# Patient Record
Sex: MALE | Race: WHITE | NOT HISPANIC OR LATINO | Employment: FULL TIME | URBAN - METROPOLITAN AREA
[De-identification: names, ages, dates, MRNs, and addresses within clinical notes are randomized per-mention and may not be internally consistent; named-entity substitution may affect disease eponyms.]

---

## 2017-03-07 DIAGNOSIS — I10 BENIGN ESSENTIAL HYPERTENSION: ICD-10-CM

## 2017-03-07 NOTE — TELEPHONE ENCOUNTER
lisinopril-hydrochlorothiazide (PRINZIDE/ZESTORETIC) 10-12.5 MG per tablet   Last Written Prescription Date: 2-  Last Fill Quantity: 30, # refills: 0  Last Office Visit with [facility], [facility] or [facility] prescribing provider: 10-  Pt. Requested a 90 day supply. Please send RX       Potassium   Date Value Ref Range Status   03/03/2016 4.1 3.4 - 5.3 mmol/L Final     Creatinine   Date Value Ref Range Status   03/03/2016 1.03 0.66 - 1.25 mg/dL Final     BP Readings from Last 3 Encounters:   10/28/16 120/74   03/02/16 120/74   08/03/15 122/84

## 2017-03-08 RX ORDER — LISINOPRIL/HYDROCHLOROTHIAZIDE 10-12.5 MG
1 TABLET ORAL DAILY
Qty: 90 TABLET | Refills: 0 | Status: CANCELLED | OUTPATIENT
Start: 2017-03-08

## 2017-03-08 NOTE — TELEPHONE ENCOUNTER
Need OV with Dr. Eason for HTN f/u and lab.  Last OV for HTN on March 2016  Please call patient and assist with appt.  Thank you     Candelaria Gipson RN

## 2017-03-12 DIAGNOSIS — I10 BENIGN ESSENTIAL HYPERTENSION: ICD-10-CM

## 2017-03-13 RX ORDER — LISINOPRIL/HYDROCHLOROTHIAZIDE 10-12.5 MG
TABLET ORAL
Qty: 90 TABLET | Refills: 0 | OUTPATIENT
Start: 2017-03-13

## 2017-03-13 NOTE — TELEPHONE ENCOUNTER
Prinzide      Last Written Prescription Date: 2/24/17  Last Fill Quantity: 30, # refills: 0  Last Office Visit with G, P or [facility] prescribing provider: 12/3/16  Next 5 appointments (look out 90 days)     Mar 24, 2017  9:20 AM CDT   Office Visit with Chris Eason MD   [facility]    [address] [place] [id number] [phone]                   Potassium   Date Value Ref Range Status   03/03/2016 4.1 3.4 - 5.3 mmol/L Final     Creatinine   Date Value Ref Range Status   03/03/2016 1.03 0.66 - 1.25 mg/dL Final     BP Readings from Last 3 Encounters:   10/28/16 120/74   03/02/16 120/74   08/03/15 122/84     Homa Romero CMA

## 2017-03-24 ENCOUNTER — OFFICE VISIT (OUTPATIENT)
Dept: FAMILY MEDICINE | Facility: CLINIC | Age: 52
End: 2017-03-24
Payer: COMMERCIAL

## 2017-03-24 VITALS
SYSTOLIC BLOOD PRESSURE: 120 MMHG | OXYGEN SATURATION: 100 % | BODY MASS INDEX: 36.57 KG/M2 | WEIGHT: 270 LBS | HEART RATE: 67 BPM | DIASTOLIC BLOOD PRESSURE: 82 MMHG | TEMPERATURE: 97.2 F | HEIGHT: 72 IN

## 2017-03-24 DIAGNOSIS — Z12.5 SCREENING FOR PROSTATE CANCER: ICD-10-CM

## 2017-03-24 DIAGNOSIS — I10 BENIGN ESSENTIAL HYPERTENSION: Primary | ICD-10-CM

## 2017-03-24 LAB
ALBUMIN SERPL-MCNC: 3.8 G/DL (ref 3.4–5)
ALP SERPL-CCNC: 88 U/L (ref 40–150)
ALT SERPL W P-5'-P-CCNC: 34 U/L (ref 0–70)
ANION GAP SERPL CALCULATED.3IONS-SCNC: 7 MMOL/L (ref 3–14)
AST SERPL W P-5'-P-CCNC: 15 U/L (ref 0–45)
BILIRUB SERPL-MCNC: 0.6 MG/DL (ref 0.2–1.3)
BUN SERPL-MCNC: 17 MG/DL (ref 7–30)
CALCIUM SERPL-MCNC: 9.4 MG/DL (ref 8.5–10.1)
CHLORIDE SERPL-SCNC: 104 MMOL/L (ref 94–109)
CHOLEST SERPL-MCNC: 240 MG/DL
CO2 SERPL-SCNC: 28 MMOL/L (ref 20–32)
CREAT SERPL-MCNC: 1.06 MG/DL (ref 0.66–1.25)
GFR SERPL CREATININE-BSD FRML MDRD: 74 ML/MIN/1.7M2
GLUCOSE SERPL-MCNC: 89 MG/DL (ref 70–99)
HDLC SERPL-MCNC: 43 MG/DL
HGB BLD-MCNC: 15 G/DL (ref 13.3–17.7)
LDLC SERPL CALC-MCNC: 171 MG/DL
NONHDLC SERPL-MCNC: 197 MG/DL
POTASSIUM SERPL-SCNC: 4 MMOL/L (ref 3.4–5.3)
PROT SERPL-MCNC: 7.3 G/DL (ref 6.8–8.8)
PSA SERPL-ACNC: 0.27 UG/L (ref 0–4)
SODIUM SERPL-SCNC: 139 MMOL/L (ref 133–144)
TRIGL SERPL-MCNC: 130 MG/DL

## 2017-03-24 PROCEDURE — 36415 COLL VENOUS BLD VENIPUNCTURE: CPT | Performed by: FAMILY MEDICINE

## 2017-03-24 PROCEDURE — 80053 COMPREHEN METABOLIC PANEL: CPT | Performed by: FAMILY MEDICINE

## 2017-03-24 PROCEDURE — 80061 LIPID PANEL: CPT | Performed by: FAMILY MEDICINE

## 2017-03-24 PROCEDURE — 85018 HEMOGLOBIN: CPT | Performed by: FAMILY MEDICINE

## 2017-03-24 PROCEDURE — G0103 PSA SCREENING: HCPCS | Performed by: FAMILY MEDICINE

## 2017-03-24 PROCEDURE — 99213 OFFICE O/P EST LOW 20 MIN: CPT | Performed by: FAMILY MEDICINE

## 2017-03-24 RX ORDER — LISINOPRIL/HYDROCHLOROTHIAZIDE 10-12.5 MG
1 TABLET ORAL DAILY
Qty: 90 TABLET | Refills: 3 | Status: SHIPPED | OUTPATIENT
Start: 2017-03-24 | End: 2018-03-25

## 2017-03-24 NOTE — NURSING NOTE
Chief Complaint   Patient presents with     Hypertension     is fasting       Initial /82  Pulse 67  Temp 97.2  F (36.2  C) (Tympanic)  Ht 6' (1.829 m)  Wt 270 lb (122.5 kg)  SpO2 100%  BMI 36.62 kg/m2 Estimated body mass index is 36.62 kg/(m^2) as calculated from the following:    Height as of this encounter: 6' (1.829 m).    Weight as of this encounter: 270 lb (122.5 kg).  Medication Reconciliation: ivonne Romero CMA

## 2017-03-24 NOTE — PROGRESS NOTES
SUBJECTIVE:                                                    Amilcar Yang is a 51 year old male who presents to clinic today for the following health issues:      Hypertension Follow-up      Outpatient blood pressures are not being checked.    Low Salt Diet: no added salt       Amount of exercise or physical activity: None    Problems taking medications regularly: No    Medication side effects: none    Diet: regular (no restrictions)        Problem list and histories reviewed & adjusted, as indicated.  Additional history: as documented    Patient Active Problem List   Diagnosis     Heartburn     Obesity     Hyperlipidemia LDL goal <160     Benign essential hypertension     Past Surgical History:   Procedure Laterality Date     C NONSPECIFIC PROCEDURE      hernia surgery as a baby     NO HISTORY OF SURGERY         Social History   Substance Use Topics     Smoking status: Former Smoker     Quit date: 7/17/1985     Smokeless tobacco: Never Used     Alcohol use 0.0 oz/week     0 Standard drinks or equivalent per week      Comment: 1-2 drinks per month     Family History   Problem Relation Age of Onset     Hypertension Father      DIABETES Mother      C.A.D. Mother      CEREBROVASCULAR DISEASE Mother      Lipids Mother      Cancer - colorectal Paternal Grandfather      also 8 paternal aunts and uncles with colon CA         Current Outpatient Prescriptions   Medication Sig Dispense Refill     lisinopril-hydrochlorothiazide (PRINZIDE/ZESTORETIC) 10-12.5 MG per tablet Take 1 tablet by mouth daily 90 tablet 3     [DISCONTINUED] lisinopril-hydrochlorothiazide (PRINZIDE/ZESTORETIC) 10-12.5 MG per tablet TAKE 1 TABLET BY MOUTH DAILY 30 tablet 0     No Known Allergies  Recent Labs   Lab Test 03/29/16 03/03/16   0721  08/03/15   0756  07/18/15   0517  07/17/15   0926  12/11/13   0735   A1C   --    --    --    --   5.8  5.6   LDL   --   158*  126  163*   --   159*   HDL   --   43  36*  40*   --   42   TRIG   --   174*   279*  118   --   125   ALT   --   39   --    --   40  41   CR   --   1.03  1.05   --   0.98  1.04   GFRESTIMATED   --   77  75   --   81  77   GFRESTBLACK   --   >90   GFR Calc    >90   GFR Calc     --   >90   GFR Calc    >90   POTASSIUM   --   4.1  3.9   --   3.9  4.6   TSH  1.62   --    --    --    --   1.28      BP Readings from Last 3 Encounters:   03/24/17 120/82   10/28/16 120/74   03/02/16 120/74    Wt Readings from Last 3 Encounters:   03/24/17 270 lb (122.5 kg)   10/28/16 265 lb (120.2 kg)   03/02/16 268 lb (121.6 kg)                    Reviewed and updated as needed this visit by clinical staff  Tobacco  Allergies  Meds  Med Hx  Surg Hx  Fam Hx  Soc Hx      Reviewed and updated as needed this visit by Provider         ROS:  Constitutional, HEENT, cardiovascular, pulmonary, gi and gu systems are negative, except as otherwise noted.    OBJECTIVE:                                                    /82  Pulse 67  Temp 97.2  F (36.2  C) (Tympanic)  Ht 6' (1.829 m)  Wt 270 lb (122.5 kg)  SpO2 100%  BMI 36.62 kg/m2  Body mass index is 36.62 kg/(m^2).  GENERAL: healthy, alert and no distress  NECK: no adenopathy, no asymmetry, masses, or scars and thyroid normal to palpation  RESP: lungs clear to auscultation - no rales, rhonchi or wheezes  CV: regular rate and rhythm, normal S1 S2, no S3 or S4, no murmur, click or rub, no peripheral edema and peripheral pulses strong  ABDOMEN: soft, nontender, no hepatosplenomegaly, no masses and bowel sounds normal  MS: no gross musculoskeletal defects noted, no edema         ASSESSMENT/PLAN:                                                    Amilcar was seen today for hypertension.    Diagnoses and all orders for this visit:    Benign essential hypertension  -     lisinopril-hydrochlorothiazide (PRINZIDE/ZESTORETIC) 10-12.5 MG per tablet; Take 1 tablet by mouth daily  -     Comprehensive metabolic panel (BMP + Alb,  Alk Phos, ALT, AST, Total. Bili, TP)  -     Lipid Profile with reflex to direct LDL  -     Hemoglobin  -     PSA, screen      FUTURE APPOINTMENTS:       - Follow-up visit in 1 year     Chris Eason MD  [facility]

## 2017-03-24 NOTE — MR AVS SNAPSHOT
After Visit Summary   3/24/2017    Amilcar Yang    MRN: [id number]           Patient Information     Date Of Birth          1965        Visit Information        Provider Department      3/24/2017 9:20 AM Chris Eason MD [facility] [place]        Today's Diagnoses     Benign essential hypertension    -  1    Screening for prostate cancer           Follow-ups after your visit        Who to contact     If you have questions or need follow up information about today's clinic visit or your schedule please contact [facility] [place] directly at [phone].  Normal or non-critical lab and imaging results will be communicated to you by Manga Cortahart, letter or phone within 4 business days after the clinic has received the results. If you do not hear from us within 7 days, please contact the clinic through [employer] or phone. If you have a critical or abnormal lab result, we will notify you by phone as soon as possible.  Submit refill requests through [employer] or call your pharmacy and they will forward the refill request to us. Please allow 3 business days for your refill to be completed.          Additional Information About Your Visit        MyChart Information     [employer] gives you secure access to your electronic health record. If you see a primary care provider, you can also send messages to your care team and make appointments. If you have questions, please call your primary care clinic.  If you do not have a primary care provider, please call [phone] and they will assist you.        Care EveryWhere ID     This is your Care EveryWhere ID. This could be used by other organizations to access your [facility] medical records  [id number]        Your Vitals Were     Pulse Temperature Height Pulse Oximetry BMI (Body Mass Index)       67 97.2  F (36.2  C) (Tympanic) 6' (1.829 m) 100% 36.62 kg/m2        Blood Pressure from Last 3 Encounters:   03/24/17 120/82   10/28/16 120/74    03/02/16 120/74    Weight from Last 3 Encounters:   03/24/17 270 lb (122.5 kg)   10/28/16 265 lb (120.2 kg)   03/02/16 268 lb (121.6 kg)              We Performed the Following     Comprehensive metabolic panel (BMP + Alb, Alk Phos, ALT, AST, Total. Bili, TP)     Hemoglobin     Lipid Profile with reflex to direct LDL     PSA, screen          Today's Medication Changes          These changes are accurate as of: 3/24/17  9:34 AM.  If you have any questions, ask your nurse or doctor.               These medicines have changed or have updated prescriptions.        Dose/Directions    lisinopril-hydrochlorothiazide 10-12.5 MG per tablet   Commonly known as:  PRINZIDE/ZESTORETIC   This may have changed:  See the new instructions.   Used for:  Benign essential hypertension   Changed by:  Chris Eason MD        Dose:  1 tablet   Take 1 tablet by mouth daily   Quantity:  90 tablet   Refills:  3            Where to get your medicines      These medications were sent to [facility]/pharmacy #[id number] [place], [place] - [address] [address]     Phone:  [phone]     lisinopril-hydrochlorothiazide 10-12.5 MG per tablet                Primary Care Provider Office Phone #    [facility] [phone]       No address on file        Thank you!     Thank you for choosing [facility] [place]  for your care. Our goal is always to provide you with excellent care. Hearing back from our patients is one way we can continue to improve our services. Please take a few minutes to complete the written survey that you may receive in the mail after your visit with us. Thank you!             Your Updated Medication List - Protect others around you: Learn how to safely use, store and throw away your medicines at www.disposemymeds.org.          This list is accurate as of: 3/24/17  9:34 AM.  Always use your most recent med list.                   Brand Name Dispense Instructions for use     lisinopril-hydrochlorothiazide 10-12.5 MG per tablet    PRINZIDE/ZESTORETIC    90 tablet    Take 1 tablet by mouth daily

## 2017-05-30 ENCOUNTER — OFFICE VISIT (OUTPATIENT)
Dept: FAMILY MEDICINE | Facility: CLINIC | Age: 52
End: 2017-05-30
Payer: COMMERCIAL

## 2017-05-30 VITALS
BODY MASS INDEX: 35.26 KG/M2 | DIASTOLIC BLOOD PRESSURE: 70 MMHG | TEMPERATURE: 98.4 F | SYSTOLIC BLOOD PRESSURE: 124 MMHG | HEART RATE: 88 BPM | WEIGHT: 260 LBS

## 2017-05-30 DIAGNOSIS — K64.4 EXTERNAL HEMORRHOIDS: Primary | ICD-10-CM

## 2017-05-30 PROCEDURE — 99213 OFFICE O/P EST LOW 20 MIN: CPT | Performed by: FAMILY MEDICINE

## 2017-05-30 NOTE — NURSING NOTE
Chief Complaint   Patient presents with     Rectal Problem       Initial /70  Pulse 88  Temp 98.4  F (36.9  C) (Tympanic)  Wt 260 lb (117.9 kg)  BMI 35.26 kg/m2 Estimated body mass index is 35.26 kg/(m^2) as calculated from the following:    Height as of 3/24/17: 6' (1.829 m).    Weight as of this encounter: 260 lb (117.9 kg).  Medication Reconciliation: complete    Current Outpatient Prescriptions   Medication Sig Dispense Refill     lisinopril-hydrochlorothiazide (PRINZIDE/ZESTORETIC) 10-12.5 MG per tablet Take 1 tablet by mouth daily 90 tablet 3       David M, CMA

## 2017-05-30 NOTE — MR AVS SNAPSHOT
After Visit Summary   5/30/2017    Amilcar Yang    MRN: [id number]           Patient Information     Date Of Birth          1965        Visit Information        Provider Department      5/30/2017 10:40 AM [facility]        Today's Diagnoses     External hemorrhoids    -  1       Follow-ups after your visit        Additional Services     COLORECTAL SURGERY REFERRAL       Your provider has referred you to: [facility]: [facility] Liz Zimmerman [phone]  [email or web address]    Referral Reason(s): Hemorrhoids  Special Concerns: None  This referral is: Urgent (24 - 72 hours)  It is OK to leave a message on patient's voicemail.    Please be aware that coverage of these services is subject to the terms and limitations of your health insurance plan.  Call member services at your health plan with any benefit or coverage questions.      Please bring the following with you to your appointment:    (1) Any X-Rays, CTs or MRIs which have been performed.  Contact the facility where they were done to arrange for  prior to your scheduled appointment.    (2) List of current medications  (3) This referral request   (4) Any documents/labs given to you for this referral                  Who to contact     If you have questions or need follow up information about today's clinic visit or your schedule please contact [facility] [place] directly at [phone].  Normal or non-critical lab and imaging results will be communicated to you by MyChart, letter or phone within 4 business days after the clinic has received the results. If you do not hear from us within 7 days, please contact the clinic through MyChart or phone. If you have a critical or abnormal lab result, we will notify you by phone as soon as possible.  Submit refill requests through [employer] or call your pharmacy and they will forward the refill request  to us. Please allow 3 business days for your refill to be completed.          Additional Information About Your Visit        ASYM IIIhart Information     [facility] gives you secure access to your electronic health record. If you see a primary care provider, you can also send messages to your care team and make appointments. If you have questions, please call your primary care clinic.  If you do not have a primary care provider, please call [phone] and they will assist you.        Care EveryWhere ID     This is your Care EveryWhere ID. This could be used by other organizations to access your [facility] medical records  [id number]        Your Vitals Were     Pulse Temperature BMI (Body Mass Index)             88 98.4  F (36.9  C) (Tympanic) 35.26 kg/m2          Blood Pressure from Last 3 Encounters:   05/30/17 124/70   03/24/17 120/82   10/28/16 120/74    Weight from Last 3 Encounters:   05/30/17 260 lb (117.9 kg)   03/24/17 270 lb (122.5 kg)   10/28/16 265 lb (120.2 kg)              We Performed the Following     COLORECTAL SURGERY REFERRAL          Today's Medication Changes          These changes are accurate as of: 5/30/17 10:57 AM.  If you have any questions, ask your nurse or doctor.               Start taking these medicines.        Dose/Directions    hydrocortisone 2.5 % cream   Commonly known as:  ANUSOL-HC   Used for:  External hemorrhoids   Started by:  Vaibhav Seymour MD        Place rectally 2 times daily   Quantity:  30 g   Refills:  0            Where to get your medicines      These medications were sent to [facility]/pharmacy #[id number] [place] [id number] [address] [address]     Phone:  [phone]     hydrocortisone 2.5 % cream                [facility] Care Provider Office Phone [facility] [phone]       No address on file        Thank you!     Thank you for choosing [facility] [place]  for your care. Our goal is always to provide you with  excellent care. Hearing back from our patients is one way we can continue to improve our services. Please take a few minutes to complete the written survey that you may receive in the mail after your visit with us. Thank you!             Your Updated Medication List - Protect others around you: Learn how to safely use, store and throw away your medicines at www.disposemymeds.org.          This list is accurate as of: 5/30/17 10:57 AM.  Always use your most recent med list.                   Brand Name Dispense Instructions for use    hydrocortisone 2.5 % cream    ANUSOL-HC    30 g    Place rectally 2 times daily       lisinopril-hydrochlorothiazide 10-12.5 MG per tablet    PRINZIDE/ZESTORETIC    90 tablet    Take 1 tablet by mouth daily

## 2017-05-30 NOTE — PROGRESS NOTES
SUBJECTIVE:                                                    Amilcar Yang is a 51 year old male who presents to clinic today for the following health issues:      Hemorrhoids     Onset: 3 days ago    Noticed after increase constipation, which has revolved now he feels on and off pain    Description:   Pain: YES  Itching: no     Accompanying Signs & Symptoms:  Blood streaked toilet paper: no   Blood in stool: no   Changes in stool pattern: no    History:   Any previous GI studies done:Colonoscopy 2016  Family History of colon cancer: no        Therapies Tried and outcome: increased fluids          Problem list and histories reviewed & adjusted, as indicated.  Additional history: as documented    Patient Active Problem List   Diagnosis     Heartburn     Obesity     Hyperlipidemia LDL goal <160     Benign essential hypertension     Past Surgical History:   Procedure Laterality Date     C NONSPECIFIC PROCEDURE      hernia surgery as a baby     NO HISTORY OF SURGERY         Social History   Substance Use Topics     Smoking status: Former Smoker     Quit date: 7/17/1985     Smokeless tobacco: Never Used     Alcohol use 0.0 oz/week     0 Standard drinks or equivalent per week      Comment: 1-2 drinks per month     Family History   Problem Relation Age of Onset     Hypertension Father      DIABETES Mother      C.A.D. Mother      CEREBROVASCULAR DISEASE Mother      Lipids Mother      Cancer - colorectal Paternal Grandfather      also 8 paternal aunts and uncles with colon CA         Current Outpatient Prescriptions   Medication Sig Dispense Refill     hydrocortisone (ANUSOL-HC) 2.5 % cream Place rectally 2 times daily 30 g 0     lisinopril-hydrochlorothiazide (PRINZIDE/ZESTORETIC) 10-12.5 MG per tablet Take 1 tablet by mouth daily 90 tablet 3       Reviewed and updated as needed this visit by clinical staff  Tobacco  Allergies       Reviewed and updated as needed this visit by Provider         ROS:  C: NEGATIVE for  fever, chills, change in weight  GI: rectal pain    OBJECTIVE:                                                    /70  Pulse 88  Temp 98.4  F (36.9  C) (Tympanic)  Wt 260 lb (117.9 kg)  BMI 35.26 kg/m2  Body mass index is 35.26 kg/(m^2).   GENERAL: healthy, alert, well nourished, well hydrated, no distress  Large external hemmoroid not thrombosed at 9 clock position almost 1 x1 cm in diameter.       ASSESSMENT/PLAN:                                                        ICD-10-CM    1. External hemorrhoids K64.4 hydrocortisone (ANUSOL-HC) 2.5 % cream     COLORECTAL SURGERY REFERRAL       Patient has large external hemmoroid, not thromboid, given the size I suggested follow up with colon specialist for evaluation. Meanwhile use the cream and also increase fiber.  Follow up if it is getting worse.    Vaibhav Seymour MD  [facility] [place]

## 2017-05-31 ENCOUNTER — TRANSFERRED RECORDS (OUTPATIENT)
Dept: HEALTH INFORMATION MANAGEMENT | Facility: CLINIC | Age: 52
End: 2017-05-31

## 2017-09-10 NOTE — TELEPHONE ENCOUNTER
Patient has supply for coverage until next scheduled appt on 3/24/17.    Will defer to Dr. Eason to discuss and refill at OV.      Candelaria Gipson RN         Color consistent with ethnicity/race, warm, dry intact, resilient.

## 2018-03-30 ENCOUNTER — OFFICE VISIT (OUTPATIENT)
Dept: FAMILY MEDICINE | Facility: CLINIC | Age: 53
End: 2018-03-30
Payer: COMMERCIAL

## 2018-03-30 VITALS
HEART RATE: 65 BPM | RESPIRATION RATE: 16 BRPM | BODY MASS INDEX: 38.33 KG/M2 | WEIGHT: 273.8 LBS | OXYGEN SATURATION: 100 % | TEMPERATURE: 96.3 F | DIASTOLIC BLOOD PRESSURE: 77 MMHG | SYSTOLIC BLOOD PRESSURE: 119 MMHG | HEIGHT: 71 IN

## 2018-03-30 DIAGNOSIS — M25.561 CHRONIC PAIN OF BOTH KNEES: ICD-10-CM

## 2018-03-30 DIAGNOSIS — G89.29 CHRONIC PAIN OF BOTH KNEES: ICD-10-CM

## 2018-03-30 DIAGNOSIS — M25.562 CHRONIC PAIN OF BOTH KNEES: ICD-10-CM

## 2018-03-30 DIAGNOSIS — E78.5 HYPERLIPIDEMIA LDL GOAL <160: Primary | ICD-10-CM

## 2018-03-30 DIAGNOSIS — I10 BENIGN ESSENTIAL HYPERTENSION: ICD-10-CM

## 2018-03-30 LAB
ANION GAP SERPL CALCULATED.3IONS-SCNC: 7 MMOL/L (ref 3–14)
BUN SERPL-MCNC: 13 MG/DL (ref 7–30)
CALCIUM SERPL-MCNC: 9.4 MG/DL (ref 8.5–10.1)
CHLORIDE SERPL-SCNC: 104 MMOL/L (ref 94–109)
CHOLEST SERPL-MCNC: 222 MG/DL
CO2 SERPL-SCNC: 27 MMOL/L (ref 20–32)
CREAT SERPL-MCNC: 1.11 MG/DL (ref 0.66–1.25)
GFR SERPL CREATININE-BSD FRML MDRD: 70 ML/MIN/1.7M2
GLUCOSE SERPL-MCNC: 88 MG/DL (ref 70–99)
HDLC SERPL-MCNC: 38 MG/DL
LDLC SERPL CALC-MCNC: 166 MG/DL
NONHDLC SERPL-MCNC: 184 MG/DL
POTASSIUM SERPL-SCNC: 3.7 MMOL/L (ref 3.4–5.3)
SODIUM SERPL-SCNC: 138 MMOL/L (ref 133–144)
TRIGL SERPL-MCNC: 92 MG/DL

## 2018-03-30 PROCEDURE — 80061 LIPID PANEL: CPT | Performed by: PHYSICIAN ASSISTANT

## 2018-03-30 PROCEDURE — 99213 OFFICE O/P EST LOW 20 MIN: CPT | Performed by: PHYSICIAN ASSISTANT

## 2018-03-30 PROCEDURE — 36415 COLL VENOUS BLD VENIPUNCTURE: CPT | Performed by: PHYSICIAN ASSISTANT

## 2018-03-30 PROCEDURE — 80048 BASIC METABOLIC PNL TOTAL CA: CPT | Performed by: PHYSICIAN ASSISTANT

## 2018-03-30 RX ORDER — LISINOPRIL/HYDROCHLOROTHIAZIDE 10-12.5 MG
1 TABLET ORAL DAILY
Qty: 90 TABLET | Refills: 3 | Status: SHIPPED | OUTPATIENT
Start: 2018-03-30 | End: 2019-03-27

## 2018-03-30 NOTE — PATIENT INSTRUCTIONS
Meniscal (Cartilage) Tear         What is a meniscal (cartilage) tear?   The meniscus is a piece of cartilage in the middle of your knee. Cartilage is tough, smooth, rubbery tissue that lines and cushions the surface of the joints. You have a meniscus on the inner side of your knee (the medial meniscus) and a meniscus on the outer side of the knee (the lateral meniscus). Each meniscus attaches to the top of the shinbone (tibia), makes contact with the thighbone (femur), and acts as a shock absorber during weight-bearing activities. If a meniscus tears, it can cause knee pain and can limit motion.   How does it occur?   A meniscal tear can occur when the knee is forcefully twisted or sometimes with minimal or no trauma, such as when you are squatting.   What are the symptoms?   Symptoms may include the following:   You have pain in your knee joint.   You have immediate swelling with fluid in the joint, called an effusion.   You can't fully bend or straighten your leg.   Your knee locks or gets stuck in one place.   You hear a snap or pop at the time of the injury.   A chronic (old) meniscal tear may give you pain on and off during activities, with or without swelling. Your knee may sometimes lock, and you may have stiffness in the knee.   How is it diagnosed?   Your healthcare provider will review your symptoms and how the injury occurred. He or she will ask about your medical history and examine your knee. Your provider will move your knee in several ways that may cause pain along the injured meniscal surface. You may have X-rays to see if the bones in your knee are injured, but a meniscal tear will not show on an X-ray. An MRI scan can help diagnose a meniscal tear.   How is it treated?   To treat this condition:   Put an ice pack, gel pack, or package of frozen vegetables, wrapped in a cloth on the area every 3 to 4 hours, for up to 20 minutes at a time.   Raise the knee on a pillow when you sit or  lie down.   Wrap an elastic bandage around your knee to keep the swelling from getting worse.   Wear a knee immobilizer or other brace as directed by your provider.   Use crutches to prevent further injury while the meniscus heals.   Take an anti-inflammatory medicine such as ibuprofen, or other medicine as directed by your provider. Nonsteroidal anti-inflammatory medicines (NSAIDs) may cause stomach bleeding and other problems. These risks increase with age. Read the label and take as directed. Unless recommended by your healthcare provider, do not take for more than 10 days.   While you are recovering from your injury, you will need to change your sport or activity to one that does not make your condition worse. For example, you may need to swim instead of run.   Arthroscopic surgery is needed to repair or remove large torn pieces of cartilage. The surgery usually takes about an hour. An arthroscope is a tube with a light on the end that projects an image of the inside of your knee onto a TV screen. By putting tools through the end of the arthroscope, the healthcare provider can usually repair or remove the damaged meniscus. Because the meniscus is a valuable shock absorber, the provider will leave as much of the healthy portion of the meniscus as possible during surgery.   You will go home the day of the surgery. You should keep your leg elevated. Take it easy for at least the next 2 to 3 days.   Do not take part in strenuous activities until your healthcare provider advises that you are ready.   How long will the effects last?   If you have a small tear that has not been repaired or removed, you may still be able to function well and be active. However, your knee may sometimes swell, lock, be stiff, or hurt during activities.   If you have surgery, you will need to spend time rehabilitating your knee. Everyone recovers at a different rate, depending on the severity of the injury and their general health. Many  people return to their previous level of activity within a month or so after surgery.   When can I return to my normal activities?   Everyone recovers from an injury at a different rate. Return to your activities depends on how soon your knee recovers, not by how many days or weeks it has been since your injury has occurred. The goal is to return you to your normal activities as soon as is safely possible. If you return too soon you may worsen your injury.   You may safely return to your normal activities when, starting from the top of the list and progressing to the end, each of the following is true:   your injured knee can be fully straightened and bent without pain   your knee and leg have regained normal strength compared to the uninjured knee and leg   your knee is not swollen   you are able to bend, squat, or walk without pain   How can a meniscal tear be prevented?   To help prevent knee cartilage injuries, it helps to have strong thigh and hamstring muscles, and to stretch your legs before and after exercising. In activities such as skiing, be sure your ski bindings are set correctly by a trained professional so that your skis will release when you fall.               Meniscal (Cartilage) Tear Rehabilitation Exercises                You may do the first 5 exercises right away. You may do the rest of the exercises when the pain in your knee has decreased.   Passive knee extension: Do this exercise if you are unable to fully extend your knee. While lying on your back, place a rolled-up towel underneath the heel of your injured leg so the heel is about 6 inches off the ground. Relax your leg muscles and let gravity slowly straighten your knee. You may feel some discomfort while doing this exercise. Try to hold this position for 2 minutes. Repeat 3 times. Do this exercise several times per day. This exercise can also be done while sitting in a chair with your heel on another chair or stool.   Heel slide: Sit on  a firm surface with your legs straight in front of you. Slowly slide the heel of your injured leg toward your buttock by pulling your knee toward your chest as you slide the heel. Return to the starting position. Do 3 sets of 10.   Standing calf stretch: Stand facing a wall with your hands on the wall at about eye level. Keep your injured leg back with your heel on the floor. Keep the other leg forward with the knee bent. Turn your back foot slightly inward (as if you were pigeon-toed). Slowly lean into the wall until you feel a stretch in the back of your calf. Hold the stretch for 15 to 30 seconds. Return to the starting position. Repeat 3 times. Do this exercise several times each day.   Hamstring stretch on wall: Lie on your back with your buttocks close to a doorway. Stretch your uninjured leg straight out in front of you on the floor through the doorway. Raise your injured leg and rest it against the wall next to the door frame. Keep your leg as straight as possible. You should feel a stretch in the back of your thigh. Hold this position for 15 to 30 seconds. Repeat 3 times.   Straight leg raise: Lie on your back with your legs straight out in front of you. Bend the knee on your uninjured side and place the foot flat on the floor. Tighten the thigh muscle on your injured side and lift your leg about 8 inches off the floor. Keep your leg straight and your thigh muscle tight. Slowly lower your leg back down to the floor. Do 3 sets of 10.   Wall squat with a ball: Stand with your back, shoulders, and head against a wall. Look straight ahead. Keep your shoulders relaxed and your feet 3 feet from the wall and shoulder's width apart. Place a soccer or basketball-sized ball behind your back. Keeping your back against the wall, slowly squat down to a 45-degree angle. Your thighs will not yet be parallel to the floor. Hold this position for 10 seconds and then slowly slide back up the wall. Repeat 10 times. Build up to  3 sets of 10.   Step-up: Stand with the foot of your injured leg on a support 3 to 5 inches high (like a small step or block of wood). Keep your other foot flat on the floor. Shift your weight onto the injured leg on the support. Straighten your injured leg as the other leg comes off the floor. Return to the starting position by bending your injured leg and slowly lowering your uninjured leg back to the floor. Do 3 sets of 10.   Knee stabilization: Wrap a piece of elastic tubing around the ankle of the uninjured leg. Tie a knot in the other end of the tubing and close it in a door.   0. Stand facing the door on the leg without tubing and bend your knee slightly, keeping your thigh muscles tight. While maintaining this position, move the leg with the tubing straight back behind you. Do 3 sets of 10.   0. Turn 90 degrees so the leg without tubing is closest to the door. Move the leg with tubing away from your body. Do 3 sets of 10.   0. Turn 90 degrees again so your back is to the door. Move the leg with tubing straight out in front of you. Do 3 sets of 10.   0. Turn your body 90 degrees again so the leg with tubing is closest to the door. Move the leg with tubing across your body. Do 3 sets of 10.   Hold onto a chair if you need help balancing. This exercise can be made even more challenging by standing on a pillow while you move the leg with tubing.   Resisted terminal knee extension: Make a loop from a piece of elastic tubing by tying a knot in both ends. Close both knots in a door. Step into the loop so the tubing is around the back of your injured leg. Lift the other foot off the ground. Hold onto a chair for balance, if needed. Bend the knee on the leg with tubing about 45 degrees. Slowly straighten your leg, keeping your thigh muscle tight as you do this. Do this 10 times. Do 3 sets. An easier way to do this is to stand on both legs for better support while you do the exercise.   Wobble board exercises:    0. Stand on a wobble board with your feet shoulder width apart. Rock the board forwards and backwards 30 times, then side to side 30 times. Hold on to a chair if you need support.   0. Rotate the wobble board around so that the edge of the board is in contact with the floor at all times. Do this 30 times in a clockwise and then a counterclockwise direction.   0. Balance on the wobble board for as long as you can without letting the edges touch the floor. Try to do this for 2 minutes without touching the floor.   0. Rotate the wobble board in clockwise and counterclockwise circles, but do not allow the edge of the board to touch the floor.   0. When you have mastered exercises A through D, try repeating them while standing on just your injured leg. Once you can do these exercises on one leg, try to do them with your eyes closed. Make sure you have something nearby to support you in case you lose your balance.         Published by [employer].  This content is reviewed periodically and is subject to change as new health information becomes available. The information is intended to inform and educate and is not a replacement for medical evaluation, advice, diagnosis or treatment by a healthcare professional.   Written by Susan Willams, MS, PT, and Linsey Henry PT, [facility], [facility], for [employer].   ? 2010 [employer] and/or its affiliates. All Rights Reserved.   Copyright   [employer] 2011

## 2018-03-30 NOTE — NURSING NOTE
"Chief Complaint   Patient presents with     Recheck Medication     Lisinopril       Initial /77  Pulse 65  Temp 96.3  F (35.7  C) (Tympanic)  Resp 16  Ht 5' 11.25\" (1.81 m)  Wt 273 lb 12.8 oz (124.2 kg)  SpO2 100%  BMI 37.92 kg/m2 Estimated body mass index is 37.92 kg/(m^2) as calculated from the following:    Height as of this encounter: 5' 11.25\" (1.81 m).    Weight as of this encounter: 273 lb 12.8 oz (124.2 kg).  Medication Reconciliation: complete    Shirley Mcknight MA  "

## 2018-03-30 NOTE — MR AVS SNAPSHOT
After Visit Summary   3/30/2018    Amilcar Yang    MRN: [id number]           Patient Information     Date Of Birth          1965        Visit Information        Provider Department      3/30/2018 7:20 AM Sanjiv Clayton PA-C [facility] [place]        Today's Diagnoses     Hyperlipidemia LDL goal <160    -  1    Benign essential hypertension        Chronic pain of both knees          Care Instructions                    Meniscal (Cartilage) Tear         What is a meniscal (cartilage) tear?   The meniscus is a piece of cartilage in the middle of your knee. Cartilage is tough, smooth, rubbery tissue that lines and cushions the surface of the joints. You have a meniscus on the inner side of your knee (the medial meniscus) and a meniscus on the outer side of the knee (the lateral meniscus). Each meniscus attaches to the top of the shinbone (tibia), makes contact with the thighbone (femur), and acts as a shock absorber during weight-bearing activities. If a meniscus tears, it can cause knee pain and can limit motion.   How does it occur?   A meniscal tear can occur when the knee is forcefully twisted or sometimes with minimal or no trauma, such as when you are squatting.   What are the symptoms?   Symptoms may include the following:   You have pain in your knee joint.   You have immediate swelling with fluid in the joint, called an effusion.   You can't fully bend or straighten your leg.   Your knee locks or gets stuck in one place.   You hear a snap or pop at the time of the injury.   A chronic (old) meniscal tear may give you pain on and off during activities, with or without swelling. Your knee may sometimes lock, and you may have stiffness in the knee.   How is it diagnosed?   Your healthcare provider will review your symptoms and how the injury occurred. He or she will ask about your medical history and examine your knee. Your provider will move your knee in several ways that  may cause pain along the injured meniscal surface. You may have X-rays to see if the bones in your knee are injured, but a meniscal tear will not show on an X-ray. An MRI scan can help diagnose a meniscal tear.   How is it treated?   To treat this condition:   Put an ice pack, gel pack, or package of frozen vegetables, wrapped in a cloth on the area every 3 to 4 hours, for up to 20 minutes at a time.   Raise the knee on a pillow when you sit or lie down.   Wrap an elastic bandage around your knee to keep the swelling from getting worse.   Wear a knee immobilizer or other brace as directed by your provider.   Use crutches to prevent further injury while the meniscus heals.   Take an anti-inflammatory medicine such as ibuprofen, or other medicine as directed by your provider. Nonsteroidal anti-inflammatory medicines (NSAIDs) may cause stomach bleeding and other problems. These risks increase with age. Read the label and take as directed. Unless recommended by your healthcare provider, do not take for more than 10 days.   While you are recovering from your injury, you will need to change your sport or activity to one that does not make your condition worse. For example, you may need to swim instead of run.   Arthroscopic surgery is needed to repair or remove large torn pieces of cartilage. The surgery usually takes about an hour. An arthroscope is a tube with a light on the end that projects an image of the inside of your knee onto a TV screen. By putting tools through the end of the arthroscope, the healthcare provider can usually repair or remove the damaged meniscus. Because the meniscus is a valuable shock absorber, the provider will leave as much of the healthy portion of the meniscus as possible during surgery.   You will go home the day of the surgery. You should keep your leg elevated. Take it easy for at least the next 2 to 3 days.   Do not take part in strenuous activities until your healthcare provider  advises that you are ready.   How long will the effects last?   If you have a small tear that has not been repaired or removed, you may still be able to function well and be active. However, your knee may sometimes swell, lock, be stiff, or hurt during activities.   If you have surgery, you will need to spend time rehabilitating your knee. Everyone recovers at a different rate, depending on the severity of the injury and their general health. Many people return to their previous level of activity within a month or so after surgery.   When can I return to my normal activities?   Everyone recovers from an injury at a different rate. Return to your activities depends on how soon your knee recovers, not by how many days or weeks it has been since your injury has occurred. The goal is to return you to your normal activities as soon as is safely possible. If you return too soon you may worsen your injury.   You may safely return to your normal activities when, starting from the top of the list and progressing to the end, each of the following is true:   your injured knee can be fully straightened and bent without pain   your knee and leg have regained normal strength compared to the uninjured knee and leg   your knee is not swollen   you are able to bend, squat, or walk without pain   How can a meniscal tear be prevented?   To help prevent knee cartilage injuries, it helps to have strong thigh and hamstring muscles, and to stretch your legs before and after exercising. In activities such as skiing, be sure your ski bindings are set correctly by a trained professional so that your skis will release when you fall.               Meniscal (Cartilage) Tear Rehabilitation Exercises                You may do the first 5 exercises right away. You may do the rest of the exercises when the pain in your knee has decreased.   Passive knee extension: Do this exercise if you are unable to fully extend your knee. While lying on your  back, place a rolled-up towel underneath the heel of your injured leg so the heel is about 6 inches off the ground. Relax your leg muscles and let gravity slowly straighten your knee. You may feel some discomfort while doing this exercise. Try to hold this position for 2 minutes. Repeat 3 times. Do this exercise several times per day. This exercise can also be done while sitting in a chair with your heel on another chair or stool.   Heel slide: Sit on a firm surface with your legs straight in front of you. Slowly slide the heel of your injured leg toward your buttock by pulling your knee toward your chest as you slide the heel. Return to the starting position. Do 3 sets of 10.   Standing calf stretch: Stand facing a wall with your hands on the wall at about eye level. Keep your injured leg back with your heel on the floor. Keep the other leg forward with the knee bent. Turn your back foot slightly inward (as if you were pigeon-toed). Slowly lean into the wall until you feel a stretch in the back of your calf. Hold the stretch for 15 to 30 seconds. Return to the starting position. Repeat 3 times. Do this exercise several times each day.   Hamstring stretch on wall: Lie on your back with your buttocks close to a doorway. Stretch your uninjured leg straight out in front of you on the floor through the doorway. Raise your injured leg and rest it against the wall next to the door frame. Keep your leg as straight as possible. You should feel a stretch in the back of your thigh. Hold this position for 15 to 30 seconds. Repeat 3 times.   Straight leg raise: Lie on your back with your legs straight out in front of you. Bend the knee on your uninjured side and place the foot flat on the floor. Tighten the thigh muscle on your injured side and lift your leg about 8 inches off the floor. Keep your leg straight and your thigh muscle tight. Slowly lower your leg back down to the floor. Do 3 sets of 10.   Wall squat with a ball:  Stand with your back, shoulders, and head against a wall. Look straight ahead. Keep your shoulders relaxed and your feet 3 feet from the wall and shoulder's width apart. Place a soccer or basketball-sized ball behind your back. Keeping your back against the wall, slowly squat down to a 45-degree angle. Your thighs will not yet be parallel to the floor. Hold this position for 10 seconds and then slowly slide back up the wall. Repeat 10 times. Build up to 3 sets of 10.   Step-up: Stand with the foot of your injured leg on a support 3 to 5 inches high (like a small step or block of wood). Keep your other foot flat on the floor. Shift your weight onto the injured leg on the support. Straighten your injured leg as the other leg comes off the floor. Return to the starting position by bending your injured leg and slowly lowering your uninjured leg back to the floor. Do 3 sets of 10.   Knee stabilization: Wrap a piece of elastic tubing around the ankle of the uninjured leg. Tie a knot in the other end of the tubing and close it in a door.   0. Stand facing the door on the leg without tubing and bend your knee slightly, keeping your thigh muscles tight. While maintaining this position, move the leg with the tubing straight back behind you. Do 3 sets of 10.   0. Turn 90 degrees so the leg without tubing is closest to the door. Move the leg with tubing away from your body. Do 3 sets of 10.   0. Turn 90 degrees again so your back is to the door. Move the leg with tubing straight out in front of you. Do 3 sets of 10.   0. Turn your body 90 degrees again so the leg with tubing is closest to the door. Move the leg with tubing across your body. Do 3 sets of 10.   Hold onto a chair if you need help balancing. This exercise can be made even more challenging by standing on a pillow while you move the leg with tubing.   Resisted terminal knee extension: Make a loop from a piece of elastic tubing by tying a knot in both ends. Close both  knots in a door. Step into the loop so the tubing is around the back of your injured leg. Lift the other foot off the ground. Hold onto a chair for balance, if needed. Bend the knee on the leg with tubing about 45 degrees. Slowly straighten your leg, keeping your thigh muscle tight as you do this. Do this 10 times. Do 3 sets. An easier way to do this is to stand on both legs for better support while you do the exercise.   Wobble board exercises:   0. Stand on a wobble board with your feet shoulder width apart. Rock the board forwards and backwards 30 times, then side to side 30 times. Hold on to a chair if you need support.   0. Rotate the wobble board around so that the edge of the board is in contact with the floor at all times. Do this 30 times in a clockwise and then a counterclockwise direction.   0. Balance on the wobble board for as long as you can without letting the edges touch the floor. Try to do this for 2 minutes without touching the floor.   0. Rotate the wobble board in clockwise and counterclockwise circles, but do not allow the edge of the board to touch the floor.   0. When you have mastered exercises A through D, try repeating them while standing on just your injured leg. Once you can do these exercises on one leg, try to do them with your eyes closed. Make sure you have something nearby to support you in case you lose your balance.         Published by [employer].  This content is reviewed periodically and is subject to change as new health information becomes available. The information is intended to inform and educate and is not a replacement for medical evaluation, advice, diagnosis or treatment by a healthcare professional.   Written by Susan Willams, MS, PT, and Linsey Henry, PT, [facility], [facility], for [employer].   ? 2010 [facility] and/or its affiliates. All Rights Reserved.   Copyright   [employer] 2011              Follow-ups after your visit        Follow-up notes from your  "care team     Return in about 1 year (around 3/30/2019) for Physical Exam.      Who to contact     If you have questions or need follow up information about today's clinic visit or your schedule please contact [facility] [place] directly at [phone].  Normal or non-critical lab and imaging results will be communicated to you by MyChart, letter or phone within 4 business days after the clinic has received the results. If you do not hear from us within 7 days, please contact the clinic through Ep[employer]am[employer] or phone. If you have a critical or abnormal lab result, we will notify you by phone as soon as possible.  Submit refill requests through [employer] or call your pharmacy and they will forward the refill request to us. Please allow 3 business days for your refill to be completed.          Additional Information About Your Visit        MyChart Information     [employer] gives you secure access to your electronic health record. If you see a primary care provider, you can also send messages to your care team and make appointments. If you have questions, please call your primary care clinic.  If you do not have a primary care provider, please call [phone] and they will assist you.        Care EveryWhere ID     This is your Care EveryWhere ID. This could be used by other organizations to access your [facility] medical records  [id number]        Your Vitals Were     Pulse Temperature Respirations Height Pulse Oximetry BMI (Body Mass Index)    65 96.3  F (35.7  C) (Tympanic) 16 5' 11.25\" (1.81 m) 100% 37.92 kg/m2       Blood Pressure from Last 3 Encounters:   03/30/18 119/77   05/30/17 124/70   03/24/17 120/82    Weight from Last 3 Encounters:   03/30/18 273 lb 12.8 oz (124.2 kg)   05/30/17 260 lb (117.9 kg)   03/24/17 270 lb (122.5 kg)              We Performed the Following     Basic metabolic panel     Lipid Profile (Chol, Trig, HDL, LDL calc)          Today's Medication Changes          These changes are " accurate as of 3/30/18  7:58 AM.  If you have any questions, ask your nurse or doctor.               These medicines have changed or have updated prescriptions.        Dose/Directions    lisinopril-hydrochlorothiazide 10-12.5 MG per tablet   Commonly known as:  PRINZIDE/ZESTORETIC   This may have changed:  See the new instructions.   Used for:  Benign essential hypertension   Changed by:  Sanjiv Clayton PA-C        Dose:  1 tablet   Take 1 tablet by mouth daily   Quantity:  90 tablet   Refills:  3            Where to get your medicines      These medications were sent to [facility]/pharmacy #[id number] - [facility] [address] [facility]  [address] [place] [id number]     Phone:  [phone]     lisinopril-hydrochlorothiazide 10-12.5 MG per tablet                Primary Care Provider Office Phone # Fax #    Vaibhav Seymour [phone]       [address]  [place] [address]        Equal Access to Services     [facility]: Hadii aad ku hadasho Soomaali, waaxda luqadaha, qaybta kaalmada adeegyada, waxay idiin hayaan raymond luis . So [facility] [phone].    ATENCIÓN: Si habla español, tiene a cortes disposición servicios gratuitos de asistencia lingüística. [facility] [phone].    We comply with applicable federal civil rights laws and [place] laws. We do not discriminate on the basis of race, color, national origin, age, disability, sex, sexual orientation, or gender identity.            Thank you!     Thank you for choosing [facility] [place]  for your care. Our goal is always to provide you with excellent care. Hearing back from our patients is one way we can continue to improve our services. Please take a few minutes to complete the written survey that you may receive in the mail after your visit with us. Thank you!             Your Updated Medication List - Protect others around you: Learn how to safely use, store and throw away your medicines at  www.disposemymeds.org.          This list is accurate as of 3/30/18  7:58 AM.  Always use your most recent med list.                   Brand Name Dispense Instructions for use Diagnosis    hydrocortisone 2.5 % cream    ANUSOL-HC    30 g    Place rectally 2 times daily    External hemorrhoids       lisinopril-hydrochlorothiazide 10-12.5 MG per tablet    PRINZIDE/ZESTORETIC    90 tablet    Take 1 tablet by mouth daily    Benign essential hypertension

## 2018-03-30 NOTE — PROGRESS NOTES
SUBJECTIVE:   Amilcar Yang is a 52 year old male who presents to clinic today for the following health issues:    Medication Followup of Lisinopril    Taking Medication as prescribed: yes    Side Effects:  None    Medication Helping Symptoms:  yes       Hypertension Follow-up      Outpatient blood pressures checking once a month    Low Salt Diet: not monitoring salt, regular diet no restrictions    Taking Lisinopril-HCTZ daily without SE, BPs have been well controlled at clinic and at home, due for labs today      Knee pain: Bilateral knee pain located medially that only occurs when he crosses his legs.  Pain is short lives, no pain with weight bearing, no injury noted.      Problem list and histories reviewed & adjusted, as indicated.  Additional history: as documented    Patient Active Problem List   Diagnosis     Heartburn     Obesity     Hyperlipidemia LDL goal <160     Benign essential hypertension     Past Surgical History:   Procedure Laterality Date     C NONSPECIFIC PROCEDURE      hernia surgery as a baby     NO HISTORY OF SURGERY         Social History   Substance Use Topics     Smoking status: Former Smoker     Quit date: 7/17/1985     Smokeless tobacco: Never Used     Alcohol use 0.0 oz/week     0 Standard drinks or equivalent per week      Comment: 1-2 drinks per month     Family History   Problem Relation Age of Onset     Hypertension Father      DIABETES Mother      C.A.D. Mother      CEREBROVASCULAR DISEASE Mother      Lipids Mother      Cancer - colorectal Paternal Grandfather      also 8 paternal aunts and uncles with colon CA         Current Outpatient Prescriptions   Medication Sig Dispense Refill     lisinopril-hydrochlorothiazide (PRINZIDE/ZESTORETIC) 10-12.5 MG per tablet Take 1 tablet by mouth daily 90 tablet 3     [DISCONTINUED] lisinopril-hydrochlorothiazide (PRINZIDE/ZESTORETIC) 10-12.5 MG per tablet TAKE 1 TABLET BY MOUTH DAILY 30 tablet 0     hydrocortisone (ANUSOL-HC) 2.5 % cream  "Place rectally 2 times daily (Patient not taking: Reported on 3/30/2018) 30 g 0     No Known Allergies    Reviewed and updated as needed this visit by clinical staff       Reviewed and updated as needed this visit by Provider         ROS:  Constitutional, HEENT, cardiovascular, pulmonary, gi and gu systems are negative, except as otherwise noted.    OBJECTIVE:     /77  Pulse 65  Temp 96.3  F (35.7  C) (Tympanic)  Resp 16  Ht 5' 11.25\" (1.81 m)  Wt 273 lb 12.8 oz (124.2 kg)  SpO2 100%  BMI 37.92 kg/m2  Body mass index is 37.92 kg/(m^2).  GENERAL: healthy, alert and no distress  RESP: lungs clear to auscultation - no rales, rhonchi or wheezes  CV: regular rate and rhythm, normal S1 S2, no S3 or S4, no murmur, click or rub, no peripheral edema    Diagnostic Test Results:  none     ASSESSMENT/PLAN:       1. Benign essential hypertension  Controlled with medication, refills given  - lisinopril-hydrochlorothiazide (PRINZIDE/ZESTORETIC) 10-12.5 MG per tablet; Take 1 tablet by mouth daily  Dispense: 90 tablet; Refill: 3  - Basic metabolic panel    2. Hyperlipidemia LDL goal <160  - Lipid Profile (Chol, Trig, HDL, LDL calc)    3. Chronic pain of both knees  Recommend gentle stretching, exercise and NSAIDs for pain.  Advise that he follow up if new or worsening symptoms    See Patient Instructions    Sanjiv Clayton PA-C  [facility] [place]  "

## 2018-04-02 DIAGNOSIS — E78.5 HYPERLIPIDEMIA, UNSPECIFIED HYPERLIPIDEMIA TYPE: Primary | ICD-10-CM

## 2018-04-02 RX ORDER — ATORVASTATIN CALCIUM 10 MG/1
10 TABLET, FILM COATED ORAL DAILY
Qty: 90 TABLET | Refills: 1 | Status: SHIPPED | OUTPATIENT
Start: 2018-04-02 | End: 2018-09-21

## 2018-04-02 NOTE — PROGRESS NOTES
Amilcar-  Here are your recent results.       -Kidney function is normal (Cr, GFR), Sodium is normal, Potassium is normal, Calcium is normal, Glucose is normal (diabetes screening test).     -LDL(bad) cholesterol level is elevated, HDL(good) cholesterol level is low  A diet high in fat and simple carbohydrates, genetics and being overweight can contribute to this. ADVISE: Exercise, a low fat, low carbohydrate diet, weight control, and omega-3 fatty acids (fish oil) 1953-8353 mg daily are helpful to improve this.     Given these numbers, I would like to start you on a a medication to help to lower your cholesterol.  I am sending a prescription of Lipitor 10 mg to the pharmacy.  You may take this once daily.  Side effects are possible nausea, dizziness and muscle aches.  If you should develop muscle aches, please return to the clinic for evaluation.  Carlton let me know if you have questions.    If you have any questions please do not hesitate to contact our office via phone ([phone]) or you may send me a message via [employer] by clicking the contact my Care Team link.      It was a pleasure participating in your care!    Thank you,    Sanjiv Clayton MPH, PA-C  [address] [place] [address] [phone]

## 2018-09-21 DIAGNOSIS — E78.5 HYPERLIPIDEMIA, UNSPECIFIED HYPERLIPIDEMIA TYPE: ICD-10-CM

## 2018-09-21 RX ORDER — ATORVASTATIN CALCIUM 10 MG/1
TABLET, FILM COATED ORAL
Qty: 90 TABLET | Refills: 1 | Status: SHIPPED | OUTPATIENT
Start: 2018-09-21 | End: 2019-03-17

## 2018-09-21 NOTE — TELEPHONE ENCOUNTER
"Requested Prescriptions   Pending Prescriptions Disp Refills     atorvastatin (LIPITOR) 10 MG tablet [Pharmacy Med Name: ATORVASTATIN 10 MG TABLET] 90 tablet 1    Last Written Prescription Date:  4/2/18  Last Fill Quantity: 90,  # refills: 1   Last office visit: 3/30/2018 with prescribing provider:  YES    Future Office Visit:     Sig: TAKE 1 TABLET (10 MG) BY MOUTH DAILY    Statins Protocol Passed    9/21/2018  1:59 AM       Passed - LDL on file in past 12 months    Recent Labs   Lab Test  03/30/18   0800   LDL  166*            Passed - No abnormal creatine kinase in past 12 months    No lab results found.            Passed - Recent (12 mo) or future (30 days) visit within the authorizing provider's specialty    Patient had office visit in the last 12 months or has a visit in the next 30 days with authorizing provider or within the authorizing provider's specialty.  See \"Patient Info\" tab in inbasket, or \"Choose Columns\" in Meds & Orders section of the refill encounter.           Passed - Patient is age 18 or older          "

## 2018-09-21 NOTE — TELEPHONE ENCOUNTER
Routing refill request to provider for review/approval because:  Labs out of range:  LDL above goal    Casi Chawla,RN BSN  [facility]  [phone]

## 2018-12-14 ENCOUNTER — ANCILLARY PROCEDURE (OUTPATIENT)
Dept: GENERAL RADIOLOGY | Facility: CLINIC | Age: 53
End: 2018-12-14
Attending: FAMILY MEDICINE
Payer: COMMERCIAL

## 2018-12-14 ENCOUNTER — OFFICE VISIT (OUTPATIENT)
Dept: ORTHOPEDICS | Facility: CLINIC | Age: 53
End: 2018-12-14
Payer: COMMERCIAL

## 2018-12-14 VITALS
BODY MASS INDEX: 38.22 KG/M2 | DIASTOLIC BLOOD PRESSURE: 72 MMHG | SYSTOLIC BLOOD PRESSURE: 122 MMHG | WEIGHT: 273 LBS | HEIGHT: 71 IN

## 2018-12-14 DIAGNOSIS — M25.561 CHRONIC PAIN OF RIGHT KNEE: ICD-10-CM

## 2018-12-14 DIAGNOSIS — E66.01 MORBID OBESITY (H): ICD-10-CM

## 2018-12-14 DIAGNOSIS — M25.561 ACUTE PAIN OF RIGHT KNEE: Primary | ICD-10-CM

## 2018-12-14 DIAGNOSIS — G89.29 CHRONIC PAIN OF RIGHT KNEE: ICD-10-CM

## 2018-12-14 PROCEDURE — 73562 X-RAY EXAM OF KNEE 3: CPT | Mod: FY | Performed by: FAMILY MEDICINE

## 2018-12-14 PROCEDURE — 99204 OFFICE O/P NEW MOD 45 MIN: CPT | Performed by: FAMILY MEDICINE

## 2018-12-14 RX ORDER — NEEDLES, DISPOSABLE 25GX5/8"
NEEDLE, DISPOSABLE MISCELLANEOUS SEE ADMIN INSTRUCTIONS
Refills: 3 | COMMUNITY
Start: 2018-08-22 | End: 2019-01-08

## 2018-12-14 RX ORDER — ANASTROZOLE 1 MG/1
TABLET ORAL
Refills: 3 | COMMUNITY
Start: 2018-11-29 | End: 2020-05-06

## 2018-12-14 RX ORDER — TESTOSTERONE CYPIONATE 200 MG/ML
INJECTION, SOLUTION INTRAMUSCULAR
Refills: 0 | COMMUNITY
Start: 2018-08-27 | End: 2020-05-06

## 2018-12-14 RX ORDER — SYRINGE AND NEEDLE,INSULIN,1ML 25GX1"
SYRINGE, EMPTY DISPOSABLE MISCELLANEOUS SEE ADMIN INSTRUCTIONS
Refills: 3 | COMMUNITY
Start: 2018-12-06 | End: 2020-05-06

## 2018-12-14 RX ORDER — NEEDLES, DISPOSABLE 25GX5/8"
NEEDLE, DISPOSABLE MISCELLANEOUS SEE ADMIN INSTRUCTIONS
Refills: 3 | COMMUNITY
Start: 2018-12-06 | End: 2020-05-06

## 2018-12-14 ASSESSMENT — MIFFLIN-ST. JEOR: SCORE: 2114.41

## 2018-12-14 NOTE — LETTER
12/14/2018         RE: Amilcar Yang  [address] [facility] [address]        Dear Colleague,    Thank you for referring your patient, Amilcar Yang, to the [facility]. Please see a copy of my visit note below.    HPI     [facility]   Clinic Visit s Dec 14, 2018    PCP: Vaibhav Seymour      Amilcar is a 53 year old male who is seen as self referral for   Chief Complaint   Patient presents with     Right Knee - Pain       Injury: Patient describes injury as slipped on a ladder and felt a jam in his right knee.          Location of Pain: right knee anterior , medial and lateral, nonradiating   Duration of Pain: 1 month(s)  Rating of Pain at worst: 5/10  Rating of Pain Currently: 2/10  Pain is better with: activity avoidance   Pain is worse with: twisting and pivoting   Treatment so far consists of: home exercises and rest  Associated symptoms: no distal numbness or tingling; denies swelling or warmth  Recent imaging completed: No recent imaging completed.  Prior History of related problems: none    Social History: is employed as a/an warranty analyst      Past Medical History:   Diagnosis Date     Abnormal glucose      Benign essential hypertension 8/3/2015     Heartburn      Obesity 4/30/2009       Patient Active Problem List    Diagnosis Date Noted     Benign essential hypertension 08/03/2015     Priority: Medium     Hyperlipidemia LDL goal <160 12/09/2013     Priority: Medium     Obesity 04/30/2009     Priority: Medium     Heartburn      Priority: Medium       Family History   Problem Relation Age of Onset     Hypertension Father      Diabetes Mother      C.A.D. Mother      Cerebrovascular Disease Mother      Lipids Mother      Cancer - colorectal Paternal Grandfather         also 8 paternal aunts and uncles with colon CA       Social History     Socioeconomic History     Marital status:      Spouse name:      Number of  "children: 2     Years of education: Not on file     Highest education level: Not on file   Social Needs     Financial resource strain: Not on file     Food insecurity - worry: Not on file     Food insecurity - inability: Not on file     Transportation needs - medical: Not on file     Transportation needs - non-medical: Not on file   Occupational History     Occupation: lenny analyst   Tobacco Use     Smoking status: Former Smoker     Last attempt to quit: 1985     Years since quittin.4     Smokeless tobacco: Never Used   Substance and Sexual Activity     Alcohol use: Yes     Alcohol/week: 0.0 oz     Comment: 1-2 drinks per month       Past Surgical History:   Procedure Laterality Date     C NONSPECIFIC PROCEDURE      hernia surgery as a baby     NO HISTORY OF SURGERY             Review of Systems   Musculoskeletal: Positive for joint pain.   All other systems reviewed and are negative.        Physical Exam   Musculoskeletal:        Right knee: He exhibits no effusion.     /72   Ht 1.81 m (5' 11.25\")   Wt 123.8 kg (273 lb)   BMI 37.81 kg/m     Constitutional:well-developed, well-nourished, and in no distress.   Cardiovascular: Intact distal pulses.    Neurological: alert. Gait Abnormal:   Antalgic gait  Skin: Skin is warm and dry.   Psychiatric: Mood and affect normal.   Respiratory: unlabored, speaks in full sentences  Lymph: no LAD, no lymphangitis            Right Knee Exam     Muscle Strength   The patient has normal right knee strength.    Tenderness   The patient is experiencing tenderness in the lateral joint line and medial joint line.    Range of Motion   Extension: normal   Flexion: abnormal     Tests   Patrick:  Medial - negative Lateral - negative  Varus: negative Valgus: negative  Lachman:  Anterior - negative      Drawer:  Anterior - negative    Posterior - negative  Pivot shift: negative  Patellar apprehension: negative    Other   Erythema: absent  Scars: absent  Pulse: " present  Swelling: none  Effusion: no effusion present              X-ray images Ordered and independently reviewed by me in the office today with the patient. X-ray shows:   Recent Results (from the past 744 hour(s))   XR Knee Standing AP Bilat [place] Bilat Lat Right    Narrative    12/15/2018    Very mild right knee medial compartment narrowing suggesting mild   degenerative change, otherwise normal knees with no acute fractures, no   loose bodies evident, no soft tissue abnormalities.       ASSESSMENT/PLAN    ICD-10-CM    1. Acute pain of right knee M25.561 MR Knee Right w/o Contrast   2. Morbid obesity (H) E66.01      Minimal changes suggesting arthritis, hyperflexion injury conducive to meniscus tear.  Discussed options including empiric cortisone injection versus MRI for further analysis, patient opted for the latter.  Once MRI completed he will return for a recheck visit.      Again, thank you for allowing me to participate in the care of your patient.        Sincerely,        Xavier Castellano MD

## 2018-12-14 NOTE — PROGRESS NOTES
[facility]   Clinic Visit s Dec 14, 2018    PCP: Vaibhav Seymour      Amilcar is a 53 year old male who is seen as self referral for   Chief Complaint   Patient presents with     Right Knee - Pain       Injury: Patient describes injury as slipped on a ladder and felt a jam in his right knee.          Location of Pain: right knee anterior , medial and lateral, nonradiating   Duration of Pain: 1 month(s)  Rating of Pain at worst: 5/10  Rating of Pain Currently: 2/10  Pain is better with: activity avoidance   Pain is worse with: twisting and pivoting   Treatment so far consists of: home exercises and rest  Associated symptoms: no distal numbness or tingling; denies swelling or warmth  Recent imaging completed: No recent imaging completed.  Prior History of related problems: none    Social History: is employed as a/an warranty analyst      Past Medical History:   Diagnosis Date     Abnormal glucose      Benign essential hypertension 8/3/2015     Heartburn      Obesity 4/30/2009       Patient Active Problem List    Diagnosis Date Noted     Benign essential hypertension 08/03/2015     Priority: Medium     Hyperlipidemia LDL goal <160 12/09/2013     Priority: Medium     Obesity 04/30/2009     Priority: Medium     Heartburn      Priority: Medium       Family History   Problem Relation Age of Onset     Hypertension Father      Diabetes Mother      C.A.D. Mother      Cerebrovascular Disease Mother      Lipids Mother      Cancer - colorectal Paternal Grandfather         also 8 paternal aunts and uncles with colon CA       Social History     Socioeconomic History     Marital status:      Spouse name:      Number of children: 2     Years of education: Not on file     Highest education level: Not on file   Social Needs     Financial resource strain: Not on file     Food insecurity - worry: Not on file     Food insecurity - inability: Not on file     Transportation needs - medical: Not on  "file     Transportation needs - non-medical: Not on file   Occupational History     Occupation: lenny analyst   Tobacco Use     Smoking status: Former Smoker     Last attempt to quit: 1985     Years since quittin.4     Smokeless tobacco: Never Used   Substance and Sexual Activity     Alcohol use: Yes     Alcohol/week: 0.0 oz     Comment: 1-2 drinks per month       Past Surgical History:   Procedure Laterality Date     C NONSPECIFIC PROCEDURE      hernia surgery as a baby     NO HISTORY OF SURGERY             Review of Systems   Musculoskeletal: Positive for joint pain.   All other systems reviewed and are negative.        Physical Exam   Musculoskeletal:        Right knee: He exhibits no effusion.     /72   Ht 1.81 m (5' 11.25\")   Wt 123.8 kg (273 lb)   BMI 37.81 kg/m    Constitutional:well-developed, well-nourished, and in no distress.   Cardiovascular: Intact distal pulses.    Neurological: alert. Gait Abnormal:   Antalgic gait  Skin: Skin is warm and dry.   Psychiatric: Mood and affect normal.   Respiratory: unlabored, speaks in full sentences  Lymph: no LAD, no lymphangitis            Right Knee Exam     Muscle Strength   The patient has normal right knee strength.    Tenderness   The patient is experiencing tenderness in the lateral joint line and medial joint line.    Range of Motion   Extension: normal   Flexion: abnormal     Tests   Patrick:  Medial - negative Lateral - negative  Varus: negative Valgus: negative  Lachman:  Anterior - negative      Drawer:  Anterior - negative    Posterior - negative  Pivot shift: negative  Patellar apprehension: negative    Other   Erythema: absent  Scars: absent  Pulse: present  Swelling: none  Effusion: no effusion present              X-ray images Ordered and independently reviewed by me in the office today with the patient. X-ray shows:   Recent Results (from the past 744 hour(s))   XR Knee Standing AP Bilat [place] Bilat Lat Right    Narrative    " 12/15/2018    Very mild right knee medial compartment narrowing suggesting mild   degenerative change, otherwise normal knees with no acute fractures, no   loose bodies evident, no soft tissue abnormalities.       ASSESSMENT/PLAN    ICD-10-CM    1. Acute pain of right knee M25.561 MR Knee Right w/o Contrast   2. Morbid obesity (H) E66.01      Minimal changes suggesting arthritis, hyperflexion injury conducive to meniscus tear.  Discussed options including empiric cortisone injection versus MRI for further analysis, patient opted for the latter.  Once MRI completed he will return for a recheck visit.

## 2019-01-02 ENCOUNTER — HOSPITAL ENCOUNTER (OUTPATIENT)
Dept: MRI IMAGING | Facility: CLINIC | Age: 54
Discharge: HOME | End: 2019-01-02
Attending: FAMILY MEDICINE | Admitting: FAMILY MEDICINE
Payer: COMMERCIAL

## 2019-01-02 DIAGNOSIS — M25.561 ACUTE PAIN OF RIGHT KNEE: ICD-10-CM

## 2019-01-02 PROCEDURE — 73721 MRI JNT OF LWR EXTRE W/O DYE: CPT | Mod: RT

## 2019-01-04 ENCOUNTER — TELEPHONE (OUTPATIENT)
Dept: FAMILY MEDICINE | Facility: CLINIC | Age: 54
End: 2019-01-04

## 2019-01-04 DIAGNOSIS — S83.239D COMPLEX TEAR OF MEDIAL MENISCUS OF KNEE AS CURRENT INJURY, UNSPECIFIED LATERALITY, SUBSEQUENT ENCOUNTER: Primary | ICD-10-CM

## 2019-01-04 NOTE — TELEPHONE ENCOUNTER
Reason for Call:  Request for results:    Name of test or procedure: MR KNEE RIGHT WITHOUT CONTRAST    Date of test of procedure: 01/02/2019    Location of the test or procedure:  [facility]    OK to leave the result message on voice mail or with a family member? YES    Phone number Patient can be reached at:  Cell number on file:    Telephone Information:   Mobile [phone]       Additional comments: pt calling for update on results. He is going out of town and would like a call as soon as it is out.    Call taken on 1/4/2019 at 9:06 AM by Burt SCHOFIELD

## 2019-01-04 NOTE — TELEPHONE ENCOUNTER
MRI shows a complex tear of his medial meniscus. Looks like he saw Dr. Castellano for this so I would defer to him for management. Physical therapy will be a good option and he may consider surgical repair.

## 2019-01-04 NOTE — TELEPHONE ENCOUNTER
Telephone Information:   Mobile [phone]     Patient stated he might be leaving out of town possibly tomorrow if not next week. He wanted to know the results to make sure he didn't have to do anything before.    Patient had MR knee w/o contrast done on 1/2/19    Routing to PCP for further review/recommendations/orders.    Patient can be called back at   Telephone Information:   Mobile [phone]         Brook ELKINSN, RN   [facility]

## 2019-01-04 NOTE — TELEPHONE ENCOUNTER
S/w pt and gave md reply below.  Pt states will wait to hear from Dr. Castellano about what the next steps are going to be.  Was off of work for 1.5 weeks due to holidays and laid around.  States his leg felt better.  Now is back to work and walking more and states can feel it but it feels better than when he saw Dr. Castellano on 12/14.    Pt can be reached at [phone].    Shanon COBIAN RN  EP Triage

## 2019-01-07 ENCOUNTER — TELEPHONE (OUTPATIENT)
Dept: ORTHOPEDICS | Facility: CLINIC | Age: 54
End: 2019-01-07

## 2019-01-07 ENCOUNTER — MEDICAL CORRESPONDENCE (OUTPATIENT)
Dept: HEALTH INFORMATION MANAGEMENT | Facility: CLINIC | Age: 54
End: 2019-01-07

## 2019-01-07 ENCOUNTER — OFFICE VISIT (OUTPATIENT)
Dept: ORTHOPEDICS | Facility: CLINIC | Age: 54
End: 2019-01-07
Payer: COMMERCIAL

## 2019-01-07 VITALS — WEIGHT: 261 LBS | DIASTOLIC BLOOD PRESSURE: 90 MMHG | SYSTOLIC BLOOD PRESSURE: 132 MMHG | BODY MASS INDEX: 36.15 KG/M2

## 2019-01-07 DIAGNOSIS — S83.231A COMPLEX TEAR OF MEDIAL MENISCUS OF RIGHT KNEE AS CURRENT INJURY, INITIAL ENCOUNTER: Primary | ICD-10-CM

## 2019-01-07 PROCEDURE — 99203 OFFICE O/P NEW LOW 30 MIN: CPT | Performed by: ORTHOPAEDIC SURGERY

## 2019-01-07 NOTE — PROGRESS NOTES
HISTORY OF PRESENT ILLNESS:    Amilcar Yang is a 53 year old male who is seen in consultation at the request of Dr. Xavier Castellano for right knee pain.  Patient reports onset of injury 6-7 weeks ago after slipping on a ladder.  Patient states he noticed knee pain mid Nonmember. He stated his knee started to get better, and within the last 3 weeks he thinks he slipped and twisted the knee causing increased pain in the knee.     Present symptoms: Right anterior, medial and lateral joint line pain.  Primary pain is anteromedial. Pain is dull, but sharp with twisting motions.  Increased pain with twisting and pivoting. Patient denies catching and locking. Patient reports sensation of knee instability with descending stairs, usually only the first step. Denies weakness of the right knee and leg.  Current knee pain: 1/10, Worst pain: 5/10.  Treatments tried to this point:  Elliptical, personal training.   Orthopedic PMH:  none    Past Medical History:   Diagnosis Date     Abnormal glucose      Benign essential hypertension 8/3/2015     Heartburn      Obesity 4/30/2009       Past Surgical History:   Procedure Laterality Date     C NONSPECIFIC PROCEDURE      hernia surgery as a baby     NO HISTORY OF SURGERY         Family History   Problem Relation Age of Onset     Hypertension Father      Diabetes Mother      C.A.D. Mother      Cerebrovascular Disease Mother      Lipids Mother      Cancer - colorectal Paternal Grandfather         also 8 paternal aunts and uncles with colon CA       Social History     Socioeconomic History     Marital status: Single     Spouse name:      Number of children: 2     Years of education: Not on file     Highest education level: Not on file   Social Needs     Financial resource strain: Not on file     Food insecurity - worry: Not on file     Food insecurity - inability: Not on file     Transportation needs - medical: Not on file     Transportation needs - non-medical: Not on file  "  Occupational History     Occupation: lenny analyst   Tobacco Use     Smoking status: Former Smoker     Last attempt to quit: 1985     Years since quittin.4     Smokeless tobacco: Never Used   Substance and Sexual Activity     Alcohol use: Yes     Alcohol/week: 0.0 oz     Comment: 1-2 drinks per month     Drug use: No     Sexual activity: Yes     Partners: Female   Other Topics Concern     Parent/sibling w/ CABG, MI or angioplasty before 65F 55M? No   Social History Narrative     Not on file       Current Outpatient Medications   Medication Sig Dispense Refill     anastrozole (ARIMIDEX) 1 MG tablet TAKE 1/2 TABLET BY MOUTH WEEKLY AS DIRECTED  3     atorvastatin (LIPITOR) 10 MG tablet TAKE 1 TABLET (10 MG) BY MOUTH DAILY 90 tablet 1     B-D HYPODERMIC NEEDLE 18G X 1-1/2\" MISC See Admin Instructions  3     B-D HYPODERMIC NEEDLE 18G X 1-1/2\" MISC See Admin Instructions  3     B-D INSULIN SYRINGE 25G X 5/8\" 1 ML MISC See Admin Instructions  3     hydrocortisone (ANUSOL-HC) 2.5 % cream Place rectally 2 times daily 30 g 0     lisinopril-hydrochlorothiazide (PRINZIDE/ZESTORETIC) 10-12.5 MG per tablet Take 1 tablet by mouth daily 90 tablet 3     testosterone cypionate (DEPOTESTOSTERONE) 200 MG/ML injection INJECT 0.55 ML SUBCUTANEOUSLY WEEKLY  0       No Known Allergies    REVIEW OF SYSTEMS:  CONSTITUTIONAL:  NEGATIVE for fever, chills, change in weight  INTEGUMENTARY/SKIN:  NEGATIVE for worrisome rashes, moles or lesions  EYES:  NEGATIVE for vision changes or irritation  ENT/MOUTH:  NEGATIVE for ear, mouth and throat problems  RESP:  NEGATIVE for significant cough or SOB  BREAST:  NEGATIVE for masses, tenderness or discharge  CV:  Hypertension  GI:  NEGATIVE for nausea, abdominal pain, heartburn, or change in bowel habits  :  Negative   MUSCULOSKELETAL:  See HPI above  NEURO:  NEGATIVE for weakness, dizziness or paresthesias  ENDOCRINE:  NEGATIVE for temperature intolerance, skin/hair " changes  HEME/ALLERGY/IMMUNE:  NEGATIVE for bleeding problems  PSYCHIATRIC:  NEGATIVE for changes in mood or affect      PHYSICAL EXAM:  /90 (BP Location: Right arm, Patient Position: Chair, Cuff Size: Adult Regular)   Wt 118.4 kg (261 lb)   BMI 36.15 kg/m    Body mass index is 36.15 kg/m .   GENERAL APPEARANCE: healthy, alert and no distress   HEENT: No apparent thyroid megaly. Clear sclera with normal ocular movement  RESPIRATORY: No labored breathing  SKIN: no suspicious lesions or rashes  NEURO: Normal strength and tone, mentation intact and speech normal  VASCULAR: Good pulses, and capillary refill   LYMPH: no lymphadenopathy   PSYCH:  mentation appears normal and affect normal/bright    MUSCULOSKELETAL:  Both knees without deformities, swelling, erythema or ecchymosis  Full range of motion bilaterally  He is able to get up from sitting readily  No limping  Left knee with minimal patellofemoral clicking  Minimal patellofemoral pain, left knee with a palpation    Right knee with medial joint line pain worsened by circumduction maneuver and deep flexion  MCL is not tender  Mild patellofemoral pain with a deflection as well  No effusion noted     ASSESSMENT:  Acute on top of chronic medial meniscus tear, right knee  Small para meniscal cyst  Mild chondral damage, posterior medial femoral condyle    PLAN:    The images of the MRI scan from January 2 of 2019 were visualized.  Findings are thoroughly explained.  He is actually quite functional however, pain in the medial aspect affects his activity level.  He has limitation of doing exercise that he wants to do.  With the lack of significant degenerative changes, the medial meniscus tear which was a combination of acute on top of chronic was felt to be most likely responsible for his symptoms.  Options of further observation versus knee arthroscopy were explained.  Potential risks related to surgery were informed.  After our discussion, he feels comfortable  about doing knee arthroscopy sometime in the future.  This will be scheduled.    Imaging Interpretation:     MR KNEE RIGHT WITHOUT CONTRAST January 2, 2019 6:24 PM    HISTORY: Knee pain, negative x-ray or effusion only. Medial and  lateral joint line pain after hyperflexion injury, evaluate for  meniscus tear. Acute pain of right knee.     TECHNIQUE: Axial and coronal T2 with fat suppression. Coronal T1.  Sagittal dual echo T2.    FINDINGS:   Medial Meniscus: Mild/moderate extrusion of the body segment. Mild  extrusion of the anterior horn. Complex tearing of the posterior horn,  including two vertical components and a horizontal component. There is  extension of both surfaces. There is minimal perimeniscal cyst  formation. There is also some distraction present.       Lateral Meniscus: No tear, displaced fragment, or extrusion.        Anterior Cruciate Ligament: Unremarkable. No sprain or tear  identified.     Posterior Cruciate Ligament: Unremarkable. No sprain or tear  identified.     Medial Collateral Ligament: No sprain or tear identified.    Lateral Collateral Ligament Complex, Popliteus Tendon: The iliotibial  band, fibular collateral ligament, biceps femoris tendon, and  popliteus tendon are intact.    Osseous and Cartilaginous Structures: No chondromalacia is identified.  There is very mild reactive subchondral edema at the posterior aspect  of the medial tibial plateau.    Extensor Mechanism: The quadriceps and patellar tendons are intact.  The medial and lateral patellar retinacula appear unremarkable.    Joint Space: Very mild joint effusion. No definite loose bodies  appreciated.    Additional Findings: No Baker's cyst. No semimembranosus-tibial  collateral ligament or pes anserine bursitis. Nonspecific soft tissue  edema.        Impression:     IMPRESSION:  1. Complex tearing of the posterior horn medial meniscus. Minimal  perimeniscal cyst formation.  2. Very mild effusion.    JEREMIAH HAAS MD            Gustavo Kathleen MD  Department of Orthopedic Surgery        Disclaimer: This note consists of symbols derived from keyboarding, dictation and/or voice recognition software. As a result, there may be errors in the script that have gone undetected. Please consider this when interpreting information found in this chart..

## 2019-01-07 NOTE — LETTER
1/7/2019         RE: Amilcar Yang  [address] [facility] [address]        Dear Colleague,    Thank you for referring your patient, Amilcar Yang, to the [facility] ORTHOPEDIC SURGERY. Please see a copy of my visit note below.    HISTORY OF PRESENT ILLNESS:    Amilcar Yang is a 53 year old male who is seen in consultation at the request of Dr. Xavier Castellano for right knee pain.  Patient reports onset of injury 6-7 weeks ago after slipping on a ladder.  Patient states he noticed knee pain mid Nonmember. He stated his knee started to get better, and within the last 3 weeks he thinks he slipped and twisted the knee causing increased pain in the knee.     Present symptoms: Right anterior, medial and lateral joint line pain.  Primary pain is anteromedial. Pain is dull, but sharp with twisting motions.  Increased pain with twisting and pivoting. Patient denies catching and locking. Patient reports sensation of knee instability with descending stairs, usually only the first step. Denies weakness of the right knee and leg.  Current knee pain: 1/10, Worst pain: 5/10.  Treatments tried to this point:  Elliptical, personal training.   Orthopedic PMH:  none    Past Medical History:   Diagnosis Date     Abnormal glucose      Benign essential hypertension 8/3/2015     Heartburn      Obesity 4/30/2009       Past Surgical History:   Procedure Laterality Date     C NONSPECIFIC PROCEDURE      hernia surgery as a baby     NO HISTORY OF SURGERY         Family History   Problem Relation Age of Onset     Hypertension Father      Diabetes Mother      C.A.D. Mother      Cerebrovascular Disease Mother      Lipids Mother      Cancer - colorectal Paternal Grandfather         also 8 paternal aunts and uncles with colon CA       Social History     Socioeconomic History     Marital status: Single     Spouse name:      Number of children: 2     Years of education: Not on file     Highest education level: Not on  "file   Social Needs     Financial resource strain: Not on file     Food insecurity - worry: Not on file     Food insecurity - inability: Not on file     Transportation needs - medical: Not on file     Transportation needs - non-medical: Not on file   Occupational History     Occupation: lenny analyst   Tobacco Use     Smoking status: Former Smoker     Last attempt to quit: 1985     Years since quittin.4     Smokeless tobacco: Never Used   Substance and Sexual Activity     Alcohol use: Yes     Alcohol/week: 0.0 oz     Comment: 1-2 drinks per month     Drug use: No     Sexual activity: Yes     Partners: Female   Other Topics Concern     Parent/sibling w/ CABG, MI or angioplasty before 65F 55M? No   Social History Narrative     Not on file       Current Outpatient Medications   Medication Sig Dispense Refill     anastrozole (ARIMIDEX) 1 MG tablet TAKE 1/2 TABLET BY MOUTH WEEKLY AS DIRECTED  3     atorvastatin (LIPITOR) 10 MG tablet TAKE 1 TABLET (10 MG) BY MOUTH DAILY 90 tablet 1     B-D HYPODERMIC NEEDLE 18G X 1-1/2\" MISC See Admin Instructions  3     B-D HYPODERMIC NEEDLE 18G X 1-1/2\" MISC See Admin Instructions  3     B-D INSULIN SYRINGE 25G X 5/8\" 1 ML MISC See Admin Instructions  3     hydrocortisone (ANUSOL-HC) 2.5 % cream Place rectally 2 times daily 30 g 0     lisinopril-hydrochlorothiazide (PRINZIDE/ZESTORETIC) 10-12.5 MG per tablet Take 1 tablet by mouth daily 90 tablet 3     testosterone cypionate (DEPOTESTOSTERONE) 200 MG/ML injection INJECT 0.55 ML SUBCUTANEOUSLY WEEKLY  0       No Known Allergies    REVIEW OF SYSTEMS:  CONSTITUTIONAL:  NEGATIVE for fever, chills, change in weight  INTEGUMENTARY/SKIN:  NEGATIVE for worrisome rashes, moles or lesions  EYES:  NEGATIVE for vision changes or irritation  ENT/MOUTH:  NEGATIVE for ear, mouth and throat problems  RESP:  NEGATIVE for significant cough or SOB  BREAST:  NEGATIVE for masses, tenderness or discharge  CV:  Hypertension  GI:  NEGATIVE for " nausea, abdominal pain, heartburn, or change in bowel habits  :  Negative   MUSCULOSKELETAL:  See HPI above  NEURO:  NEGATIVE for weakness, dizziness or paresthesias  ENDOCRINE:  NEGATIVE for temperature intolerance, skin/hair changes  HEME/ALLERGY/IMMUNE:  NEGATIVE for bleeding problems  PSYCHIATRIC:  NEGATIVE for changes in mood or affect      PHYSICAL EXAM:  /90 (BP Location: Right arm, Patient Position: Chair, Cuff Size: Adult Regular)   Wt 118.4 kg (261 lb)   BMI 36.15 kg/m     Body mass index is 36.15 kg/m .   GENERAL APPEARANCE: healthy, alert and no distress   HEENT: No apparent thyroid megaly. Clear sclera with normal ocular movement  RESPIRATORY: No labored breathing  SKIN: no suspicious lesions or rashes  NEURO: Normal strength and tone, mentation intact and speech normal  VASCULAR: Good pulses, and capillary refill   LYMPH: no lymphadenopathy   PSYCH:  mentation appears normal and affect normal/bright    MUSCULOSKELETAL:  Both knees without deformities, swelling, erythema or ecchymosis  Full range of motion bilaterally  He is able to get up from sitting readily  No limping  Left knee with minimal patellofemoral clicking  Minimal patellofemoral pain, left knee with a palpation    Right knee with medial joint line pain worsened by circumduction maneuver and deep flexion  MCL is not tender  Mild patellofemoral pain with a deflection as well  No effusion noted     ASSESSMENT:  Acute on top of chronic medial meniscus tear, right knee  Small para meniscal cyst  Mild chondral damage, posterior medial femoral condyle    PLAN:    The images of the MRI scan from January 2 of 2019 were visualized.  Findings are thoroughly explained.  He is actually quite functional however, pain in the medial aspect affects his activity level.  He has limitation of doing exercise that he wants to do.  With the lack of significant degenerative changes, the medial meniscus tear which was a combination of acute on top of  chronic was felt to be most likely responsible for his symptoms.  Options of further observation versus knee arthroscopy were explained.  Potential risks related to surgery were informed.  After our discussion, he feels comfortable about doing knee arthroscopy sometime in the future.  This will be scheduled.    Imaging Interpretation:     MR KNEE RIGHT WITHOUT CONTRAST January 2, 2019 6:24 PM    HISTORY: Knee pain, negative x-ray or effusion only. Medial and  lateral joint line pain after hyperflexion injury, evaluate for  meniscus tear. Acute pain of right knee.     TECHNIQUE: Axial and coronal T2 with fat suppression. Coronal T1.  Sagittal dual echo T2.    FINDINGS:   Medial Meniscus: Mild/moderate extrusion of the body segment. Mild  extrusion of the anterior horn. Complex tearing of the posterior horn,  including two vertical components and a horizontal component. There is  extension of both surfaces. There is minimal perimeniscal cyst  formation. There is also some distraction present.       Lateral Meniscus: No tear, displaced fragment, or extrusion.        Anterior Cruciate Ligament: Unremarkable. No sprain or tear  identified.     Posterior Cruciate Ligament: Unremarkable. No sprain or tear  identified.     Medial Collateral Ligament: No sprain or tear identified.    Lateral Collateral Ligament Complex, Popliteus Tendon: The iliotibial  band, fibular collateral ligament, biceps femoris tendon, and  popliteus tendon are intact.    Osseous and Cartilaginous Structures: No chondromalacia is identified.  There is very mild reactive subchondral edema at the posterior aspect  of the medial tibial plateau.    Extensor Mechanism: The quadriceps and patellar tendons are intact.  The medial and lateral patellar retinacula appear unremarkable.    Joint Space: Very mild joint effusion. No definite loose bodies  appreciated.    Additional Findings: No Baker's cyst. No semimembranosus-tibial  collateral ligament or pes  anserine bursitis. Nonspecific soft tissue  edema.        Impression:     IMPRESSION:  1. Complex tearing of the posterior horn medial meniscus. Minimal  perimeniscal cyst formation.  2. Very mild effusion.    MD Gustavo ANGELES MD  Department of Orthopedic Surgery        Disclaimer: This note consists of symbols derived from keyboarding, dictation and/or voice recognition software. As a result, there may be errors in the script that have gone undetected. Please consider this when interpreting information found in this chart..    Again, thank you for allowing me to participate in the care of your patient.        Sincerely,        Gustavo Kathleen MD

## 2019-01-07 NOTE — TELEPHONE ENCOUNTER
Type of surgery: Right knee arthroscopy  Location of surgery: [facility]  Date and time of surgery: 1/23/19 2:30 pm  Surgeon: Gutsavo Kathleen  Pre-Op Appt Date: 1/10/19  Post-Op Appt Date: 1/30/19   Packet sent out: Yes  Pre-cert/Authorization completed:  No

## 2019-01-08 ENCOUNTER — OFFICE VISIT (OUTPATIENT)
Dept: INTERNAL MEDICINE | Facility: CLINIC | Age: 54
End: 2019-01-08
Payer: COMMERCIAL

## 2019-01-08 ENCOUNTER — THERAPY VISIT (OUTPATIENT)
Dept: PHYSICAL THERAPY | Facility: CLINIC | Age: 54
End: 2019-01-08
Payer: COMMERCIAL

## 2019-01-08 ENCOUNTER — TELEPHONE (OUTPATIENT)
Dept: ORTHOPEDICS | Facility: CLINIC | Age: 54
End: 2019-01-08

## 2019-01-08 VITALS
DIASTOLIC BLOOD PRESSURE: 86 MMHG | HEIGHT: 71 IN | OXYGEN SATURATION: 97 % | WEIGHT: 260.2 LBS | BODY MASS INDEX: 36.43 KG/M2 | HEART RATE: 94 BPM | TEMPERATURE: 98.1 F | RESPIRATION RATE: 16 BRPM | SYSTOLIC BLOOD PRESSURE: 126 MMHG

## 2019-01-08 DIAGNOSIS — S83.206A TEAR OF MENISCUS OF RIGHT KNEE AS CURRENT INJURY, UNSPECIFIED MENISCUS, UNSPECIFIED TEAR TYPE, INITIAL ENCOUNTER: ICD-10-CM

## 2019-01-08 DIAGNOSIS — M25.561 ACUTE PAIN OF RIGHT KNEE: ICD-10-CM

## 2019-01-08 DIAGNOSIS — Z01.818 PREOP GENERAL PHYSICAL EXAM: Primary | ICD-10-CM

## 2019-01-08 DIAGNOSIS — I10 BENIGN ESSENTIAL HYPERTENSION: ICD-10-CM

## 2019-01-08 DIAGNOSIS — S83.231A COMPLEX TEAR OF MEDIAL MENISCUS OF RIGHT KNEE AS CURRENT INJURY, INITIAL ENCOUNTER: Primary | ICD-10-CM

## 2019-01-08 LAB
ANION GAP SERPL CALCULATED.3IONS-SCNC: 7 MMOL/L (ref 3–14)
BUN SERPL-MCNC: 19 MG/DL (ref 7–30)
CALCIUM SERPL-MCNC: 9.8 MG/DL (ref 8.5–10.1)
CHLORIDE SERPL-SCNC: 105 MMOL/L (ref 94–109)
CO2 SERPL-SCNC: 29 MMOL/L (ref 20–32)
CREAT SERPL-MCNC: 1.01 MG/DL (ref 0.66–1.25)
GFR SERPL CREATININE-BSD FRML MDRD: 85 ML/MIN/{1.73_M2}
GLUCOSE SERPL-MCNC: 98 MG/DL (ref 70–99)
POTASSIUM SERPL-SCNC: 3.5 MMOL/L (ref 3.4–5.3)
SODIUM SERPL-SCNC: 141 MMOL/L (ref 133–144)

## 2019-01-08 PROCEDURE — 97161 PT EVAL LOW COMPLEX 20 MIN: CPT | Mod: GP | Performed by: PHYSICAL THERAPIST

## 2019-01-08 PROCEDURE — 36415 COLL VENOUS BLD VENIPUNCTURE: CPT | Performed by: INTERNAL MEDICINE

## 2019-01-08 PROCEDURE — 97110 THERAPEUTIC EXERCISES: CPT | Mod: GP | Performed by: PHYSICAL THERAPIST

## 2019-01-08 PROCEDURE — 99215 OFFICE O/P EST HI 40 MIN: CPT | Performed by: INTERNAL MEDICINE

## 2019-01-08 PROCEDURE — 97116 GAIT TRAINING THERAPY: CPT | Mod: GP | Performed by: PHYSICAL THERAPIST

## 2019-01-08 PROCEDURE — 80048 BASIC METABOLIC PNL TOTAL CA: CPT | Performed by: INTERNAL MEDICINE

## 2019-01-08 ASSESSMENT — ACTIVITIES OF DAILY LIVING (ADL)
STIFFNESS: I DO NOT HAVE THE SYMPTOM
SQUAT: ACTIVITY IS NOT DIFFICULT
HOW_WOULD_YOU_RATE_THE_OVERALL_FUNCTION_OF_YOUR_KNEE_DURING_YOUR_USUAL_DAILY_ACTIVITIES?: NEARLY NORMAL
KNEE_ACTIVITY_OF_DAILY_LIVING_SUM: 59
LIMPING: THE SYMPTOM AFFECTS MY ACTIVITY MODERATELY
STAND: ACTIVITY IS NOT DIFFICULT
PAIN: THE SYMPTOM AFFECTS MY ACTIVITY SLIGHTLY
RAW_SCORE: 59
RISE FROM A CHAIR: ACTIVITY IS MINIMALLY DIFFICULT
SWELLING: I DO NOT HAVE THE SYMPTOM
GO DOWN STAIRS: ACTIVITY IS SOMEWHAT DIFFICULT
GIVING WAY, BUCKLING OR SHIFTING OF KNEE: I HAVE THE SYMPTOM BUT IT DOES NOT AFFECT MY ACTIVITY
WEAKNESS: I DO NOT HAVE THE SYMPTOM
SIT WITH YOUR KNEE BENT: ACTIVITY IS NOT DIFFICULT
HOW_WOULD_YOU_RATE_THE_CURRENT_FUNCTION_OF_YOUR_KNEE_DURING_YOUR_USUAL_DAILY_ACTIVITIES_ON_A_SCALE_FROM_0_TO_100_WITH_100_BEING_YOUR_LEVEL_OF_KNEE_FUNCTION_PRIOR_TO_YOUR_INJURY_AND_0_BEING_THE_INABILITY_TO_PERFORM_ANY_OF_YOUR_USUAL_DAILY_ACTIVITIES?: 0
KNEEL ON THE FRONT OF YOUR KNEE: ACTIVITY IS NOT DIFFICULT
AS_A_RESULT_OF_YOUR_KNEE_INJURY,_HOW_WOULD_YOU_RATE_YOUR_CURRENT_LEVEL_OF_DAILY_ACTIVITY?: NEARLY NORMAL
KNEE_ACTIVITY_OF_DAILY_LIVING_SCORE: 84.29
GO UP STAIRS: ACTIVITY IS SOMEWHAT DIFFICULT
WALK: ACTIVITY IS NOT DIFFICULT

## 2019-01-08 ASSESSMENT — MIFFLIN-ST. JEOR: SCORE: 2056.35

## 2019-01-08 NOTE — TELEPHONE ENCOUNTER
Per patient request: moved surgery date up to 1/9/19 at the [facility].    Pre-op physical being completed on 1/8/19 by Dr. Pyle at [facility].        Sheron Jerry, Surgery Scheduler

## 2019-01-08 NOTE — PROGRESS NOTES
[address] [address]  [phone]  Dept: [phone]    PRE-OP EVALUATION:  Today's date: 2019    Amilcar Yang (: 1965) presents for pre-operative evaluation assessment as requested by Dr. Kathleen.  He requires evaluation and anesthesia risk assessment prior to undergoing surgery/procedure for treatment of Right knee meniscus tear.    Fax number for surgical facility: [facility]  Primary Physician: Vaibhav Seymour  Type of Anesthesia Anticipated: General    Patient has a Health Care Directive or Living Will:  NO    Preop Questions 2019   Who is doing your surgery? Dr Kathleen   What are you having done? menicus repair   Date of Surgery/Procedure: 19   Facility or Hospital where procedure/surgery will be performed: [facility]   1.  Do you have a history of Heart attack, stroke, stent, coronary bypass surgery, or other heart surgery? No   2.  Do you ever have any pain or discomfort in your chest? No   3.  Do you have a history of  Heart Failure? No   4.   Are you troubled by shortness of breath when:  walking on a level surface, or up a slight hill, or at night? No   5.  Do you currently have a cold, bronchitis or other respiratory infection? No   6.  Do you have a cough, shortness of breath, or wheezing? No   7.  Do you sometimes get pains in the calves of your legs when you walk? No   8. Do you or anyone in your family have previous history of blood clots? No   9.  Do you or does anyone in your family have a serious bleeding problem such as prolonged bleeding following surgeries or cuts? No   10. Have you ever had problems with anemia or been told to take iron pills? No   11. Have you had any abnormal blood loss such as black, tarry or bloody stools? No   12. Have you ever had a blood transfusion? No   13. Have you or any of your relatives ever had problems with anesthesia? No   14. Do you have sleep  "apnea, excessive snoring or daytime drowsiness? No   15. Do you have any prosthetic heart valves? No   16. Do you have prosthetic joints? No         HPI:     Patient states he injured his right leg and knee. W/u revealed a R meniscus tear.       See problem list for active medical problems.  Problems all longstanding and stable, except as noted/documented.  See ROS for pertinent symptoms related to these conditions.                                                                                                                                                          .    MEDICAL HISTORY:     Patient Active Problem List    Diagnosis Date Noted     Acute pain of right knee 01/08/2019     Priority: Medium     Obesity (BMI 35.0-39.9) with comorbidity (H) 12/17/2018     Priority: Medium     Benign essential hypertension 08/03/2015     Priority: Medium     Hyperlipidemia LDL goal <160 12/09/2013     Priority: Medium     Obesity 04/30/2009     Priority: Medium     Heartburn      Priority: Medium      Past Medical History:   Diagnosis Date     Benign essential hypertension 8/3/2015     Heartburn      Hypotestosteronism      Obesity 4/30/2009     Past Surgical History:   Procedure Laterality Date     C NONSPECIFIC PROCEDURE      hernia surgery as a baby     NO HISTORY OF SURGERY       Current Outpatient Medications   Medication Sig Dispense Refill     anastrozole (ARIMIDEX) 1 MG tablet TAKE 1/2 TABLET BY MOUTH WEEKLY AS DIRECTED  3     atorvastatin (LIPITOR) 10 MG tablet TAKE 1 TABLET (10 MG) BY MOUTH DAILY 90 tablet 1     B-D HYPODERMIC NEEDLE 18G X 1-1/2\" MISC See Admin Instructions  3     B-D INSULIN SYRINGE 25G X 5/8\" 1 ML MISC See Admin Instructions  3     lisinopril-hydrochlorothiazide (PRINZIDE/ZESTORETIC) 10-12.5 MG per tablet Take 1 tablet by mouth daily 90 tablet 3     testosterone cypionate (DEPOTESTOSTERONE) 200 MG/ML injection INJECT 0.55 ML SUBCUTANEOUSLY WEEKLY  0     OTC products: None, except as noted " "above    No Known Allergies   Latex Allergy: NO    Social History     Tobacco Use     Smoking status: Former Smoker     Last attempt to quit: 1985     Years since quittin.5     Smokeless tobacco: Never Used   Substance Use Topics     Alcohol use: Yes     Alcohol/week: 0.0 oz     Comment: 1-2 drinks per month     History   Drug Use No       REVIEW OF SYSTEMS:   Constitutional, neuro, ENT, endocrine, pulmonary, cardiac, gastrointestinal, genitourinary, musculoskeletal, integument and psychiatric systems are negative, except as otherwise noted.    EXAM:   /86   Pulse 94   Temp 98.1  F (36.7  C) (Oral)   Resp 16   Ht 1.81 m (5' 11.25\")   Wt 118 kg (260 lb 3.2 oz)   SpO2 97%   BMI 36.04 kg/m      GENERAL APPEARANCE: healthy, alert and no distress     EYES: EOMI,  PERRL     HENT: ear canals and TM's normal and nose and mouth without ulcers or lesions     NECK: no adenopathy, no asymmetry, masses, or scars and thyroid normal to palpation     RESP: lungs clear to auscultation - no rales, rhonchi or wheezes     CV: regular rates and rhythm, normal S1 S2, no S3 or S4 and no murmur, click or rub     ABDOMEN:  soft, nontender, no HSM or masses and bowel sounds normal     MS: extremities normal- no gross deformities noted, no evidence of inflammation in joints, FROM in all extremities.     SKIN: no suspicious lesions or rashes     NEURO: Normal strength and tone, sensory exam grossly normal, mentation intact and speech normal     PSYCH: mentation appears normal. and affect normal/bright     LYMPHATICS: No cervical adenopathy    DIAGNOSTICS:     Labs Resulted Today:   Results for orders placed or performed in visit on 19   Basic metabolic panel   Result Value Ref Range    Sodium 141 133 - 144 mmol/L    Potassium 3.5 3.4 - 5.3 mmol/L    Chloride 105 94 - 109 mmol/L    Carbon Dioxide 29 20 - 32 mmol/L    Anion Gap 7 3 - 14 mmol/L    Glucose 98 70 - 99 mg/dL    Urea Nitrogen 19 7 - 30 mg/dL    Creatinine " 1.01 0.66 - 1.25 mg/dL    GFR Estimate 85 >60 mL/min/[1.73_m2]    GFR Estimate If Black >90 >60 mL/min/[1.73_m2]    Calcium 9.8 8.5 - 10.1 mg/dL       Recent Labs   Lab Test 03/30/18  0800 03/24/17  0931 03/03/16  0721  07/17/15  0926 12/11/13  0735   HGB  --  15.0 15.2  --  15.7 15.3   PLT  --   --  233  --  232  --    INR  --   --   --   --  0.93  --     139 140   < > 139 141   POTASSIUM 3.7 4.0 4.1   < > 3.9 4.6   CR 1.11 1.06 1.03   < > 0.98 1.04   A1C  --   --   --   --  5.8 5.6    < > = values in this interval not displayed.        IMPRESSION:   Reason for surgery/procedure: meniscus tear  Diagnosis/reason for consult: HTN    The proposed surgical procedure is considered LOW risk.    REVISED CARDIAC RISK INDEX  The patient has the following serious cardiovascular risks for perioperative complications such as (MI, PE, VFib and 3  AV Block):  No serious cardiac risks  INTERPRETATION: 0 risks: Class I (very low risk - 0.4% complication rate)    The patient has the following additional risks for perioperative complications:  No identified additional risks      ICD-10-CM    1. Preop general physical exam Z01.818 Basic metabolic panel   2. Tear of meniscus of right knee as current injury, unspecified meniscus, unspecified tear type, initial encounter S83.206A    3. Benign essential hypertension I10 Basic metabolic panel       RECOMMENDATIONS:     ACE Inhibitor or Angiotensin Receptor Blocker (ARB) Use  Ace inhibitor or Angiotensin Receptor Blocker (ARB) and should HOLD this medication for the 24 hours prior to surgery.      APPROVAL GIVEN to proceed with proposed procedure, without further diagnostic evaluation       Signed Electronically by: Edwin Martinez MD    Copy of this evaluation report is provided to requesting physician.    [facility] Preop Guidelines    Revised Cardiac Risk Index

## 2019-01-08 NOTE — PROGRESS NOTES
[facility] Initial Evaluation  Subjective:  The history is provided by the patient. No  was used.   Amilcar Yang is a 53 year old male with a right knee condition.  Condition occurred with:  A fall/slip (missed a rung on a ladder while coming down).  Condition occurred: at home.  This is a new condition  Patient injured his R knee 11/15/2018 when he was coming down a ladder and skipped a rung and twisted it.  He had an MRI which showed a medial meniscus tear.  He will have a R knee scope tomorrow (01/09/2018) and is here for a pre-op visit for crutch training.    Patient reports pain:  Medial and lateral (medial>lateral).    Pain is described as aching and sharp and is intermittent and reported as 3/10.  Associated with: none. Pain is the same all the time.  Exacerbated by: walking, sitting with R knee bent, stairs. and relieved by rest and other (sitting with R knee straighter).  Since onset symptoms are unchanged.  Special tests:  MRI (R medial meniscus tear).  Previous treatment: none.    General health as reported by patient is good.  Pertinent medical history includes:  High blood pressure and overweight.  Medical allergies: no.  Other surgeries include:  None reported.  Current medications:  High blood pressure medication.  Current occupation is Warranty analyst.  Patient is working in normal job without restrictions.  Primary job tasks include:  Prolonged sitting.    Barriers include:  None as reported by the patient.    Red flags:  None as reported by the patient.                        Objective:    Gait:  Deviation due to decreased R knee extension at heelstrike through midstance, decreased R stance time, decreased L step-length  Gait Type:  Antalgic   Assistive Devices:  None                                                        Knee Evaluation:  ROM:    AROM    Hyperextension: Left:     Right: 118, pain  Extension: Left:    Right:  6    PROM    Hyperextension:  Left: 124    Right:  120, pain  Extension: Left: 0    Right:  3        Strength:     Extension:  Right: 5/5    Pain:+  Flexion:  Right: 5/5    Pain:-      Quad Set Right:  Good    Pain: -        Edema:  Normal            General     ROS    Assessment/Plan:    Patient is a 53 year old male with right side knee complaints.  He has a R medial meniscus tear confirmed on MRI and will have a scope on 01/09/2019.  He is here today for pre-op instruction and crutch training in preparation for surgery.  He had a good understanding of exercises to perform post-surgery and was able to demonstrate stable and independent use of crutches in the clinic today.  He will resume PT in 1 week after he has had surgery and will be evaluated again at that time.    Patient has the following significant findings with corresponding treatment plan.                Diagnosis 1:  R medial meniscus tear  Pain -  self management, education and home program  Decreased ROM/flexibility - home program  Impaired gait - home program  Decreased function - home program    Therapy Evaluation Codes:   1) History comprised of:   Personal factors that impact the plan of care:      None.    Comorbidity factors that impact the plan of care are:      None.     Medications impacting care: None.  2) Examination of Body Systems comprised of:   Body structures and functions that impact the plan of care:      Knee.   Activity limitations that impact the plan of care are:      Sitting, Stairs and Walking.  3) Clinical presentation characteristics are:   Stable/Uncomplicated.  4) Decision-Making    Low complexity using standardized patient assessment instrument and/or measureable assessment of functional outcome.  Cumulative Therapy Evaluation is: Low complexity.    Previous and current functional limitations:  (See Goal Flow Sheet for this information)    Short term and Long term goals: (See Goal Flow Sheet for this information)     Communication ability:  Patient  appears to be able to clearly communicate and understand verbal and written communication and follow directions correctly.  Treatment Explanation - The following has been discussed with the patient:   RX ordered/plan of care  Anticipated outcomes  Possible risks and side effects  This patient would benefit from PT intervention to resume normal activities.   Rehab potential is good.    Frequency:  1 X week, once daily  Duration:  for 1 weeks--discharge after today's visit as pt will have surgery 01/09/2019  Discharge Plan:  Achieve all LTG.  Independent in home treatment program.  Reach maximal therapeutic benefit.    Please refer to the daily flowsheet for treatment today, total treatment time and time spent performing 1:1 timed codes.

## 2019-01-08 NOTE — PATIENT INSTRUCTIONS
Before Your Surgery      Call your surgeon if there is any change in your health. This includes signs of a cold or flu (such as a sore throat, runny nose, cough, rash or fever).    Do not smoke, drink alcohol or take over the counter medicine (unless your surgeon or primary care doctor tells you to) for the 24 hours before and after surgery.    If you take prescribed drugs: Follow your doctor s orders about which medicines to take and which to stop until after surgery.    Eating and drinking prior to surgery: follow the instructions from your surgeon    Take a shower or bath the night before surgery. Use the soap your surgeon gave you to gently clean your skin. If you do not have soap from your surgeon, use your regular soap. Do not shave or scrub the surgery site.  Wear clean pajamas and have clean sheets on your bed.     Skip your lisinopril-hydrochlorothiazide tomorrow morning  Switch your atorvastatin to bedtime

## 2019-01-09 ENCOUNTER — TRANSFERRED RECORDS (OUTPATIENT)
Dept: HEALTH INFORMATION MANAGEMENT | Facility: CLINIC | Age: 54
End: 2019-01-09

## 2019-01-16 ENCOUNTER — OFFICE VISIT (OUTPATIENT)
Dept: ORTHOPEDICS | Facility: CLINIC | Age: 54
End: 2019-01-16
Payer: COMMERCIAL

## 2019-01-16 DIAGNOSIS — Z47.89 ORTHOPEDIC AFTERCARE: Primary | ICD-10-CM

## 2019-01-16 PROCEDURE — 99024 POSTOP FOLLOW-UP VISIT: CPT | Performed by: PHYSICIAN ASSISTANT

## 2019-01-16 NOTE — PROGRESS NOTES
HISTORY OF PRESENT ILLNESS:    Amilcar Yang is a 53 year old male who is seen in follow up for Right Knee arthroscopy, partial medial meniscectomy, DOS 1/09/2018, Dr. Kathleen.  Present symptoms: Pt reports dramatic improvement psotop.  Pain relieve, only minor stiffness.  Treatments include OTC meds PRN..  Using nothing for ambulation.  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  No new complaints.  Denies Chest pain, Calve pain, Fever, Chills.    PHYSICAL EXAM:  There were no vitals taken for this visit.  There is no height or weight on file to calculate BMI.   GENERAL APPEARANCE: healthy, alert and no distress   PSYCH:  mentation appears normal and affect normal/bright    MSK:  Right:  Knee.  Ambulates: WNG.  Incision clean and dry, no skin closure present, healing.  Appropriate incisional erythema.   No Ecchymosis.  No calve pain on palpation.  Edema mild effusion at knee none below knee.  CMS: tessa incisional numbness, otherwise grossly intact.  AROM Flexion WNL.    IMAGING INTERPRETATION:  None today.     ASSESSMENT:  Amilcar Yang is a 53 year old male S/P Right Knee arthroscopy, partial medial meniscectomy, DOS 1/09/2018, Dr. Kathleen..    Improved, healing incision.    PLAN:  - Surgery discussed, images reviewed if applicable, and all questions were answered at this time.  - Care instructions given and verbally acknowledged.  - Medications: OTC PRN.   - Physical Therapy: RICE PRN.  - AAT.    Return to clinic PRN    Adan Avina PA-C    Dept. Orthopedic Surgery  [facility]   1/16/2019

## 2019-01-16 NOTE — PATIENT INSTRUCTIONS
Incision Care:  Sutures were removed and Steri-Strips applied in usual fashion.  Keep dry 24-48 hours.  Showering ok after that time, however no soaking or scrubbing of incision for 1 weeks.  Steri-strips will most likely fall off on their own, however they may be removed after 1 weeks with rubbing alcohol if they have not.    If draining or bleeding stops the tape-strips are enough coverage unless you were instructed otherwise or you would like to cover for comfort.  If drainage or bleeding continues please cover with clean dressings.     Gradually increase your activities as you can tolerated them, starting at a level well below what you would normally do.     Scar tissue may develop and be present at or deep to the incision site for several weeks to months which may feel like a lump. Gentle massage to the area when tolerated may help reduce this.   Also the top layers of skin may peel away over the next weeks.    Follow up as needed in clinic.

## 2019-01-21 ENCOUNTER — OFFICE VISIT (OUTPATIENT)
Dept: FAMILY MEDICINE | Facility: CLINIC | Age: 54
End: 2019-01-21
Payer: COMMERCIAL

## 2019-01-21 VITALS
TEMPERATURE: 98.3 F | BODY MASS INDEX: 35.84 KG/M2 | OXYGEN SATURATION: 100 % | WEIGHT: 256 LBS | HEIGHT: 71 IN | DIASTOLIC BLOOD PRESSURE: 68 MMHG | HEART RATE: 81 BPM | SYSTOLIC BLOOD PRESSURE: 118 MMHG

## 2019-01-21 DIAGNOSIS — R21 RASH AND NONSPECIFIC SKIN ERUPTION: Primary | ICD-10-CM

## 2019-01-21 PROCEDURE — 99213 OFFICE O/P EST LOW 20 MIN: CPT | Performed by: FAMILY MEDICINE

## 2019-01-21 RX ORDER — TRIAMCINOLONE ACETONIDE 5 MG/G
OINTMENT TOPICAL
Qty: 15 G | Refills: 0 | Status: SHIPPED | OUTPATIENT
Start: 2019-01-21 | End: 2020-05-06

## 2019-01-21 RX ORDER — CETIRIZINE HYDROCHLORIDE 10 MG/1
10 TABLET ORAL DAILY
Qty: 30 TABLET | Refills: 0 | COMMUNITY
Start: 2019-01-21 | End: 2019-02-20

## 2019-01-21 ASSESSMENT — MIFFLIN-ST. JEOR: SCORE: 2037.3

## 2019-01-21 NOTE — PROGRESS NOTES
SUBJECTIVE:   Amilcar Yang is a 53 year old male who presents to clinic today for the following health issues:      Rash      Duration: x last night - had right knee surgery x 2 weeks ago and believes it might be related     Description  Location: right lower  leg,   Itching: moderate    Intensity:  moderate    Accompanying signs and symptoms: tiny red dots across entire right leg     History (similar episodes/previous evaluation): None    Precipitating or alleviating factors: recent knee surgery , although unable to recall anything new and unsure what may have caused it   New exposures:  None  Recent travel: no      Therapies tried and outcome: none        Problem list and histories reviewed & adjusted, as indicated.  Additional history: as documented    Patient Active Problem List   Diagnosis     Heartburn     Obesity     Hyperlipidemia LDL goal <160     Benign essential hypertension     Obesity (BMI 35.0-39.9) with comorbidity (H)     Acute pain of right knee     Past Surgical History:   Procedure Laterality Date     C NONSPECIFIC PROCEDURE      hernia surgery as a baby     NO HISTORY OF SURGERY         Social History     Tobacco Use     Smoking status: Former Smoker     Last attempt to quit: 1985     Years since quittin.5     Smokeless tobacco: Never Used   Substance Use Topics     Alcohol use: Yes     Alcohol/week: 0.0 oz     Comment: 1-2 drinks per month     Family History   Problem Relation Age of Onset     Hypertension Father      Diabetes Mother      C.A.D. Mother      Cerebrovascular Disease Mother      Lipids Mother      Cancer - colorectal Paternal Grandfather         also 8 paternal aunts and uncles with colon CA           Reviewed and updated as needed this visit by clinical staff       Reviewed and updated as needed this visit by Provider         ROS:  Constitutional, HEENT, cardiovascular, pulmonary, GI, , musculoskeletal, neuro, skin, endocrine and psych systems are negative, except  "as otherwise noted.    OBJECTIVE:     /68   Pulse 81   Temp 98.3  F (36.8  C) (Tympanic)   Ht 1.81 m (5' 11.25\")   Wt 116.1 kg (256 lb)   SpO2 100%   BMI 35.45 kg/m    Body mass index is 35.45 kg/m .  GENERAL: healthy, alert and no distress  NECK: no adenopathy, no asymmetry, masses, or scars and thyroid normal to palpation  NECK: no adenopathy  RESP: lungs clear to auscultation - no rales, rhonchi or wheezes  CV: regular rate and rhythm, normal S1 S2, no S3 or S4  MS:  rt leg with  Rash erythematous slightly raised bumps across entire leg below knee , a lot more I front of leg  no calf tenderness or swelling         ASSESSMENT/PLAN:       (R21) Rash and nonspecific skin eruption  (primary encounter diagnosis)  Comment:   Plan: triamcinolone (KENALOG) 0.5 % external         ointment, cetirizine (ZYRTEC) 10 MG tablet          Skin Cares and symptomatic treatment discussed follow up if problem       Patient expressed understanding and agreement with treatment plan. All patient's questions were answered, will let me know if has more later.  Medications: Rx's: Reviewed the potential side effects/complications of medications prescribed.       Debo Huggins MD  [facility]    "

## 2019-01-21 NOTE — PATIENT INSTRUCTIONS
Use  medications as directed.  Cares and symptomatic cares discussed   Follow up if problem or concern

## 2019-01-22 ENCOUNTER — THERAPY VISIT (OUTPATIENT)
Dept: PHYSICAL THERAPY | Facility: CLINIC | Age: 54
End: 2019-01-22
Payer: COMMERCIAL

## 2019-01-22 DIAGNOSIS — Z47.89 AFTERCARE FOLLOWING SURGERY OF THE MUSCULOSKELETAL SYSTEM: ICD-10-CM

## 2019-01-22 DIAGNOSIS — S83.241A TEAR OF MEDIAL MENISCUS OF RIGHT KNEE, CURRENT: ICD-10-CM

## 2019-01-22 PROCEDURE — 97110 THERAPEUTIC EXERCISES: CPT | Mod: GP | Performed by: PHYSICAL THERAPIST

## 2019-01-22 PROCEDURE — 97530 THERAPEUTIC ACTIVITIES: CPT | Mod: GP | Performed by: PHYSICAL THERAPIST

## 2019-01-22 PROCEDURE — 97161 PT EVAL LOW COMPLEX 20 MIN: CPT | Mod: GP | Performed by: PHYSICAL THERAPIST

## 2019-01-22 ASSESSMENT — ACTIVITIES OF DAILY LIVING (ADL)
WALK: ACTIVITY IS NOT DIFFICULT
KNEE_ACTIVITY_OF_DAILY_LIVING_SUM: 62
AS_A_RESULT_OF_YOUR_KNEE_INJURY,_HOW_WOULD_YOU_RATE_YOUR_CURRENT_LEVEL_OF_DAILY_ACTIVITY?: NEARLY NORMAL
STIFFNESS: THE SYMPTOM AFFECTS MY ACTIVITY SLIGHTLY
STAND: ACTIVITY IS NOT DIFFICULT
WEAKNESS: I DO NOT HAVE THE SYMPTOM
GO DOWN STAIRS: ACTIVITY IS NOT DIFFICULT
GO UP STAIRS: ACTIVITY IS MINIMALLY DIFFICULT
GIVING WAY, BUCKLING OR SHIFTING OF KNEE: I DO NOT HAVE THE SYMPTOM
PAIN: I DO NOT HAVE THE SYMPTOM
SQUAT: ACTIVITY IS NOT DIFFICULT
KNEEL ON THE FRONT OF YOUR KNEE: I AM UNABLE TO DO THE ACTIVITY
RAW_SCORE: 62
SWELLING: I DO NOT HAVE THE SYMPTOM
SIT WITH YOUR KNEE BENT: ACTIVITY IS NOT DIFFICULT
HOW_WOULD_YOU_RATE_THE_OVERALL_FUNCTION_OF_YOUR_KNEE_DURING_YOUR_USUAL_DAILY_ACTIVITIES?: NEARLY NORMAL
HOW_WOULD_YOU_RATE_THE_CURRENT_FUNCTION_OF_YOUR_KNEE_DURING_YOUR_USUAL_DAILY_ACTIVITIES_ON_A_SCALE_FROM_0_TO_100_WITH_100_BEING_YOUR_LEVEL_OF_KNEE_FUNCTION_PRIOR_TO_YOUR_INJURY_AND_0_BEING_THE_INABILITY_TO_PERFORM_ANY_OF_YOUR_USUAL_DAILY_ACTIVITIES?: 90
KNEE_ACTIVITY_OF_DAILY_LIVING_SCORE: 88.57
RISE FROM A CHAIR: ACTIVITY IS NOT DIFFICULT
LIMPING: I DO NOT HAVE THE SYMPTOM

## 2019-01-22 NOTE — PROGRESS NOTES
[facility] Initial Evaluation  Subjective:  The history is provided by the patient. No  was used.   Amilcar Yang is a 53 year old male with a right knee condition.  Condition occurred with:  A fall/slip.  Condition occurred: at home.  This is a new condition  Patient had a R knee scope 01/09/2019.    Patient reports pain:  Anterior and medial.    Pain is described as aching and sharp and is intermittent and reported as 2/10.  Associated symptoms:  Loss of motion/stiffness. Pain is the same all the time.  Exacerbated by: stairs, kneeling. and relieved by rest.  Since onset symptoms are gradually improving.  Special testing: none since surgery.  Previous treatment: none.    General health as reported by patient is good.  Pertinent medical history includes:  Overweight and high blood pressure.  Medical allergies: no.  Other surgeries include:  None reported.  Current medications:  High blood pressure medication.  Current occupation is Warranty analyst.  Patient is working in normal job without restrictions.  Primary job tasks include:  Prolonged sitting and prolonged standing.    Barriers include:  None as reported by the patient.    Red flags:  None as reported by the patient.                        Objective:    Gait:  Mild deviation due to slightly decreased R knee extension at heelstrike through midstance  Gait Type:  Normal   Weight Bearing Status:  WBAT                                                           Knee Evaluation:  ROM:  Strength:  Normal  AROM      Extension: Left:    Right:  5  Flexion: Left:   Right: 120  PROM      Extension: Left:   Right:  2  Flexion: Left:   Right:  122              Edema:  Edema of the knee: mild swelling in R knee--normal for post-op status.            General     ROS    Assessment/Plan:    Patient is a 53 year old male with right side knee complaints s/p scope and medial menisectomy on 01/09/2019.  He is doing very well since  surgery.  He has very minimal pain and only mild ROM deficits.  He should make good progress with treatment focusing on ROM and strengthening exercises to improve overall function.      Patient has the following significant findings with corresponding treatment plan.                Diagnosis 1:  S/p R knee scope and medial menisectomy  Pain -  self management, education and home program  Decreased ROM/flexibility - therapeutic exercise and home program  Impaired gait - gait training and home program  Decreased function - therapeutic activities and home program    Therapy Evaluation Codes:    1) History comprised of:   Personal factors that impact the plan of care:      None.    Comorbidity factors that impact the plan of care are:      None.     Medications impacting care: None.  2) Examination of Body Systems comprised of:   Body structures and functions that impact the plan of care:      Knee.   Activity limitations that impact the plan of care are:      stairs, kneeling.  3) Clinical presentation characteristics are:   Stable/Uncomplicated.  4) Decision-Making    Low complexity using standardized patient assessment instrument and/or measureable assessment of functional outcome.  Cumulative Therapy Evaluation is: Low complexity.    Previous and current functional limitations:  (See Goal Flow Sheet for this information)    Short term and Long term goals: (See Goal Flow Sheet for this information)     Communication ability:  Patient appears to be able to clearly communicate and understand verbal and written communication and follow directions correctly.  Treatment Explanation - The following has been discussed with the patient:   RX ordered/plan of care  Anticipated outcomes  Possible risks and side effects  This patient would benefit from PT intervention to resume normal activities.   Rehab potential is good.    Frequency:  2 X a month, once daily  Duration:  for 1 months tapering to 1 X a month over 1  months  Discharge Plan:  Achieve all LTG.  Independent in home treatment program.  Reach maximal therapeutic benefit.    Please refer to the daily flowsheet for treatment today, total treatment time and time spent performing 1:1 timed codes.

## 2019-02-04 ENCOUNTER — THERAPY VISIT (OUTPATIENT)
Dept: PHYSICAL THERAPY | Facility: CLINIC | Age: 54
End: 2019-02-04
Payer: COMMERCIAL

## 2019-02-04 DIAGNOSIS — S83.241D TEAR OF MEDIAL MENISCUS OF RIGHT KNEE, CURRENT, UNSPECIFIED TEAR TYPE, SUBSEQUENT ENCOUNTER: ICD-10-CM

## 2019-02-04 DIAGNOSIS — M25.561 ACUTE PAIN OF RIGHT KNEE: ICD-10-CM

## 2019-02-04 DIAGNOSIS — Z47.89 AFTERCARE FOLLOWING SURGERY OF THE MUSCULOSKELETAL SYSTEM: ICD-10-CM

## 2019-02-04 PROCEDURE — 97110 THERAPEUTIC EXERCISES: CPT | Mod: GP | Performed by: PHYSICAL THERAPIST

## 2019-02-04 PROCEDURE — 97530 THERAPEUTIC ACTIVITIES: CPT | Mod: GP | Performed by: PHYSICAL THERAPIST

## 2019-02-04 NOTE — PROGRESS NOTES
Subjective:  HPI                    Objective:  System    Physical Exam    General     ROS    Assessment/Plan:    DISCHARGE REPORT    Progress reporting period is from 01/22/2019 to 02/04/2019.       SUBJECTIVE  Subjective: Patient reports his R knee is doing well overall.  He can ascend stairs reciprocally without problems, but descending reciprocally is still mildly painful in the R medial knee.  He has been able to 10 minutes on the bike and 1 hour on the elliptical at the gym without problems.  Kneeling is less painful--only 1-2/10 pain now.  He plans to continue with his HEP independently at this point.      Current Pain level: 1/10.     Initial Pain level: 2/10.   Changes in function:  Yes, able to negotiate stairs reciprocally, near normal walking with minimal to no pain, able to kneel with less pain.  Adverse reaction to treatment or activity: None    OBJECTIVE  Objective: R knee AROM:  3-123 degrees.  PROM:  0-126 degrees.  Strength:  MMT 5/5 flexion and extension.  Mild strength deficits with functional activities of squats and steps.  Gait:  Mild deviation due to decreased R knee extension at heelstrike through midstance, slightly decreased R stance time.       ASSESSMENT/PLAN  Patient has been seen for 2 visits since his R knee scope and medial menisectomy on 01/09/2019.  He has made excellent progress since surgery.  ROM and strength have improved and are near normal.  He still lacks 3 degrees of active extension, and this impacts his gait pattern, but this will normalize with time.  He has a good understanding of his HEP and demonstrates good compliance.  He should do well continuing independently at this time.    Updated problem list and treatment plan: Diagnosis 1:  S/p R knee scope and medial menisectomy  Pain -  self management, education, directional preference exercise and home program  Decreased ROM/flexibility - home program  Decreased strength - home program  Impaired gait - home  program  Decreased function - home program  STG/LTGs have been met or progress has been made towards goals:  Yes (See Goal flow sheet completed today.)  Assessment of Progress: The patient's condition is improving.  Self Management Plans:  Patient has been instructed in a home treatment program.  Patient is independent in a home treatment program.  Patient  has been instructed in self management of symptoms.  Patient is independent in self management of symptoms.  Amilcar continues to require the following intervention to meet STG and LTG's:  PT intervention is no longer required to meet STG/LTG.    Recommendations:  This patient is ready to be discharged from therapy and continue their home treatment program.    Please refer to the daily flowsheet for treatment today, total treatment time and time spent performing 1:1 timed codes.

## 2019-03-17 DIAGNOSIS — E78.5 HYPERLIPIDEMIA, UNSPECIFIED HYPERLIPIDEMIA TYPE: ICD-10-CM

## 2019-03-19 RX ORDER — ATORVASTATIN CALCIUM 10 MG/1
TABLET, FILM COATED ORAL
Qty: 30 TABLET | Refills: 0 | Status: SHIPPED | OUTPATIENT
Start: 2019-03-19 | End: 2019-04-20

## 2019-03-19 NOTE — TELEPHONE ENCOUNTER
30 day supply given.  Patient is due for an OV.  Please call and assist with scheduling appointment prior to next refill     Shanon COBIAN RN  EP Triage

## 2019-03-19 NOTE — TELEPHONE ENCOUNTER
Sandi message sent.      .Kenzie BURRELL    [facility] [place]

## 2019-03-20 NOTE — TELEPHONE ENCOUNTER
Spoke with patient and scheduled him to see pcp 03/24/2019      .Kenzie BURRELL    [facility] [place]

## 2019-03-27 ENCOUNTER — OFFICE VISIT (OUTPATIENT)
Dept: FAMILY MEDICINE | Facility: CLINIC | Age: 54
End: 2019-03-27
Payer: COMMERCIAL

## 2019-03-27 VITALS
TEMPERATURE: 98.1 F | HEART RATE: 68 BPM | DIASTOLIC BLOOD PRESSURE: 80 MMHG | WEIGHT: 261 LBS | SYSTOLIC BLOOD PRESSURE: 124 MMHG | BODY MASS INDEX: 36.15 KG/M2

## 2019-03-27 DIAGNOSIS — E78.5 HYPERLIPIDEMIA, UNSPECIFIED HYPERLIPIDEMIA TYPE: ICD-10-CM

## 2019-03-27 DIAGNOSIS — E78.5 HYPERLIPIDEMIA LDL GOAL <160: Primary | ICD-10-CM

## 2019-03-27 DIAGNOSIS — I10 BENIGN ESSENTIAL HYPERTENSION: ICD-10-CM

## 2019-03-27 PROCEDURE — 99214 OFFICE O/P EST MOD 30 MIN: CPT | Performed by: FAMILY MEDICINE

## 2019-03-27 RX ORDER — LISINOPRIL/HYDROCHLOROTHIAZIDE 10-12.5 MG
1 TABLET ORAL DAILY
Qty: 90 TABLET | Refills: 3 | Status: SHIPPED | OUTPATIENT
Start: 2019-03-27 | End: 2020-05-05

## 2019-03-27 RX ORDER — ATORVASTATIN CALCIUM 10 MG/1
10 TABLET, FILM COATED ORAL DAILY
Qty: 30 TABLET | Refills: 0 | Status: CANCELLED | OUTPATIENT
Start: 2019-03-27

## 2019-03-27 NOTE — PROGRESS NOTES
SUBJECTIVE:   Amilcar Yang is a 53 year old male who presents to clinic today for the following health issues:      Hyperlipidemia Follow-Up      Rate your low fat/cholesterol diet?: fair    Taking statin?  Yes, no muscle aches from statin    Other lipid medications/supplements?:  none    Hypertension Follow-up      Outpatient blood pressures are being checked at home.  Results are normal, once or twice per week.    Low Salt Diet: no added salt      Amount of exercise or physical activity: 3 days per week    Problems taking medications regularly: No    Medication side effects: none    Diet: regular (no restrictions) and low salt          Problem list and histories reviewed & adjusted, as indicated.  Additional history: as documented    Patient Active Problem List   Diagnosis     Heartburn     Obesity     Hyperlipidemia LDL goal <160     Benign essential hypertension     Obesity (BMI 35.0-39.9) with comorbidity (H)     Acute pain of right knee     Aftercare following surgery of the musculoskeletal system     Tear of medial meniscus of right knee, current     Past Surgical History:   Procedure Laterality Date     ARTHROSCOPY KNEE RT/LT Right 2019    Right Knee arthroscopy, partial medial meniscectomy, DOS 2018, Dr. Gustavo Kathleen --[facility].     C NONSPECIFIC PROCEDURE      hernia surgery as a baby     NO HISTORY OF SURGERY         Social History     Tobacco Use     Smoking status: Former Smoker     Last attempt to quit: 1985     Years since quittin.7     Smokeless tobacco: Never Used   Substance Use Topics     Alcohol use: Yes     Alcohol/week: 0.0 oz     Comment: 1-2 drinks per month     Family History   Problem Relation Age of Onset     Hypertension Father      Diabetes Mother      C.A.D. Mother      Cerebrovascular Disease Mother      Lipids Mother      Cancer - colorectal Paternal Grandfather         also 8 paternal aunts and uncles with colon CA         Current Outpatient  "Medications   Medication Sig Dispense Refill     anastrozole (ARIMIDEX) 1 MG tablet TAKE 1/2 TABLET BY MOUTH WEEKLY AS DIRECTED  3     atorvastatin (LIPITOR) 10 MG tablet TAKE 1 TABLET BY MOUTH EVERY DAY 30 tablet 0     lisinopril-hydrochlorothiazide (PRINZIDE/ZESTORETIC) 10-12.5 MG tablet Take 1 tablet by mouth daily 90 tablet 3     testosterone cypionate (DEPOTESTOSTERONE) 200 MG/ML injection INJECT 0.55 ML SUBCUTANEOUSLY WEEKLY  0     B-D HYPODERMIC NEEDLE 18G X 1-1/2\" MISC See Admin Instructions  3     B-D INSULIN SYRINGE 25G X 5/8\" 1 ML MISC See Admin Instructions  3     triamcinolone (KENALOG) 0.5 % external ointment Apply sparingly tid as needed (Patient not taking: Reported on 3/27/2019) 15 g 0       Reviewed and updated as needed this visit by clinical staff  Tobacco  Meds       Reviewed and updated as needed this visit by Provider         ROS:  CONSTITUTIONAL: NEGATIVE for fever, chills, change in weight  ENT/MOUTH: NEGATIVE for ear, mouth and throat problems  RESP: NEGATIVE for significant cough or SOB  CV: NEGATIVE for chest pain, palpitations or peripheral edema    OBJECTIVE:                                                    /80   Pulse 68   Temp 98.1  F (36.7  C) (Tympanic)   Wt 118.4 kg (261 lb)   BMI 36.15 kg/m    Body mass index is 36.15 kg/m .   GENERAL: healthy, alert, well nourished, well hydrated, no distress  HENT: ear canals- normal; TMs- normal; Nose- normal; Mouth- no ulcers, no lesions  NECK: no tenderness, no adenopathy, no asymmetry, no masses, no stiffness; thyroid- normal to palpation  RESP: lungs clear to auscultation - no rales, no rhonchi, no wheezes  CV: regular rates and rhythm, normal S1 S2, no S3 or S4 and no murmur, no click or rub -         ASSESSMENT/PLAN:                                                        ICD-10-CM    1. Hyperlipidemia LDL goal <160 E78.5 Lipid panel reflex to direct LDL Fasting     Comprehensive metabolic panel   2. Hyperlipidemia, " unspecified hyperlipidemia type E78.5    3. Benign essential hypertension I10 lisinopril-hydrochlorothiazide (PRINZIDE/ZESTORETIC) 10-12.5 MG tablet       Patient labs ordered.  Once done we will follow-up on that and fill his cholesterol medication.  Patient is stable medication refilled.  Discussed with him low-salt diet regular exercise.  Eat healthy.  Patient  has some questions regarding his testosterone supplement.  I am not aware of his labs, he is advised he should check with his prescribing provider, if he needs it long-term or not.    Vaibhav Seymour MD  [facility]

## 2019-04-03 DIAGNOSIS — E78.5 HYPERLIPIDEMIA LDL GOAL <160: ICD-10-CM

## 2019-04-03 LAB
ALBUMIN SERPL-MCNC: 3.8 G/DL (ref 3.4–5)
ALP SERPL-CCNC: 101 U/L (ref 40–150)
ALT SERPL W P-5'-P-CCNC: 45 U/L (ref 0–70)
ANION GAP SERPL CALCULATED.3IONS-SCNC: 2 MMOL/L (ref 3–14)
AST SERPL W P-5'-P-CCNC: 27 U/L (ref 0–45)
BILIRUB SERPL-MCNC: 0.7 MG/DL (ref 0.2–1.3)
BUN SERPL-MCNC: 17 MG/DL (ref 7–30)
CALCIUM SERPL-MCNC: 9.1 MG/DL (ref 8.5–10.1)
CHLORIDE SERPL-SCNC: 107 MMOL/L (ref 94–109)
CHOLEST SERPL-MCNC: 143 MG/DL
CO2 SERPL-SCNC: 32 MMOL/L (ref 20–32)
CREAT SERPL-MCNC: 1.07 MG/DL (ref 0.66–1.25)
GFR SERPL CREATININE-BSD FRML MDRD: 79 ML/MIN/{1.73_M2}
GLUCOSE SERPL-MCNC: 94 MG/DL (ref 70–99)
HDLC SERPL-MCNC: 45 MG/DL
LDLC SERPL CALC-MCNC: 85 MG/DL
NONHDLC SERPL-MCNC: 98 MG/DL
POTASSIUM SERPL-SCNC: 4.1 MMOL/L (ref 3.4–5.3)
PROT SERPL-MCNC: 7.4 G/DL (ref 6.8–8.8)
SODIUM SERPL-SCNC: 141 MMOL/L (ref 133–144)
TRIGL SERPL-MCNC: 65 MG/DL

## 2019-04-03 PROCEDURE — 36415 COLL VENOUS BLD VENIPUNCTURE: CPT | Performed by: INTERNAL MEDICINE

## 2019-04-03 PROCEDURE — 80061 LIPID PANEL: CPT | Performed by: INTERNAL MEDICINE

## 2019-04-03 PROCEDURE — 80053 COMPREHEN METABOLIC PANEL: CPT | Performed by: INTERNAL MEDICINE

## 2019-04-20 DIAGNOSIS — E78.5 HYPERLIPIDEMIA, UNSPECIFIED HYPERLIPIDEMIA TYPE: ICD-10-CM

## 2019-04-22 RX ORDER — ATORVASTATIN CALCIUM 10 MG/1
TABLET, FILM COATED ORAL
Qty: 90 TABLET | Refills: 3 | Status: SHIPPED | OUTPATIENT
Start: 2019-04-22 | End: 2020-05-05

## 2019-04-22 NOTE — TELEPHONE ENCOUNTER
Refill approved through [facility] protocol.  Casi Chawla RN  [facility]  [phone]

## 2019-04-22 NOTE — TELEPHONE ENCOUNTER
"Requested Prescriptions   Pending Prescriptions Disp Refills     atorvastatin (LIPITOR) 10 MG tablet [Pharmacy Med Name: ATORVASTATIN 10 MG TABLET] 30 tablet 0     Sig: TAKE 1 TABLET BY MOUTH EVERY DAY       Statins Protocol Passed - 4/20/2019 11:36 AM        Passed - LDL on file in past 12 months     Recent Labs   Lab Test 04/03/19  0929   LDL 85             Passed - No abnormal creatine kinase in past 12 months     No lab results found.             Passed - Recent (12 mo) or future (30 days) visit within the authorizing provider's specialty     Patient had office visit in the last 12 months or has a visit in the next 30 days with authorizing provider or within the authorizing provider's specialty.  See \"Patient Info\" tab in inbasket, or \"Choose Columns\" in Meds & Orders section of the refill encounter.              Passed - Medication is active on med list        Passed - Patient is age 18 or older        atorvastatin (LIPITOR) 10 MG tablet 30 tablet 0 3/19/2019       Last Written Prescription Date:  03/19/2019  Last Fill Quantity: 30,  # refills: 0   Last office visit: 3/27/2019 with prescribing provider:   Dr. Seymour   Future Office Visit:  Unknown     "

## 2019-10-01 ENCOUNTER — HEALTH MAINTENANCE LETTER (OUTPATIENT)
Age: 54
End: 2019-10-01

## 2020-05-06 ENCOUNTER — VIRTUAL VISIT (OUTPATIENT)
Dept: FAMILY MEDICINE | Facility: CLINIC | Age: 55
End: 2020-05-06
Payer: COMMERCIAL

## 2020-05-06 DIAGNOSIS — E78.5 HYPERLIPIDEMIA LDL GOAL <160: ICD-10-CM

## 2020-05-06 DIAGNOSIS — Z12.5 SCREENING FOR PROSTATE CANCER: ICD-10-CM

## 2020-05-06 DIAGNOSIS — I10 BENIGN ESSENTIAL HYPERTENSION: Primary | ICD-10-CM

## 2020-05-06 PROBLEM — M25.561 ACUTE PAIN OF RIGHT KNEE: Status: RESOLVED | Noted: 2019-01-08 | Resolved: 2020-05-06

## 2020-05-06 PROCEDURE — 99214 OFFICE O/P EST MOD 30 MIN: CPT | Mod: 95 | Performed by: FAMILY MEDICINE

## 2020-05-06 RX ORDER — ATORVASTATIN CALCIUM 10 MG/1
10 TABLET, FILM COATED ORAL DAILY
Qty: 90 TABLET | Refills: 3 | Status: SHIPPED | OUTPATIENT
Start: 2020-05-06 | End: 2021-06-03

## 2020-05-06 RX ORDER — LISINOPRIL/HYDROCHLOROTHIAZIDE 10-12.5 MG
1 TABLET ORAL DAILY
Qty: 90 TABLET | Refills: 3 | Status: SHIPPED | OUTPATIENT
Start: 2020-05-06 | End: 2021-06-03

## 2020-05-06 NOTE — PROGRESS NOTES
"Amilcar Yang is a 54 year old male who is being evaluated via a billable video visit.      The patient has been notified of following:     \"This video visit will be conducted via a call between you and your physician/provider. We have found that certain health care needs can be provided without the need for an in-person physical exam.  This service lets us provide the care you need with a video conversation.  If a prescription is necessary we can send it directly to your pharmacy.  If lab work is needed we can place an order for that and you can then stop by our lab to have the test done at a later time.    Video visits are billed at different rates depending on your insurance coverage.  Please reach out to your insurance provider with any questions.    If during the course of the call the physician/provider feels a video visit is not appropriate, you will not be charged for this service.\"    Patient has given verbal consent for Video visit? Yes    How would you like to obtain your AVS? JoaquinDickson    Patient would like the video invitation sent by: Text to cell phone: [phone]    Will anyone else be joining your video visit? No        Subjective     Amilcar Yang is a 54 year old male who presents to clinic today for the following health issues:    HPI  Hyperlipidemia Follow-Up      Are you regularly taking any medication or supplement to lower your cholesterol?   Yes- atorvastatin     Are you having muscle aches or other side effects that you think could be caused by your cholesterol lowering medication?  No    Hypertension Follow-up      Do you check your blood pressure regularly outside of the clinic? Yes      Are you following a low salt diet? No    Are your blood pressures ever more than 140 on the top number (systolic) OR more   than 90 on the bottom number (diastolic), for example 140/90? No      How many servings of fruits and vegetables do you eat daily?  0-1    On average, how many sweetened " beverages do you drink each day (Examples: soda, juice, sweet tea, etc.  Do NOT count diet or artificially sweetened beverages)?   0    How many days per week do you exercise enough to make your heart beat faster? 7    How many days per week do you miss taking your medication? 0    Medication Followup of atorvastatin and lisinopril     Taking Medication as prescribed: yes    Side Effects:  None    Medication Helping Symptoms:  yes       Video Start Time:09:03        Patient Active Problem List   Diagnosis     Heartburn     Obesity     Hyperlipidemia LDL goal <160     Benign essential hypertension     Obesity (BMI 35.0-39.9) with comorbidity (H)     Aftercare following surgery of the musculoskeletal system     Tear of medial meniscus of right knee, current     Past Surgical History:   Procedure Laterality Date     ARTHROSCOPY KNEE RT/LT Right 2019    Right Knee arthroscopy, partial medial meniscectomy, DOS 2018, Dr. Gustavo Kathleen --[facility].     C NONSPECIFIC PROCEDURE      hernia surgery as a baby     NO HISTORY OF SURGERY         Social History     Tobacco Use     Smoking status: Former Smoker     Last attempt to quit: 1985     Years since quittin.8     Smokeless tobacco: Never Used   Substance Use Topics     Alcohol use: Yes     Alcohol/week: 0.0 standard drinks     Comment: 1-2 drinks per month     Family History   Problem Relation Age of Onset     Hypertension Father      Diabetes Mother      C.A.D. Mother      Cerebrovascular Disease Mother      Lipids Mother      Cancer - colorectal Paternal Grandfather         also 8 paternal aunts and uncles with colon CA           Reviewed and updated as needed this visit by Provider         Review of Systems   ROS COMP: Constitutional, HEENT, cardiovascular, pulmonary, gi and gu systems are negative, except as otherwise noted.      Objective    There were no vitals taken for this visit.  Estimated body mass index is 36.15 kg/m  as calculated  "from the following:    Height as of 1/21/19: 1.81 m (5' 11.25\").    Weight as of 3/27/19: 118.4 kg (261 lb).  Physical Exam     GENERAL: healthy, alert and no distress  EYES: Eyes grossly normal to inspection, conjunctivae and sclerae normal  RESP: no audible wheeze, cough, or visible cyanosis.  No visible retractions or increased work of breathing.  Able to speak fully in complete sentences.  NEURO: Cranial nerves grossly intact, mentation intact and speech normal  PSYCH: mentation appears normal, affect normal/bright, judgement and insight intact, normal speech and appearance well-groomed      Diagnostic Test Results:  Labs reviewed in Epic        Assessment & Plan     1. Benign essential hypertension  BP is stable, he is working out and also lost some weight. Doing regular exercise. Medication refilled, advice to get blood work done in next 1 month to makes ure kidney functions are normal , while on BP medication  - lisinopril-hydrochlorothiazide (ZESTORETIC) 10-12.5 MG tablet; Take 1 tablet by mouth daily  Dispense: 90 tablet; Refill: 3    2. Hyperlipidemia LDL goal <160    - atorvastatin (LIPITOR) 10 MG tablet; Take 1 tablet (10 mg) by mouth daily  Dispense: 90 tablet; Refill: 3  - Lipid panel reflex to direct LDL Fasting; Future  - Comprehensive metabolic panel; Future    3. Screening for prostate cancer    - PSA, screen; Future           Follow up in 6 months for recheck or physical if needed  Vaibhav Syemour MD  [facility] [place]      Video-Visit Details    Type of service:  Video Visit    Video End Time:9:11    Originating Location (pt. Location): Home    Distant Location (provider location):  [facility] [place] [facility]     Platform used for Video Visit: Doximity    No follow-ups on file.       Vaibhav Seymour MD        "

## 2020-06-11 DIAGNOSIS — Z12.5 SCREENING FOR PROSTATE CANCER: ICD-10-CM

## 2020-06-11 DIAGNOSIS — E78.5 HYPERLIPIDEMIA LDL GOAL <160: ICD-10-CM

## 2020-06-11 LAB
ALBUMIN SERPL-MCNC: 4 G/DL (ref 3.4–5)
ALP SERPL-CCNC: 107 U/L (ref 40–150)
ALT SERPL W P-5'-P-CCNC: 39 U/L (ref 0–70)
ANION GAP SERPL CALCULATED.3IONS-SCNC: 3 MMOL/L (ref 3–14)
AST SERPL W P-5'-P-CCNC: 32 U/L (ref 0–45)
BILIRUB SERPL-MCNC: 0.7 MG/DL (ref 0.2–1.3)
BUN SERPL-MCNC: 14 MG/DL (ref 7–30)
CALCIUM SERPL-MCNC: 9.1 MG/DL (ref 8.5–10.1)
CHLORIDE SERPL-SCNC: 106 MMOL/L (ref 94–109)
CHOLEST SERPL-MCNC: 161 MG/DL
CO2 SERPL-SCNC: 28 MMOL/L (ref 20–32)
CREAT SERPL-MCNC: 1.06 MG/DL (ref 0.66–1.25)
GFR SERPL CREATININE-BSD FRML MDRD: 79 ML/MIN/{1.73_M2}
GLUCOSE SERPL-MCNC: 88 MG/DL (ref 70–99)
HDLC SERPL-MCNC: 52 MG/DL
LDLC SERPL CALC-MCNC: 89 MG/DL
NONHDLC SERPL-MCNC: 109 MG/DL
POTASSIUM SERPL-SCNC: 4 MMOL/L (ref 3.4–5.3)
PROT SERPL-MCNC: 7.9 G/DL (ref 6.8–8.8)
PSA SERPL-ACNC: 0.26 UG/L (ref 0–4)
SODIUM SERPL-SCNC: 137 MMOL/L (ref 133–144)
TRIGL SERPL-MCNC: 101 MG/DL

## 2020-06-11 PROCEDURE — G0103 PSA SCREENING: HCPCS | Performed by: FAMILY MEDICINE

## 2020-06-11 PROCEDURE — 80053 COMPREHEN METABOLIC PANEL: CPT | Performed by: FAMILY MEDICINE

## 2020-06-11 PROCEDURE — 36415 COLL VENOUS BLD VENIPUNCTURE: CPT | Performed by: FAMILY MEDICINE

## 2020-06-11 PROCEDURE — 80061 LIPID PANEL: CPT | Performed by: FAMILY MEDICINE

## 2021-01-15 ENCOUNTER — HEALTH MAINTENANCE LETTER (OUTPATIENT)
Age: 56
End: 2021-01-15

## 2021-05-19 ENCOUNTER — VIRTUAL VISIT (OUTPATIENT)
Dept: FAMILY MEDICINE | Facility: CLINIC | Age: 56
End: 2021-05-19
Payer: COMMERCIAL

## 2021-05-19 DIAGNOSIS — F40.243 FEAR OF FLYING: Primary | ICD-10-CM

## 2021-05-19 PROCEDURE — 99213 OFFICE O/P EST LOW 20 MIN: CPT | Mod: 95 | Performed by: FAMILY MEDICINE

## 2021-05-19 RX ORDER — ALPRAZOLAM 0.5 MG
0.5 TABLET ORAL
Qty: 3 TABLET | Refills: 0 | Status: SHIPPED | OUTPATIENT
Start: 2021-05-19 | End: 2021-06-09

## 2021-05-19 NOTE — PROGRESS NOTES
Amilcar is a 55 year old who is being evaluated via a billable video visit.      How would you like to obtain your AVS? MyChart  If the video visit is dropped, the invitation should be resent by: Text to cell phone: [phone]  Will anyone else be joining your video visit? No      Video Start Time: 1:50    Assessment & Plan     Fear of flying    - ALPRAZolam (XANAX) 0.5 MG tablet; Take 1 tablet (0.5 mg) by mouth once as needed for anxiety (30 min before flight)          No follow-ups on file.    Vaibhav Seymour MD  [facility]    Subjective   Amilcar is a 55 year old who presents for the following health issues     HPI     Flight Anxiety    Description: Has a flight scheduled for June and would like to try something for anxiety/claustrophobia   Therapies tried and outcome: deep breathing     No flowsheet data found.  No flowsheet data found.      Review of Systems   Constitutional, HEENT, cardiovascular, pulmonary, gi and gu systems are negative, except as otherwise noted.      Objective           Vitals:  No vitals were obtained today due to virtual visit.    Physical Exam   GENERAL: Healthy, alert and no distress  EYES: Eyes grossly normal to inspection.  No discharge or erythema, or obvious scleral/conjunctival abnormalities.  RESP: No audible wheeze, cough, or visible cyanosis.  No visible retractions or increased work of breathing.    SKIN: Visible skin clear. No significant rash, abnormal pigmentation or lesions.  NEURO: Cranial nerves grossly intact.  Mentation and speech appropriate for age.  PSYCH: Mentation appears normal, affect normal/bright, judgement and insight intact, normal speech and appearance well-groomed.                Video-Visit Details    Type of service:  Video Visit    Video End Time:2:04 PM    Originating Location (pt. Location): Home    Distant Location (provider location):  [facility]     Platform used for Video Visit: FreeBrie

## 2021-05-31 DIAGNOSIS — E78.5 HYPERLIPIDEMIA LDL GOAL <160: ICD-10-CM

## 2021-05-31 DIAGNOSIS — I10 BENIGN ESSENTIAL HYPERTENSION: ICD-10-CM

## 2021-06-03 RX ORDER — LISINOPRIL/HYDROCHLOROTHIAZIDE 10-12.5 MG
TABLET ORAL
Qty: 90 TABLET | Refills: 0 | Status: SHIPPED | OUTPATIENT
Start: 2021-06-03 | End: 2021-06-25

## 2021-06-03 RX ORDER — ATORVASTATIN CALCIUM 10 MG/1
TABLET, FILM COATED ORAL
Qty: 90 TABLET | Refills: 0 | Status: SHIPPED | OUTPATIENT
Start: 2021-06-03 | End: 2021-06-25

## 2021-06-03 NOTE — TELEPHONE ENCOUNTER
Medication is being filled for 1 time refill only due to:  Patient needs to be seen because for physical and labs.     Shanon COBIAN RN  EP Triage

## 2021-06-09 ENCOUNTER — OFFICE VISIT (OUTPATIENT)
Dept: FAMILY MEDICINE | Facility: CLINIC | Age: 56
End: 2021-06-09
Payer: COMMERCIAL

## 2021-06-09 ENCOUNTER — NURSE TRIAGE (OUTPATIENT)
Dept: FAMILY MEDICINE | Facility: CLINIC | Age: 56
End: 2021-06-09

## 2021-06-09 VITALS
WEIGHT: 265.4 LBS | HEART RATE: 85 BPM | OXYGEN SATURATION: 98 % | HEIGHT: 71 IN | RESPIRATION RATE: 14 BRPM | DIASTOLIC BLOOD PRESSURE: 78 MMHG | TEMPERATURE: 98.1 F | BODY MASS INDEX: 37.15 KG/M2 | SYSTOLIC BLOOD PRESSURE: 130 MMHG

## 2021-06-09 DIAGNOSIS — F41.0 PANIC ANXIETY SYNDROME: Primary | ICD-10-CM

## 2021-06-09 PROCEDURE — 99214 OFFICE O/P EST MOD 30 MIN: CPT | Performed by: PHYSICIAN ASSISTANT

## 2021-06-09 RX ORDER — MIRTAZAPINE 7.5 MG/1
7.5-15 TABLET, FILM COATED ORAL AT BEDTIME
Qty: 60 TABLET | Refills: 0 | Status: SHIPPED | OUTPATIENT
Start: 2021-06-09 | End: 2021-07-01

## 2021-06-09 RX ORDER — BUSPIRONE HYDROCHLORIDE 5 MG/1
5 TABLET ORAL 3 TIMES DAILY
Qty: 90 TABLET | Refills: 0 | Status: SHIPPED | OUTPATIENT
Start: 2021-06-09 | End: 2021-06-25

## 2021-06-09 ASSESSMENT — ANXIETY QUESTIONNAIRES
6. BECOMING EASILY ANNOYED OR IRRITABLE: SEVERAL DAYS
7. FEELING AFRAID AS IF SOMETHING AWFUL MIGHT HAPPEN: SEVERAL DAYS
3. WORRYING TOO MUCH ABOUT DIFFERENT THINGS: MORE THAN HALF THE DAYS
5. BEING SO RESTLESS THAT IT IS HARD TO SIT STILL: SEVERAL DAYS
2. NOT BEING ABLE TO STOP OR CONTROL WORRYING: MORE THAN HALF THE DAYS
IF YOU CHECKED OFF ANY PROBLEMS ON THIS QUESTIONNAIRE, HOW DIFFICULT HAVE THESE PROBLEMS MADE IT FOR YOU TO DO YOUR WORK, TAKE CARE OF THINGS AT HOME, OR GET ALONG WITH OTHER PEOPLE: VERY DIFFICULT
GAD7 TOTAL SCORE: 11
1. FEELING NERVOUS, ANXIOUS, OR ON EDGE: MORE THAN HALF THE DAYS

## 2021-06-09 ASSESSMENT — PAIN SCALES - GENERAL: PAINLEVEL: NO PAIN (0)

## 2021-06-09 ASSESSMENT — MIFFLIN-ST. JEOR: SCORE: 2065.98

## 2021-06-09 ASSESSMENT — PATIENT HEALTH QUESTIONNAIRE - PHQ9
SUM OF ALL RESPONSES TO PHQ QUESTIONS 1-9: 9
5. POOR APPETITE OR OVEREATING: MORE THAN HALF THE DAYS

## 2021-06-09 NOTE — TELEPHONE ENCOUNTER
Patient reports ongoing severe anxiety and multiple panic attacks x 1 week. Previously only had anxiety and panic attacks when flying. Patient flew to [place] last week, discussed with PCP. Symptoms persisted throughout flight and entire stay in [place]. Symptoms have continued to persist since returning.     At time of call, patient is tearful and breathing rapidly.    RN provided therapeutic listening and responded to patient in calm voice. At end of call, patient's breathing had returned to normal rate and symptoms had mildly improved.    Per protocol, should be seen today. RN warm transferred to central scheduling to assist.     RN instructed patient to go to ED/UC if symptoms change or worsen before appointment.     Patient verbalized understanding and in agreement with plan of care.       Reason for Disposition    Patient sounds very upset or troubled to the triager    Additional Information    Negative: Severe difficulty breathing (e.g., struggling for each breath, speaks in single words)    Negative: Bluish (or gray) lips or face    Negative: Difficult to awaken or acting confused  (e.g., disoriented, slurred speech)    Negative: Hysterical or combative behavior    Negative: Sounds like a life-threatening emergency to the triager    Negative: Chest pain    Negative: Palpitations, skipped heart beat, or rapid heart beat    Negative: Cough is main symptom    Negative: Suicide thoughts, threats, attempts, or questions    Negative: Depression is main problem or symptom (e.g., feelings of sadness or hopelessness)    Negative: Alcohol or drug abuse, known or suspected, and feeling very shaky (i.e., visible tremors of hands)    Negative: Patient sounds very sick or weak to the triager    Negative: Difficulty breathing and persists > 10 minutes and not relieved by reassurance provided by triager    Negative: Lightheadedness or dizziness and persists > 10 minutes and not relieved by reassurance provided by  "triager    Answer Assessment - Initial Assessment Questions  1. CONCERN: \"What happened that made you call today?\"      Recent travel, AC when out  2. ANXIETY SYMPTOM SCREENING: \"Can you describe how you have been feeling?\"  (e.g., tense, restless, panicky, anxious, keyed up, trouble sleeping, trouble concentrating)      Panicky, anxious, claustrophobic, trouble sleeping  3. ONSET: \"How long have you been feeling this way?\"      Couple weeks  4. RECURRENT: \"Have you felt this way before?\"  If yes: \"What happened that time?\" \"What helped these feelings go away in the past?\"       Never this bad. Usually on have it while flying or doctor's office  5. RISK OF HARM - SUICIDAL IDEATION:  \"Do you ever have thoughts of hurting or killing yourself?\"  (e.g., yes, no, no but preoccupation with thoughts about death)    - INTENT:  \"Do you have thoughts of hurting or killing yourself right NOW?\" (e.g., yes, no, N/A)    - PLAN: \"Do you have a specific plan for how you would do this?\" (e.g., gun, knife, overdose, no plan, N/A)      no  6. RISK OF HARM - HOMICIDAL IDEATION:  \"Do you ever have thoughts of hurting or killing someone else?\"  (e.g., yes, no, no but preoccupation with thoughts about death)    - INTENT:  \"Do you have thoughts of hurting or killing someone right NOW?\" (e.g., yes, no, N/A)    - PLAN: \"Do you have a specific plan for how you would do this?\" (e.g., gun, knife, no plan, N/A)       no  7. FUNCTIONAL IMPAIRMENT: \"How have things been going for you overall in your life? Have you had any more difficulties than usual doing your normal daily activities?\"  (e.g., better, same, worse; self-care, school, work, interactions)      AC went out. Working from home - haven't been out much.   Vacation - took daughter to [place] - too hot to stay with daughter  8. SUPPORT: \"Who is with you now?\" \"Who do you live with?\" \"Do you have family or friends nearby who you can talk to?\"       Lives alone, stayed at friend's house last " "night  9. THERAPIST: \"Do you have a counselor or therapist? Name?\"      No  10. STRESSORS: \"Has there been any new stress or recent changes in your life?\"        Travel, AC out  11. CAFFEINE ABUSE: \"Do you drink caffeinated beverages, and how much each day?\" (e.g., coffee, tea, ilda)        Ilda  12. SUBSTANCE ABUSE: \"Do you use any illegal drugs or alcohol?\"        CBD - helped during the day. Avoids alcohol, one drink in last week  13. OTHER SYMPTOMS: \"Do you have any other physical symptoms right now?\" (e.g., chest pain, palpitations, difficulty breathing, fever)        Increased breathing with panicking - relieved with walking, nausea,  Denies chest pain, confirms muscle tension throughout shoulders, chest, abdomen etc. Described chest pressure when panic attack peaks, relieved when panic attack subsides  14. PREGNANCY: \"Is there any chance you are pregnant?\" \"When was your last menstrual period?\"        no    Protocols used: ANXIETY AND PANIC ATTACK-A-OH      "

## 2021-06-09 NOTE — PROGRESS NOTES
Assessment & Plan       ICD-10-CM    1. Panic anxiety syndrome  F41.0 busPIRone (BUSPAR) 5 MG tablet     mirtazapine (REMERON) 7.5 MG tablet     Patient with severe exacerbation of panic and anxiety. Do not recommend BZD for daily use due to high level of dependence with these medications. Will start Remeron at bedtime, start with 7.5 mg and increase to 15 mg as tolerated. This medication can cause drowsiness, so should help with sleep too. Buspar TID prn panic/anxiety symptoms. Reassess in 2 weeks.    Return in about 16 days (around 2021) for Recheck, Annual exam.    Angela Ventura PA-C  [facility] DEWEY Fitzgerald is a 55 year old who presents for the following health issues     HPI     Anxiety Follow-Up    How are you doing with your anxiety since your last visit? Worsened    Are you having other symptoms that might be associated with anxiety? No    Have you had a significant life event? Job Concerns     Are you feeling depressed? No    Do you have any concerns with your use of alcohol or other drugs? No    Social History     Tobacco Use     Smoking status: Former Smoker     Quit date: 1985     Years since quittin.9     Smokeless tobacco: Never Used   Substance Use Topics     Alcohol use: Yes     Alcohol/week: 0.0 standard drinks     Comment: 1-2 drinks per month     Drug use: No     No flowsheet data found.  No flowsheet data found.        How many servings of fruits and vegetables do you eat daily?  0-1    On average, how many sweetened beverages do you drink each day (Examples: soda, juice, sweet tea, etc.  Do NOT count diet or artificially sweetened beverages)?   0    How many days per week do you exercise enough to make your heart beat faster? 5    How many minutes a day do you exercise enough to make your heart beat faster? 30 - 60    How many days per week do you miss taking your medication? 0    - Patient historically has panic symptoms when flying;  "has difficulty getting on the plane and during takeoff/landing. Feels closed in & suffocated, gets very warm and anxious. Was given Xanax for prn use by his PCP.  - Flew to [place] with his daughter three days ago. Developed severe anxiety the day before and it continued even after landing. Patient had to fly back early the next day due to anxiety. Patient's AC broke, so has been staying with a friend, which isn't helping. Continues to feel overly warm, anxious, and closed in; not sleeping well.    Review of Systems   Constitutional, HEENT, cardiovascular, pulmonary, GI, , musculoskeletal, neuro, skin, endocrine and psych systems are negative, except as otherwise noted.      Objective    /78   Pulse 85   Temp 98.1  F (36.7  C) (Tympanic)   Resp 14   Ht 1.803 m (5' 11\")   Wt 120.4 kg (265 lb 6.4 oz)   SpO2 98%   BMI 37.02 kg/m    Body mass index is 37.02 kg/m .  Physical Exam   GENERAL:  WDWN, no acute distress  PSYCH: Mental Status Exam  Behavior: cooperative and pleasant  Speech: normal rate and rhythm  Mood: anxious  Affect: Tearful  Thought Processes: Logical and Linear  Thought Content: no overt psychosis  Insight: Fair  Judgment: Adequate for safety  EYES: no discharge, no injection  HENT:  Normocephalic. Moist mucus membranes.  NECK:  Supple, symmetric  HEART:  Regular rate & rhythm. No murmur, gallop, or rub.  EXTREMITIES:  No gross deformities, moves all 4 limbs spontaneously, no peripheral edema  SKIN:  Warm and dry, no rash or suspicious lesions    NEUROLOGIC: alert, sensation grossly intact.                "

## 2021-06-10 ASSESSMENT — ANXIETY QUESTIONNAIRES: GAD7 TOTAL SCORE: 11

## 2021-06-25 ENCOUNTER — OFFICE VISIT (OUTPATIENT)
Dept: FAMILY MEDICINE | Facility: CLINIC | Age: 56
End: 2021-06-25
Payer: COMMERCIAL

## 2021-06-25 VITALS
BODY MASS INDEX: 37.24 KG/M2 | DIASTOLIC BLOOD PRESSURE: 80 MMHG | WEIGHT: 266 LBS | RESPIRATION RATE: 16 BRPM | SYSTOLIC BLOOD PRESSURE: 138 MMHG | TEMPERATURE: 98 F | OXYGEN SATURATION: 99 % | HEIGHT: 71 IN | HEART RATE: 74 BPM

## 2021-06-25 DIAGNOSIS — K21.00 GASTROESOPHAGEAL REFLUX DISEASE WITH ESOPHAGITIS WITHOUT HEMORRHAGE: ICD-10-CM

## 2021-06-25 DIAGNOSIS — E78.5 HYPERLIPIDEMIA LDL GOAL <160: ICD-10-CM

## 2021-06-25 DIAGNOSIS — Z11.4 SCREENING FOR HIV (HUMAN IMMUNODEFICIENCY VIRUS): Primary | ICD-10-CM

## 2021-06-25 DIAGNOSIS — I10 BENIGN ESSENTIAL HYPERTENSION: ICD-10-CM

## 2021-06-25 DIAGNOSIS — F41.9 ANXIETY: ICD-10-CM

## 2021-06-25 DIAGNOSIS — Z12.5 SCREENING FOR PROSTATE CANCER: ICD-10-CM

## 2021-06-25 DIAGNOSIS — Z00.00 ENCOUNTER FOR ANNUAL PHYSICAL EXAM: ICD-10-CM

## 2021-06-25 DIAGNOSIS — Z11.59 NEED FOR HEPATITIS C SCREENING TEST: ICD-10-CM

## 2021-06-25 DIAGNOSIS — E66.01 MORBID OBESITY (H): ICD-10-CM

## 2021-06-25 DIAGNOSIS — Z23 NEED FOR VACCINATION: ICD-10-CM

## 2021-06-25 PROBLEM — Z47.89 AFTERCARE FOLLOWING SURGERY OF THE MUSCULOSKELETAL SYSTEM: Status: RESOLVED | Noted: 2019-01-22 | Resolved: 2021-06-25

## 2021-06-25 LAB
ALBUMIN SERPL-MCNC: 3.7 G/DL (ref 3.4–5)
ALP SERPL-CCNC: 116 U/L (ref 40–150)
ALT SERPL W P-5'-P-CCNC: 43 U/L (ref 0–70)
ANION GAP SERPL CALCULATED.3IONS-SCNC: 4 MMOL/L (ref 3–14)
AST SERPL W P-5'-P-CCNC: 29 U/L (ref 0–45)
BILIRUB SERPL-MCNC: 0.4 MG/DL (ref 0.2–1.3)
BUN SERPL-MCNC: 15 MG/DL (ref 7–30)
CALCIUM SERPL-MCNC: 9.5 MG/DL (ref 8.5–10.1)
CHLORIDE SERPL-SCNC: 107 MMOL/L (ref 94–109)
CHOLEST SERPL-MCNC: 174 MG/DL
CO2 SERPL-SCNC: 28 MMOL/L (ref 20–32)
CREAT SERPL-MCNC: 1.11 MG/DL (ref 0.66–1.25)
GFR SERPL CREATININE-BSD FRML MDRD: 74 ML/MIN/{1.73_M2}
GLUCOSE SERPL-MCNC: 96 MG/DL (ref 70–99)
HCV AB SERPL QL IA: NONREACTIVE
HDLC SERPL-MCNC: 48 MG/DL
HIV 1+2 AB+HIV1 P24 AG SERPL QL IA: NONREACTIVE
LDLC SERPL CALC-MCNC: 105 MG/DL
NONHDLC SERPL-MCNC: 126 MG/DL
POTASSIUM SERPL-SCNC: 3.9 MMOL/L (ref 3.4–5.3)
PROT SERPL-MCNC: 7.5 G/DL (ref 6.8–8.8)
PSA SERPL-ACNC: 0.28 UG/L (ref 0–4)
SODIUM SERPL-SCNC: 139 MMOL/L (ref 133–144)
TRIGL SERPL-MCNC: 104 MG/DL

## 2021-06-25 PROCEDURE — 90714 TD VACC NO PRESV 7 YRS+ IM: CPT | Performed by: FAMILY MEDICINE

## 2021-06-25 PROCEDURE — 87389 HIV-1 AG W/HIV-1&-2 AB AG IA: CPT | Performed by: FAMILY MEDICINE

## 2021-06-25 PROCEDURE — 90750 HZV VACC RECOMBINANT IM: CPT | Performed by: FAMILY MEDICINE

## 2021-06-25 PROCEDURE — G0103 PSA SCREENING: HCPCS | Performed by: FAMILY MEDICINE

## 2021-06-25 PROCEDURE — 99213 OFFICE O/P EST LOW 20 MIN: CPT | Mod: 25 | Performed by: FAMILY MEDICINE

## 2021-06-25 PROCEDURE — 99396 PREV VISIT EST AGE 40-64: CPT | Mod: 25 | Performed by: FAMILY MEDICINE

## 2021-06-25 PROCEDURE — 80061 LIPID PANEL: CPT | Performed by: FAMILY MEDICINE

## 2021-06-25 PROCEDURE — 80053 COMPREHEN METABOLIC PANEL: CPT | Performed by: FAMILY MEDICINE

## 2021-06-25 PROCEDURE — 90471 IMMUNIZATION ADMIN: CPT | Performed by: FAMILY MEDICINE

## 2021-06-25 PROCEDURE — 36415 COLL VENOUS BLD VENIPUNCTURE: CPT | Performed by: FAMILY MEDICINE

## 2021-06-25 PROCEDURE — 90472 IMMUNIZATION ADMIN EACH ADD: CPT | Performed by: FAMILY MEDICINE

## 2021-06-25 PROCEDURE — 86803 HEPATITIS C AB TEST: CPT | Performed by: FAMILY MEDICINE

## 2021-06-25 RX ORDER — LISINOPRIL/HYDROCHLOROTHIAZIDE 10-12.5 MG
1 TABLET ORAL DAILY
Qty: 90 TABLET | Refills: 2 | Status: SHIPPED | OUTPATIENT
Start: 2021-08-03 | End: 2022-05-23

## 2021-06-25 RX ORDER — ATORVASTATIN CALCIUM 10 MG/1
10 TABLET, FILM COATED ORAL DAILY
Qty: 90 TABLET | Refills: 2 | Status: SHIPPED | OUTPATIENT
Start: 2021-08-03 | End: 2022-05-23

## 2021-06-25 SDOH — HEALTH STABILITY: MENTAL HEALTH: HOW OFTEN DO YOU HAVE 6 OR MORE DRINKS ON ONE OCCASION?: NOT ASKED

## 2021-06-25 SDOH — HEALTH STABILITY: MENTAL HEALTH: HOW MANY STANDARD DRINKS CONTAINING ALCOHOL DO YOU HAVE ON A TYPICAL DAY?: 1 OR 2

## 2021-06-25 SDOH — HEALTH STABILITY: MENTAL HEALTH: HOW OFTEN DO YOU HAVE A DRINK CONTAINING ALCOHOL?: MONTHLY OR LESS

## 2021-06-25 ASSESSMENT — ENCOUNTER SYMPTOMS
MYALGIAS: 0
FEVER: 0
PALPITATIONS: 0
JOINT SWELLING: 0
DYSURIA: 0
FREQUENCY: 0
COUGH: 0
DIZZINESS: 0
CHILLS: 0
HEMATOCHEZIA: 0
ABDOMINAL PAIN: 0
DIARRHEA: 0
HEARTBURN: 0
PARESTHESIAS: 0
NAUSEA: 0
SORE THROAT: 0
HEADACHES: 0
HEMATURIA: 0
ARTHRALGIAS: 0
WEAKNESS: 0
EYE PAIN: 0
CONSTIPATION: 0
NERVOUS/ANXIOUS: 1
SHORTNESS OF BREATH: 1

## 2021-06-25 ASSESSMENT — PAIN SCALES - GENERAL: PAINLEVEL: NO PAIN (0)

## 2021-06-25 ASSESSMENT — MIFFLIN-ST. JEOR: SCORE: 2063.7

## 2021-06-25 NOTE — PROGRESS NOTES
SUBJECTIVE:   CC: Amilcar Yang is an 56 year old male who presents for preventative health visit.     Patient has been advised of split billing requirements and indicates understanding: Yes  Healthy Habits:     Getting at least 3 servings of Calcium per day:  Yes    Bi-annual eye exam:  Yes    Dental care twice a year:  Yes    Sleep apnea or symptoms of sleep apnea:  Daytime drowsiness and Excessive snoring    Diet:  Regular (no restrictions)    Frequency of exercise:  4-5 days/week    Duration of exercise:  45-60 minutes    Taking medications regularly:  Yes    Medication side effects:  None    PHQ-2 Total Score: 2    Additional concerns today:  No    Patient has episode of anxiety secondary to going back to work along with flying.  Seen by my colleague and prescribed   Remeron and as needed buspar.  He is taking Remeron which has helped with sleep and anxiety has not taken buspar.      Hyperlipidemia Follow-Up      Are you regularly taking any medication or supplement to lower your cholesterol?   Yes- Atorvastatin    Are you having muscle aches or other side effects that you think could be caused by your cholesterol lowering medication?  No    Hypertension Follow-up  Blood pressure is stable.  He is taking his medication    Do you check your blood pressure regularly outside of the clinic? Yes     Are you following a low salt diet? Yes    Are your blood pressures ever more than 140 on the top number (systolic) OR more   than 90 on the bottom number (diastolic), for example 140/90? No      Today's PHQ-2 Score:   PHQ-2 ( 1999 Pf[employer]) 6/25/2021   Q1: Little interest or pleasure in doing things 1   Q2: Feeling down, depressed or hopeless 1   PHQ-2 Score 2   Q1: Little interest or pleasure in doing things Several days   Q2: Feeling down, depressed or hopeless Several days   PHQ-2 Score 2       Abuse: Current or Past(Physical, Sexual or Emotional)- No  Do you feel safe in your environment? Yes    Have you ever done  Advance Care Planning? (For example, a Health Directive, POLST, or a discussion with a medical provider or your loved ones about your wishes): Yes, patient states has an Advance Care Planning document and will bring a copy to the clinic.    Social History     Tobacco Use     Smoking status: Former Smoker     Quit date: 1985     Years since quittin.9     Smokeless tobacco: Never Used   Substance Use Topics     Alcohol use: Yes     Frequency: Monthly or less     Drinks per session: 1 or 2         Alcohol Use 2021   Prescreen: >3 drinks/day or >7 drinks/week? No       Last PSA:   PSA   Date Value Ref Range Status   2020 0.26 0 - 4 ug/L Final     Comment:     Assay Method:  Chemiluminescence using Siemens Vista analyzer       Reviewed orders with patient. Reviewed health maintenance and updated orders accordingly - Yes  Lab work is in process    Reviewed and updated as needed this visit by clinical staff  Tobacco  Allergies  Meds      Soc Hx        Reviewed and updated as needed this visit by Provider                    Review of Systems   Constitutional: Negative for chills and fever.   HENT: Negative for congestion, ear pain, hearing loss and sore throat.    Eyes: Negative for pain and visual disturbance.   Respiratory: Positive for shortness of breath. Negative for cough.    Cardiovascular: Negative for chest pain, palpitations and peripheral edema.   Gastrointestinal: Negative for abdominal pain, constipation, diarrhea, heartburn, hematochezia and nausea.   Genitourinary: Negative for discharge, dysuria, frequency, genital sores, hematuria, impotence and urgency.   Musculoskeletal: Negative for arthralgias, joint swelling and myalgias.   Skin: Negative for rash.   Neurological: Negative for dizziness, weakness, headaches and paresthesias.   Psychiatric/Behavioral: Negative for mood changes. The patient is nervous/anxious.      CONSTITUTIONAL: NEGATIVE for fever, chills, change in  "weight  INTEGUMENTARY/SKIN: NEGATIVE for worrisome rashes, moles or lesions  EYES: NEGATIVE for vision changes or irritation  ENT: NEGATIVE for ear, mouth and throat problems  RESP: NEGATIVE for significant cough or SOB  CV: NEGATIVE for chest pain, palpitations or peripheral edema  GI: NEGATIVE for nausea, abdominal pain, heartburn, or change in bowel habits   male: negative for dysuria, hematuria, decreased urinary stream, erectile dysfunction, urethral discharge  MUSCULOSKELETAL: NEGATIVE for significant arthralgias or myalgia  NEURO: NEGATIVE for weakness, dizziness or paresthesias  PSYCHIATRIC: NEGATIVE for changes in mood or affect    OBJECTIVE:   /80   Pulse 74   Temp 98  F (36.7  C) (Tympanic)   Resp 16   Ht 1.803 m (5' 11\")   Wt 120.7 kg (266 lb)   SpO2 99%   BMI 37.10 kg/m      Physical Exam  GENERAL: healthy, alert and no distress  EYES: Eyes grossly normal to inspection, PERRL and conjunctivae and sclerae normal  HENT: ear canals and TM's normal, nose and mouth without ulcers or lesions  NECK: no adenopathy, no asymmetry, masses, or scars and thyroid normal to palpation  RESP: lungs clear to auscultation - no rales, rhonchi or wheezes  CV: regular rate and rhythm, normal S1 S2, no S3 or S4, no murmur, click or rub, no peripheral edema and peripheral pulses strong  ABDOMEN: soft, nontender, no hepatosplenomegaly, no masses and bowel sounds normal  MS: no gross musculoskeletal defects noted, no edema  SKIN: no suspicious lesions or rashes  NEURO: Normal strength and tone, mentation intact and speech normal  Mood is normal anxiety is under control      ASSESSMENT/PLAN:   Annual physical:  Labs are ordered once done we will follow-up on those.  Patient has some degree of diabetes secondary to flying and going back to work.  He is getting much better in terms of that.  Remeron is helping.  He is advised to continue that and slowly taper down over the next month.  He can stop BuSpar.  If he has " "long-term anxiety issues I would suggest selective serotonin reuptake inhibitor.        1. Screening for HIV (human immunodeficiency virus)    - HIV Antigen Antibody Combo    2. Need for hepatitis C screening test    - Hepatitis C Screen Reflex to HCV RNA Quant and Genotype    3. Hyperlipidemia LDL goal <160    - Comprehensive metabolic panel  - Lipid panel reflex to direct LDL Fasting    4. Obesity (BMI 35.0-39.9) with comorbidity (H)      5. Anxiety      6. Gastroesophageal reflux disease with esophagitis without hemorrhage  Well-controlled    7. Screening for prostate cancer    - Prostate spec antigen screen    8. Benign essential hypertension  We will refill his medication once labs reviewed.  - Comprehensive metabolic panel    9. Need for vaccination    - SHINGRIX [[id number]]  - TD PRSERV FREE >=7 YRS ADS IM [[id number]]    Patient has been advised of split billing requirements and indicates understanding: Yes  COUNSELING:   Reviewed preventive health counseling, as reflected in patient instructions       Regular exercise       Healthy diet/nutrition    Estimated body mass index is 37.1 kg/m  as calculated from the following:    Height as of this encounter: 1.803 m (5' 11\").    Weight as of this encounter: 120.7 kg (266 lb).         He reports that he quit smoking about 35 years ago. He has never used smokeless tobacco.      Counseling Resources:  ATP IV Guidelines  Pooled Cohorts Equation Calculator  FRAX Risk Assessment  ICSI Preventive Guidelines  Dietary Guidelines for Americans, 2010  USDA's MyPlate  ASA Prophylaxis  Lung CA Screening    Vaibhav Seymour MD  [facility]  "

## 2021-07-28 ENCOUNTER — TELEPHONE (OUTPATIENT)
Dept: FAMILY MEDICINE | Facility: CLINIC | Age: 56
End: 2021-07-28

## 2021-07-28 NOTE — TELEPHONE ENCOUNTER
Pt has refill available for Remeron at [facility] pharmacy.     Called patient; left voice message letting him know that he has a refill of requested medication available at his pharmacy and that it's too early for another refill. Please [facility] with any questions [phone].    Ivett Ross RN

## 2021-07-29 ENCOUNTER — TELEPHONE (OUTPATIENT)
Dept: FAMILY MEDICINE | Facility: CLINIC | Age: 56
End: 2021-07-29

## 2021-07-29 DIAGNOSIS — F41.0 PANIC ANXIETY SYNDROME: ICD-10-CM

## 2021-07-29 RX ORDER — MIRTAZAPINE 7.5 MG/1
TABLET, FILM COATED ORAL
Qty: 60 TABLET | Refills: 9 | Status: SHIPPED | OUTPATIENT
Start: 2021-07-29 | End: 2021-11-11

## 2021-07-29 NOTE — TELEPHONE ENCOUNTER
Pt calling and does need a refill. He is trying to only take the medication as needed and has plenty.  Diane Coronado,

## 2021-10-24 ENCOUNTER — HEALTH MAINTENANCE LETTER (OUTPATIENT)
Age: 56
End: 2021-10-24

## 2021-11-09 ENCOUNTER — E-VISIT (OUTPATIENT)
Dept: FAMILY MEDICINE | Facility: CLINIC | Age: 56
End: 2021-11-09
Payer: COMMERCIAL

## 2021-11-09 DIAGNOSIS — I10 BENIGN ESSENTIAL HYPERTENSION: Primary | ICD-10-CM

## 2021-11-09 DIAGNOSIS — R42 DIZZINESS: ICD-10-CM

## 2021-11-09 PROCEDURE — 99421 OL DIG E/M SVC 5-10 MIN: CPT | Performed by: FAMILY MEDICINE

## 2021-11-11 ENCOUNTER — OFFICE VISIT (OUTPATIENT)
Dept: INTERNAL MEDICINE | Facility: CLINIC | Age: 56
End: 2021-11-11
Payer: COMMERCIAL

## 2021-11-11 VITALS
TEMPERATURE: 98.9 F | DIASTOLIC BLOOD PRESSURE: 78 MMHG | SYSTOLIC BLOOD PRESSURE: 135 MMHG | WEIGHT: 281.1 LBS | BODY MASS INDEX: 39.21 KG/M2

## 2021-11-11 DIAGNOSIS — F41.0 PANIC ANXIETY SYNDROME: ICD-10-CM

## 2021-11-11 DIAGNOSIS — E66.01 SEVERE OBESITY (BMI 35.0-35.9 WITH COMORBIDITY) (H): ICD-10-CM

## 2021-11-11 DIAGNOSIS — H81.12 BENIGN POSITIONAL VERTIGO, LEFT: Primary | ICD-10-CM

## 2021-11-11 DIAGNOSIS — F41.9 ANXIETY: ICD-10-CM

## 2021-11-11 DIAGNOSIS — Z23 NEED FOR SHINGLES VACCINE: ICD-10-CM

## 2021-11-11 DIAGNOSIS — E78.5 HYPERLIPIDEMIA LDL GOAL <160: ICD-10-CM

## 2021-11-11 DIAGNOSIS — F40.243 FEAR OF FLYING: ICD-10-CM

## 2021-11-11 DIAGNOSIS — I10 BENIGN ESSENTIAL HYPERTENSION: ICD-10-CM

## 2021-11-11 PROCEDURE — 90750 HZV VACC RECOMBINANT IM: CPT | Performed by: INTERNAL MEDICINE

## 2021-11-11 PROCEDURE — 99214 OFFICE O/P EST MOD 30 MIN: CPT | Mod: 25 | Performed by: INTERNAL MEDICINE

## 2021-11-11 PROCEDURE — 90471 IMMUNIZATION ADMIN: CPT | Performed by: INTERNAL MEDICINE

## 2021-11-11 RX ORDER — ALPRAZOLAM 0.5 MG
0.5 TABLET ORAL DAILY PRN
Qty: 5 TABLET | Refills: 0 | Status: SHIPPED | OUTPATIENT
Start: 2021-11-11 | End: 2023-09-08

## 2021-11-11 NOTE — PATIENT INSTRUCTIONS
"  *  Continue all medications at the same doses.  Contact your usual pharmacy if you need refills.       FEAR OF FLYING:     *   Ok to use Alprazolam (generic Xanax) 1/2 or 1 tablet shortly before air plane take off.   Do not drink alcohol after taking this, do not drive after taking this, do not use heavy machinery or perform dangerous tasks after taking due to the possible drowsiness.          BENIGN POSITIONAL VERTIGO (positional dizziness):     *  This is a common inner ear problem in patient over age 50, especially those with a history of hypertension and taking medications for blood pressures.     *  This is usually caused by a viral URI causing temporary inflammation in the inner ear balance apparatus (semicircular canals or labyrinths).  The is usually transient and will go away on its own after a couple of weeks.  There is no specific treatment for this.  Make sure that you move your head and changes positions slowly and deliberately.  Also be sure to maintain a good degree of hydration and adequate nutrition.      *  Meclizine (generic Antivert) 12.5 or 25 mg three times per day as needed to help reduce the frequency and intensity of these dizziness.  You can get this over the counter.  This will not treat a specific episode, it will hopefully help decrease the intensity and frequency of the episodes.  Beware of drowsiness.       *  Be sure to move your head and change body positions more slowly and deliberately as to help minimize the movement of the fluid in the inner ear and thereby reduce the dizziness.     *  Consider Epley maneuvers to help \"reposition\" the small crystal in the inner portion of the ear to each side as directed once a day.  Search YouTube on the internet for \"Epley Maneuver\"    Start going to left and back.  Hold each position for at least 30 seconds or until the dizziness goes away.  Stay/sleep more upright after doing this and in general.    The do the right and back.    Hold each " "position for at least 30 seconds or until the dizziness goes away.  Stay/sleep more upright after doing this and in general.    *  Call if new or worsening or persisting problems.        5 GOALS TO PREVENT VASCULAR DISEASE:     1.  Aggressive blood pressure control, under 130/80 ideally.  Using medications if needed.    Your blood pressure is under good control    BP Readings from Last 4 Encounters:   11/11/21 135/78   06/25/21 138/80   06/09/21 130/78   03/27/19 124/80       2.  Aggressive LDL cholesterol (\"bad cholesterol\") lowering as indicated.    Your goal is an LDL under 130 for sure, preferably under 100.  (If you have diabetes or previous vascular disease, the the LDL goals would be under 100 for sure, preferably under 70.)    New guidelines identify four high-risk groups who could benefit from statins:   *people with pre-existing heart disease, such as those who have had a heart attack;   *people ages 40 to 75 who have diabetes of any type  *patients ages 40 to 75 with at least a 7.5% risk of developing cardiovascular disease over the next decade, according to a formula described in the guidelines  *patients with the sort of super-high cholesterol that sometimes runs in families, as evidenced by an LDL of 190 milligrams per deciliter or higher    Your cholesterol levels are well controlled.    Recent Labs   Lab Test 06/25/21  0822 06/11/20  1403 03/03/16  0721 08/03/15  0756 07/18/15  0517   CHOL 174 161   < > 218* 227*   HDL 48 52   < > 36* 40*   * 89   < > 126 163*   TRIG 104 101   < > 279* 118   CHOLHDLRATIO  --   --   --  6.1* 5.7*    < > = values in this interval not displayed.       3.  Aggressive diabetic prevention, screening and/or management.      You do not have diabetes as of the most recent blood tests.     4.  No smoking    5.  Consider daily preventative aspirin over age 50 if you have enough cardiac risk factors to place you at higher risk for the presence of vascular disease.    If " you have any reason not to take aspirin such easy bruising or bleeding, stomach problems, other anticoagulant medications, or any other side effects, then you should not take Aspirin.     --Based on your current cardiac risk factor profile, you should take regular daily Aspirin 81 mg once per day (as long as you do not have any side effects from taking aspirin).

## 2021-11-11 NOTE — PROGRESS NOTES
Assessment & Plan     (H81.12) Benign positional vertigo, left  (primary encounter diagnosis)  Comment: This sounds most consistent with benign positional vertigo/vestibular neuronitis.   No evidence for more serious neurological problem.   Discussed issues around dizziness including benign positional vertigo and labyrinthitis.    Discussed basic principles of mgmt. including changing head position slowly, avoiding alcohol, trials of meclizine (or even valium if sx severe enough).  Sx. expected to be transient over next few days to weeks and should slowly resolve.    Consider vestibular rehabilitation referral if symptoms of vertigo linger.   If symptoms worsen or fail to improve or any new focal neurological symptoms develop, then further workup required.   I expect these symptoms to resolve fairly quickly.     Plan:     (I10) Benign essential hypertension  Comment: This condition is currently controlled on the current medical regimen.  Continue current therapy.   Plan:     (F41.0) Panic anxiety syndrome  Comment: occ for occ rare use of alprazloam for acvute panic or anxiety   frequent use means that you need something else mark regular basis to prevent anxiety from occurring,.   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   Plan:                   Return if symptoms worsen or fail to improve.    João Wills MD  [facility]    Luisa Fitzgerald is a 56 year old who presents for the following  health issues     HPI     Dizziness  Onset/Duration: 2 days ago   Description:   Do you feel faint: YES  Does it feel like the surroundings (bed, room) are moving: YES  Unsteady/off balance: YES  Have you passed out or fallen: YES  Intensity: 4/10  Progression of Symptoms: same  Accompanying Signs & Symptoms:  Heart palpitations or chest pain: no  Nausea, vomiting: no  Weakness or lack of coordination in arms or legs: no  Vision or speech changes: no  Numbness or tingling: no  Ringing in ears (Tinnitus): no  Hearing Loss: no  History:   Head trauma/concussion history: no  Previous similar symptoms: no  Recent bleeding history: no  Any new medications (BP?): no  Precipitating factors:   Worse with activity: YES  Worse with head movement: YES  Alleviating factors:   Does staying in a fixed position give relief: YES  Therapies tried and outcome: None      2.  history of fear of flying and occ mild panic while flying.   Has used alprazolam in the past.     3.    Hypertension:  History of hypertension, on medication.  No reported side effects from medications.    Reviewed last 6 BP readings in chart:  BP Readings from Last 6 Encounters:   11/11/21 135/78   06/25/21 138/80   06/09/21 130/78   03/27/19 124/80   01/21/19 118/68   01/08/19 126/86     No active cardiac complaints or symptoms with exercise.     4.  Hyperlipidemia:  Has history of hyperlipidemia.    The patient is taking a medication for this.  Denies any significant side effects from his medication.      Latest labs reviewed:    Recent Labs   Lab Test 06/25/21  0822 06/11/20  1403 03/03/16  0721 08/03/15  0756 07/18/15  0517   CHOL 174 161   < > 218* 227*   HDL 48 52   < > 36* 40*   * 89   < > 126 163*   TRIG 104 101   < > 279* 118   CHOLHDLRATIO  --   --   --  6.1* 5.7*    < > = values in this interval not displayed.        Lab Results   Component Value Date    AST 29 06/25/2021         5.    The patient has had a history of ongoing obesity.  Reviewed  the weigth curves.   Their current BMI is:  Body mass index is 39.21 kg/m .  Reviewed previous attempts at weight loss which have not been successful in producing prolonged weigth loss.   Discussed current eating and exercise habits.= over the past 2 years of the pandemic (I.e. the Covid 19)     Reviewed weights in chart:  Wt Readings from Last 10 Encounters:   11/11/21 127.5 kg (281 lb 1.6 oz)   06/25/21 120.7 kg (266 lb)   06/09/21 120.4 kg (265 lb 6.4 oz)   03/27/19 118.4 kg (261 lb)   01/21/19 116.1 kg (256 lb)   01/08/19 118 kg (260 lb 3.2 oz)   01/07/19 118.4 kg (261 lb)   12/14/18 123.8 kg (273 lb)   03/30/18 124.2 kg (273 lb 12.8 oz)   05/30/17 117.9 kg (260 lb)          **I reviewed the information recorded in the patient's EPIC chart (including but not limited to medical history, surgical history, family history, problem list, medication list, and allergy list) and updated the information as indicated based on the patients reported information.            Review of Systems   Constitutional, HEENT, cardiovascular, pulmonary, gi and gu systems are negative, except as otherwise noted.      Objective    /78   Temp 98.9  F (37.2  C) (Tympanic)   Wt 127.5 kg (281 lb 1.6 oz)   BMI 39.21 kg/m    Body mass index is 39.21 kg/m .  Physical Exam   GENERAL alert and no distress  EYES:  Normal sclera,conjunctiva, EOMI  HENT: oral and posterior pharynx without lesions or erythema, facies symmetric  NECK: Neck supple. No LAD, without thyroidmegaly.  RESP: Clear to ausculation bilaterally without wheezes or crackles. Normal BS in all fields.  CV: RRR normal S1S2 without murmurs, rubs or gallops.  LYMPH: no cervical lymph adenopathy appreciated  MS: extremities- no gross deformities of the visible extremities noted,   EXT:  no lower extremity edema  PSYCH: Alert and oriented times 3; speech- coherent  SKIN:  No obvious significant skin lesions on visible portions of face   NEURO:  CN II-XII intact and equal on both  sides.    Normal and symmetrical strength in both upper and lower extremities.   Coordination with FNF intact and normal on both sodes.   Romberg test negative.   Gait moving around the office and in the hallway when leaving was normal.  Heel-toe tightrope tandem gait walk was normal  Able to balance on each foot singly.   Shane Hallpike maneuvers were mildly positive withou nystagmus.

## 2021-11-21 PROBLEM — E66.01 SEVERE OBESITY (BMI 35.0-35.9 WITH COMORBIDITY) (H): Status: ACTIVE | Noted: 2018-12-17

## 2022-05-21 DIAGNOSIS — I10 BENIGN ESSENTIAL HYPERTENSION: ICD-10-CM

## 2022-05-21 DIAGNOSIS — E78.5 HYPERLIPIDEMIA LDL GOAL <160: ICD-10-CM

## 2022-05-23 RX ORDER — ATORVASTATIN CALCIUM 10 MG/1
TABLET, FILM COATED ORAL
Qty: 90 TABLET | Refills: 0 | Status: SHIPPED | OUTPATIENT
Start: 2022-05-23 | End: 2022-09-01

## 2022-05-23 RX ORDER — LISINOPRIL/HYDROCHLOROTHIAZIDE 10-12.5 MG
TABLET ORAL
Qty: 90 TABLET | Refills: 0 | Status: SHIPPED | OUTPATIENT
Start: 2022-05-23 | End: 2022-09-01

## 2022-05-23 NOTE — TELEPHONE ENCOUNTER
Medication is being filled for 1 time refill only due to:  Patient needs to be seen because due for physical after 6/25/22.   Flower DUNHAM RN  [facility]

## 2022-07-06 ASSESSMENT — ENCOUNTER SYMPTOMS
ARTHRALGIAS: 0
HEMATURIA: 0
NERVOUS/ANXIOUS: 1
HEMATOCHEZIA: 0
HEARTBURN: 0
PARESTHESIAS: 0
WEAKNESS: 0
EYE PAIN: 0
NAUSEA: 0
DIARRHEA: 1
DYSURIA: 0
CHILLS: 0
SORE THROAT: 0
ABDOMINAL PAIN: 0
JOINT SWELLING: 0
COUGH: 0
DIZZINESS: 0
MYALGIAS: 0
FREQUENCY: 0
HEADACHES: 0
FEVER: 0
CONSTIPATION: 1
PALPITATIONS: 0
SHORTNESS OF BREATH: 0

## 2022-07-13 ENCOUNTER — OFFICE VISIT (OUTPATIENT)
Dept: FAMILY MEDICINE | Facility: CLINIC | Age: 57
End: 2022-07-13
Payer: COMMERCIAL

## 2022-07-13 VITALS
HEIGHT: 72 IN | OXYGEN SATURATION: 99 % | DIASTOLIC BLOOD PRESSURE: 86 MMHG | BODY MASS INDEX: 37.52 KG/M2 | TEMPERATURE: 96.7 F | WEIGHT: 277 LBS | HEART RATE: 78 BPM | SYSTOLIC BLOOD PRESSURE: 136 MMHG

## 2022-07-13 DIAGNOSIS — R73.9 ELEVATED BLOOD SUGAR: ICD-10-CM

## 2022-07-13 DIAGNOSIS — Z12.5 SCREENING FOR PROSTATE CANCER: ICD-10-CM

## 2022-07-13 DIAGNOSIS — F41.1 GAD (GENERALIZED ANXIETY DISORDER): ICD-10-CM

## 2022-07-13 DIAGNOSIS — E78.5 HYPERLIPIDEMIA LDL GOAL <160: ICD-10-CM

## 2022-07-13 DIAGNOSIS — E66.01 SEVERE OBESITY (BMI 35.0-35.9 WITH COMORBIDITY) (H): ICD-10-CM

## 2022-07-13 DIAGNOSIS — I10 BENIGN ESSENTIAL HYPERTENSION: Primary | ICD-10-CM

## 2022-07-13 DIAGNOSIS — Z00.00 ENCOUNTER FOR ANNUAL PHYSICAL EXAM: ICD-10-CM

## 2022-07-13 PROCEDURE — 99214 OFFICE O/P EST MOD 30 MIN: CPT | Mod: 25 | Performed by: FAMILY MEDICINE

## 2022-07-13 PROCEDURE — 80061 LIPID PANEL: CPT | Performed by: FAMILY MEDICINE

## 2022-07-13 PROCEDURE — 99396 PREV VISIT EST AGE 40-64: CPT | Performed by: FAMILY MEDICINE

## 2022-07-13 PROCEDURE — G0103 PSA SCREENING: HCPCS | Performed by: FAMILY MEDICINE

## 2022-07-13 PROCEDURE — 80053 COMPREHEN METABOLIC PANEL: CPT | Performed by: FAMILY MEDICINE

## 2022-07-13 PROCEDURE — 36415 COLL VENOUS BLD VENIPUNCTURE: CPT | Performed by: FAMILY MEDICINE

## 2022-07-13 ASSESSMENT — ENCOUNTER SYMPTOMS
SORE THROAT: 0
JOINT SWELLING: 0
DIARRHEA: 1
NERVOUS/ANXIOUS: 1
HEMATOCHEZIA: 0
PALPITATIONS: 0
ABDOMINAL PAIN: 0
WEAKNESS: 0
COUGH: 0
HEMATURIA: 0
FREQUENCY: 0
HEADACHES: 0
DYSURIA: 0
MYALGIAS: 0
SHORTNESS OF BREATH: 0
CONSTIPATION: 1
PARESTHESIAS: 0
DIZZINESS: 0
EYE PAIN: 0
HEARTBURN: 0
NAUSEA: 0
CHILLS: 0
FEVER: 0
ARTHRALGIAS: 0

## 2022-07-13 ASSESSMENT — PAIN SCALES - GENERAL: PAINLEVEL: NO PAIN (0)

## 2022-07-13 NOTE — PROGRESS NOTES
SUBJECTIVE:   CC: Amilcar Yang is an 57 year old male who presents for preventative health visit.     Patient has been advised of split billing requirements and indicates understanding: Yes  Healthy Habits:     Getting at least 3 servings of Calcium per day:  Yes    Bi-annual eye exam:  Yes    Dental care twice a year:  Yes    Sleep apnea or symptoms of sleep apnea:  Daytime drowsiness    Diet:  Regular (no restrictions)    Frequency of exercise:  4-5 days/week    Duration of exercise:  45-60 minutes    Taking medications regularly:  Yes    Medication side effects:  None    PHQ-2 Total Score: 2    Additional concerns today:  Yes    Patient is overall healthy however he has some complains of underlying anxiety including sleep issues.  Also complains of some GI symptoms including constipation.  Sometimes he feels he can go multiple times with small stools.  Denies any weight loss or any other symptoms.  Sleep is somewhat better with melatonin but he still feels not getting enough sleep despite getting 7 hours of sleep.  Denies any snoring.  Currently taking lisinopril hydrochlorothiazide combination for blood pressure and Lipitor for his cholesterol.  Uses Xanax very sparingly and has not used that lately.  He is wondering about his underlying anxiety if there is any long-term medication which can be done for that purpose.      Today's PHQ-2 Score:   PHQ-2 ( 1999 Pfizer) 7/6/2022   Q1: Little interest or pleasure in doing things 1   Q2: Feeling down, depressed or hopeless 1   PHQ-2 Score 2   PHQ-2 Total Score (12-17 Years)- Positive if 3 or more points; Administer PHQ-A if positive -   Q1: Little interest or pleasure in doing things Several days   Q2: Feeling down, depressed or hopeless Several days   PHQ-2 Score 2       Abuse: Current or Past(Physical, Sexual or Emotional)- No  Do you feel safe in your environment? Yes        Social History     Tobacco Use     Smoking status: Former Smoker     Quit date: 7/17/1985  "    Years since quittin.0     Smokeless tobacco: Never Used   Substance Use Topics     Alcohol use: Yes         Alcohol Use 2022   Prescreen: >3 drinks/day or >7 drinks/week? No     Last PSA:   PSA   Date Value Ref Range Status   2021 0.28 0 - 4 ug/L Final     Comment:     Assay Method:  Chemiluminescence using Siemens Vista analyzer       Reviewed orders with patient. Reviewed health maintenance and updated orders accordingly - Yes  Lab work is in process    Reviewed and updated as needed this visit by clinical staff   Tobacco  Allergies  Meds                Reviewed and updated as needed this visit by Provider                       Review of Systems   Constitutional: Negative for chills and fever.   HENT: Negative for congestion, ear pain, hearing loss and sore throat.    Eyes: Negative for pain and visual disturbance.   Respiratory: Negative for cough and shortness of breath.    Cardiovascular: Negative for chest pain, palpitations and peripheral edema.   Gastrointestinal: Positive for constipation and diarrhea. Negative for abdominal pain, heartburn, hematochezia and nausea.   Genitourinary: Negative for dysuria, frequency, genital sores, hematuria, impotence, penile discharge and urgency.   Musculoskeletal: Negative for arthralgias, joint swelling and myalgias.   Skin: Negative for rash.   Neurological: Negative for dizziness, weakness, headaches and paresthesias.   Psychiatric/Behavioral: Negative for mood changes. The patient is nervous/anxious.        OBJECTIVE:   BP (!) 143/88   Pulse 78   Temp (!) 96.7  F (35.9  C) (Temporal)   Ht 1.816 m (5' 11.5\")   Wt 125.6 kg (277 lb)   SpO2 99%   BMI 38.10 kg/m      Physical Exam  GENERAL: healthy, alert and no distress  EYES: Eyes grossly normal to inspection, PERRL and conjunctivae and sclerae normal  HENT: ear canals and TM's normal, nose and mouth without ulcers or lesions  NECK: no adenopathy, no asymmetry, masses, or scars and thyroid " normal to palpation  RESP: lungs clear to auscultation - no rales, rhonchi or wheezes  CV: regular rate and rhythm, normal S1 S2, no S3 or S4, no murmur, click or rub, no peripheral edema and peripheral pulses strong  ABDOMEN: soft, nontender, no hepatosplenomegaly, no masses and bowel sounds normal  MS: no gross musculoskeletal defects noted, no edema  SKIN: no suspicious lesions or rashes  NEURO: Normal strength and tone, mentation intact and speech normal  PSYCH: mentation appears normal and anxious        ASSESSMENT/PLAN:   Amilcar was seen today for physical.    Diagnoses and all orders for this visit:    Benign essential hypertension  -     Comprehensive metabolic panel; Future  -     Comprehensive metabolic panel  We will refill medication once labs reviewed.  Encounter for annual physical exam  -     Comprehensive metabolic panel; Future  -     Lipid Profile; Future  -     PSA, screen; Future  -     Comprehensive metabolic panel  -     Lipid Profile  -     PSA, screen  Complains of some constipation symptoms we discussed different regimens including MiraLAX and docusate sodium.  Also advised to use fiber supplement avoid excessive meat intake.  Severe obesity (BMI 35.0-35.9 with comorbidity) (H)    Hyperlipidemia LDL goal <160  -     Comprehensive metabolic panel; Future  -     Lipid Profile; Future  -     Comprehensive metabolic panel  -     Lipid Profile  Refill medication once labs reviewed.  Screening for prostate cancer  -     PSA, screen; Future  -     PSA, screen    NATALIE (generalized anxiety disorder)  -     sertraline (ZOLOFT) 50 MG tablet; Take 1 tablet (50 mg) by mouth daily  Patient has few underlying symptoms including anxiety.  However this has been manifesting as different symptoms including sleep not able to function some time.  We discussed about medication including Zoloft.  He should try that medication and see if that helps long-term he will let us know in 6 weeks if there is any improvement.  " If there is any severe side effects and no improvement he will also let us know before stopping the medication.  Other orders  -     REVIEW OF HEALTH MAINTENANCE PROTOCOL ORDERS        Patient has been advised of split billing requirements and indicates understanding: Yes    COUNSELING:   Reviewed preventive health counseling, as reflected in patient instructions       Regular exercise       Healthy diet/nutrition    Estimated body mass index is 38.1 kg/m  as calculated from the following:    Height as of this encounter: 1.816 m (5' 11.5\").    Weight as of this encounter: 125.6 kg (277 lb).         He reports that he quit smoking about 37 years ago. He has never used smokeless tobacco.      Counseling Resources:  ATP IV Guidelines  Pooled Cohorts Equation Calculator  FRAX Risk Assessment  ICSI Preventive Guidelines  Dietary Guidelines for Americans, 2010  USDA's MyPlate  ASA Prophylaxis  Lung CA Screening    Vaibhav Seymour MD  [facility]  "

## 2022-07-14 LAB
ALBUMIN SERPL-MCNC: 3.8 G/DL (ref 3.4–5)
ALP SERPL-CCNC: 104 U/L (ref 40–150)
ALT SERPL W P-5'-P-CCNC: 49 U/L (ref 0–70)
ANION GAP SERPL CALCULATED.3IONS-SCNC: 7 MMOL/L (ref 3–14)
AST SERPL W P-5'-P-CCNC: 23 U/L (ref 0–45)
BILIRUB SERPL-MCNC: 0.7 MG/DL (ref 0.2–1.3)
BUN SERPL-MCNC: 12 MG/DL (ref 7–30)
CALCIUM SERPL-MCNC: 9.3 MG/DL (ref 8.5–10.1)
CHLORIDE BLD-SCNC: 104 MMOL/L (ref 94–109)
CHOLEST SERPL-MCNC: 187 MG/DL
CO2 SERPL-SCNC: 28 MMOL/L (ref 20–32)
CREAT SERPL-MCNC: 1.06 MG/DL (ref 0.66–1.25)
FASTING STATUS PATIENT QL REPORTED: YES
GFR SERPL CREATININE-BSD FRML MDRD: 82 ML/MIN/1.73M2
GLUCOSE BLD-MCNC: 135 MG/DL (ref 70–99)
HDLC SERPL-MCNC: 48 MG/DL
LDLC SERPL CALC-MCNC: 101 MG/DL
NONHDLC SERPL-MCNC: 139 MG/DL
POTASSIUM BLD-SCNC: 3.9 MMOL/L (ref 3.4–5.3)
PROT SERPL-MCNC: 7.3 G/DL (ref 6.8–8.8)
PSA SERPL-MCNC: 0.3 UG/L (ref 0–4)
SODIUM SERPL-SCNC: 139 MMOL/L (ref 133–144)
TRIGL SERPL-MCNC: 192 MG/DL

## 2022-07-18 ENCOUNTER — TELEPHONE (OUTPATIENT)
Dept: FAMILY MEDICINE | Facility: CLINIC | Age: 57
End: 2022-07-18

## 2022-07-18 NOTE — TELEPHONE ENCOUNTER
----- Message from Vaibhav Seymour MD sent at 7/18/2022 10:56 AM CDT -----      I have reviewed your recent labs. Here are the results:    -PSA (prostate specific antigen) test is normal.  This indicates a low likelihood of prostate cancer.  ADVISE: rechecking this in 1 year.    Liver and kidney functions are normal. Blood sugar is elevated, I have ordered a lab test to confirm if you have underline diabetes. This test is called HBAIC. Please call and set up some time to get it it done. This will help me to determine if you have underline diabetes.  You can call at [phone] to schedule this lab    -LDL(bad) cholesterol level is elevated, and your triglycerides are elevated which can increase your heart disease risk.  A diet high in fat and simple carbohydrates, genetics and being overweight can contribute to this. ADVISE: exercising 150 minutes of aerobic exercise per week (30 minutes for 5 days per week or 50 minutes for 3 days per week are options), eating a low saturated fat/low carbohydrate diet, and omega-3 fatty acids (fish oil) 6737-3135 mg daily are helpful to improve this. In 6 months, you should recheck your fasting cholesterol panel by scheduling a lab-only appointment.      Vaibhav Seymour MD  7/18/2022

## 2022-08-04 DIAGNOSIS — F41.1 GAD (GENERALIZED ANXIETY DISORDER): ICD-10-CM

## 2022-08-04 NOTE — TELEPHONE ENCOUNTER
Prescription approved per [facility] Refill Protocol.  Marci Harry RN  [facility] Triage Team

## 2022-08-16 ENCOUNTER — MYC REFILL (OUTPATIENT)
Dept: FAMILY MEDICINE | Facility: CLINIC | Age: 57
End: 2022-08-16

## 2022-08-16 DIAGNOSIS — F41.1 GAD (GENERALIZED ANXIETY DISORDER): ICD-10-CM

## 2022-08-18 NOTE — TELEPHONE ENCOUNTER
Prescription approved per [facility] Refill Protocol.    Marci Harry RN  [facility] Triage Team

## 2022-08-31 DIAGNOSIS — E78.5 HYPERLIPIDEMIA LDL GOAL <160: ICD-10-CM

## 2022-08-31 DIAGNOSIS — I10 BENIGN ESSENTIAL HYPERTENSION: ICD-10-CM

## 2022-09-01 RX ORDER — LISINOPRIL/HYDROCHLOROTHIAZIDE 10-12.5 MG
TABLET ORAL
Qty: 90 TABLET | Refills: 3 | Status: SHIPPED | OUTPATIENT
Start: 2022-09-01 | End: 2023-09-08

## 2022-09-01 RX ORDER — ATORVASTATIN CALCIUM 10 MG/1
TABLET, FILM COATED ORAL
Qty: 90 TABLET | Refills: 3 | Status: SHIPPED | OUTPATIENT
Start: 2022-09-01 | End: 2023-09-08

## 2022-10-16 ENCOUNTER — HEALTH MAINTENANCE LETTER (OUTPATIENT)
Age: 57
End: 2022-10-16

## 2022-12-19 ENCOUNTER — MYC MEDICAL ADVICE (OUTPATIENT)
Dept: FAMILY MEDICINE | Facility: CLINIC | Age: 57
End: 2022-12-19

## 2022-12-20 NOTE — TELEPHONE ENCOUNTER
Please notify the patient we cannot provide letter stating he needs to stay home for due to his anxiety however we can only provide he has underlying history of anxiety if that helps.  Working from office versus home is not our jurisdiction  I would also like to see him back in the clinic so that we can assess it better.

## 2022-12-20 NOTE — TELEPHONE ENCOUNTER
Please see Piqorahart message and advise.     Patient is requesting a note for his work.    Marci Harry RN  [facility] Triage Team

## 2022-12-20 NOTE — TELEPHONE ENCOUNTER
Patient notified of provider's response via MeetCutehart.    Marci Harry RN  [facility] Triage Team

## 2022-12-21 NOTE — TELEPHONE ENCOUNTER
As earlier noted I have told patient to follow-up okay to give him some virtual visit we can discuss and see if it is appropriate but again we can only write if he has underlying anxiety wwe cannot provide any location which make it better or worse. That is beyond my medical judgment,

## 2023-03-01 ENCOUNTER — TRANSFERRED RECORDS (OUTPATIENT)
Dept: MULTI SPECIALTY CLINIC | Facility: CLINIC | Age: 58
End: 2023-03-01

## 2023-03-01 LAB — RETINOPATHY: NORMAL

## 2023-04-24 ENCOUNTER — TELEPHONE (OUTPATIENT)
Dept: FAMILY MEDICINE | Facility: CLINIC | Age: 58
End: 2023-04-24

## 2023-04-24 ENCOUNTER — OFFICE VISIT (OUTPATIENT)
Dept: INTERNAL MEDICINE | Facility: CLINIC | Age: 58
End: 2023-04-24
Payer: COMMERCIAL

## 2023-04-24 ENCOUNTER — NURSE TRIAGE (OUTPATIENT)
Dept: FAMILY MEDICINE | Facility: CLINIC | Age: 58
End: 2023-04-24

## 2023-04-24 VITALS
DIASTOLIC BLOOD PRESSURE: 78 MMHG | BODY MASS INDEX: 35.26 KG/M2 | WEIGHT: 260.3 LBS | OXYGEN SATURATION: 97 % | HEART RATE: 102 BPM | RESPIRATION RATE: 16 BRPM | SYSTOLIC BLOOD PRESSURE: 122 MMHG | HEIGHT: 72 IN

## 2023-04-24 DIAGNOSIS — R63.4 WEIGHT LOSS: Primary | ICD-10-CM

## 2023-04-24 DIAGNOSIS — R53.83 OTHER FATIGUE: ICD-10-CM

## 2023-04-24 DIAGNOSIS — R63.1 EXCESSIVE THIRST: ICD-10-CM

## 2023-04-24 LAB
ALBUMIN SERPL BCG-MCNC: 4.6 G/DL (ref 3.5–5.2)
ALP SERPL-CCNC: 214 U/L (ref 40–129)
ALT SERPL W P-5'-P-CCNC: 38 U/L (ref 10–50)
ANION GAP SERPL CALCULATED.3IONS-SCNC: 18 MMOL/L (ref 7–15)
AST SERPL W P-5'-P-CCNC: 20 U/L (ref 10–50)
BASOPHILS # BLD AUTO: 0 10E3/UL (ref 0–0.2)
BASOPHILS NFR BLD AUTO: 1 %
BILIRUB SERPL-MCNC: 0.5 MG/DL
BUN SERPL-MCNC: 16.1 MG/DL (ref 6–20)
CALCIUM SERPL-MCNC: 10.4 MG/DL (ref 8.6–10)
CHLORIDE SERPL-SCNC: 84 MMOL/L (ref 98–107)
CREAT SERPL-MCNC: 0.97 MG/DL (ref 0.67–1.17)
DEPRECATED HCO3 PLAS-SCNC: 23 MMOL/L (ref 22–29)
EOSINOPHIL # BLD AUTO: 0.3 10E3/UL (ref 0–0.7)
EOSINOPHIL NFR BLD AUTO: 4 %
ERYTHROCYTE [DISTWIDTH] IN BLOOD BY AUTOMATED COUNT: 12.2 % (ref 10–15)
GFR SERPL CREATININE-BSD FRML MDRD: >90 ML/MIN/1.73M2
GLUCOSE SERPL-MCNC: 730 MG/DL (ref 70–99)
HBA1C MFR BLD: 12.3 % (ref 0–5.6)
HCT VFR BLD AUTO: 46.4 % (ref 40–53)
HGB BLD-MCNC: 16 G/DL (ref 13.3–17.7)
LYMPHOCYTES # BLD AUTO: 2.2 10E3/UL (ref 0.8–5.3)
LYMPHOCYTES NFR BLD AUTO: 27 %
MCH RBC QN AUTO: 30 PG (ref 26.5–33)
MCHC RBC AUTO-ENTMCNC: 34.5 G/DL (ref 31.5–36.5)
MCV RBC AUTO: 87 FL (ref 78–100)
MONOCYTES # BLD AUTO: 0.5 10E3/UL (ref 0–1.3)
MONOCYTES NFR BLD AUTO: 7 %
NEUTROPHILS # BLD AUTO: 5.3 10E3/UL (ref 1.6–8.3)
NEUTROPHILS NFR BLD AUTO: 63 %
PLATELET # BLD AUTO: 264 10E3/UL (ref 150–450)
POTASSIUM SERPL-SCNC: 3.9 MMOL/L (ref 3.4–5.3)
PROT SERPL-MCNC: 7.8 G/DL (ref 6.4–8.3)
RBC # BLD AUTO: 5.33 10E6/UL (ref 4.4–5.9)
SODIUM SERPL-SCNC: 125 MMOL/L (ref 136–145)
WBC # BLD AUTO: 8.3 10E3/UL (ref 4–11)

## 2023-04-24 PROCEDURE — 99214 OFFICE O/P EST MOD 30 MIN: CPT | Performed by: PHYSICIAN ASSISTANT

## 2023-04-24 PROCEDURE — 80053 COMPREHEN METABOLIC PANEL: CPT | Performed by: PHYSICIAN ASSISTANT

## 2023-04-24 PROCEDURE — 83036 HEMOGLOBIN GLYCOSYLATED A1C: CPT | Performed by: PHYSICIAN ASSISTANT

## 2023-04-24 PROCEDURE — 36415 COLL VENOUS BLD VENIPUNCTURE: CPT | Performed by: PHYSICIAN ASSISTANT

## 2023-04-24 PROCEDURE — 85025 COMPLETE CBC W/AUTO DIFF WBC: CPT | Performed by: PHYSICIAN ASSISTANT

## 2023-04-24 NOTE — TELEPHONE ENCOUNTER
Provider Response to 2nd Level Triage Request    I have reviewed the RN documentation. My recommendation is:  Face To Face Visit. already scheduled for today

## 2023-04-24 NOTE — TELEPHONE ENCOUNTER
"Nurse Triage SBAR    Is this a 2nd Level Triage? YES, LICENSED PRACTITIONER REVIEW IS REQUIRED    Situation: Pt called the clinic stating he has an appt scheduled on 5/18 but can't wait that long with the symptoms he is having.     Background: Pts symptoms started about 2 wks ago. Pt has seen an eye dr recently.     Assessment: Dry mouth, drinking frequently and urinating frequently. Gets up during the middle of the night to urinate.       Vision is still kind of blurry even with new glasses. Pt saw eye dr about a month ago.    Drinking liquids and it doesn't make a difference (still has dry mouth).      When wakes up during the night- has to be careful with his footing     Been getting more slight headaches.     Protocol Recommended Disposition:   See in Office Today. Scheduled pt with provider for appt today at . Pt agrees to plan.     Routing as an FYI.     Routed to provider    Does the patient meet one of the following criteria for ADS visit consideration? 16+ years old, with an [facility] PCP     TIP  Providers, please consider if this condition is appropriate for management at one of our Acute and Diagnostic Services sites.     If patient is a good candidate, please use dotphrase <dot>triageresponse and select Refer to ADS to document.        1. SYMPTOM: \"What's the main symptom you're concerned about?\" (e.g., frequency, incontinence)      Frequency   2. ONSET: \"When did the  symptoms  start?\"      2 wks  3. PAIN: \"Is there any pain?\" If Yes, ask: \"How bad is it?\" (Scale: 1-10; mild, moderate, severe)      No  4. CAUSE: \"What do you think is causing the symptoms?\"      Unknown   5. OTHER SYMPTOMS: \"Do you have any other symptoms?\" (e.g., fever, flank pain, blood in urine, pain with urination)      None  6. PREGNANCY: \"Is there any chance you are pregnant?\" \"When was your last menstrual period?\"      NA    Reason for Disposition    Urinating more frequently than usual (i.e., frequency)    Additional " Information    Negative: Shock suspected (e.g., cold/pale/clammy skin, too weak to stand, low BP, rapid pulse)    Negative: Sounds like a life-threatening emergency to the triager    Negative: Followed a female genital area injury (e.g., vagina, vulva)    Negative: Followed a male genital area injury (penis, scrotum)    Negative: Vaginal discharge    Negative: Pus (white, yellow) or bloody discharge from end of penis    Negative: Pain or burning with passing urine (urination) and pregnant    Negative: Pain or burning with passing urine (urination) and female    Negative: Pain or burning with passing urine (urination) and male    Negative: Pain or itching in the vulvar area    Negative: Pain in scrotum is main symptom    Negative: Blood in the urine is main symptom    Negative: Symptoms arising from use of a urinary catheter (e.g., coude, Gonzalez)    Negative: Unable to urinate (or only a few drops) > 4 hours and bladder feels very full (e.g., palpable bladder or strong urge to urinate)    Negative: Decreased urination and drinking very little and dehydration suspected (e.g., dark urine, no urine > 12 hours, very dry mouth, very lightheaded)    Negative: Patient sounds very sick or weak to the triager    Negative: Fever > 100.4 F  (38.0 C)    Negative: Side (flank) or lower back pain present    Negative: Can't control passage of urine (i.e., urinary incontinence) and new-onset (< 2 weeks) or worsening    Protocols used: URINARY SYMPTOMS-A-[place]

## 2023-04-24 NOTE — PROGRESS NOTES
"  Assessment & Plan     Weight loss  new    - Comprehensive metabolic panel (BMP + Alb, Alk Phos, ALT, AST, Total. Bili, TP)  - Hemoglobin A1c  - CBC with platelets and differential    Other fatigue    new  - Comprehensive metabolic panel (BMP + Alb, Alk Phos, ALT, AST, Total. Bili, TP)  - Hemoglobin A1c  - CBC with platelets and differential    Excessive thirst    - Comprehensive metabolic panel (BMP + Alb, Alk Phos, ALT, AST, Total. Bili, TP)  - Hemoglobin A1c  - CBC with platelets and differential             BMI:   Estimated body mass index is 35.8 kg/m  as calculated from the following:    Height as of this encounter: 1.816 m (5' 11.5\").    Weight as of this encounter: 118.1 kg (260 lb 4.8 oz).           Marlene Romero PA-C  [facility]    Luisa Fitzgerald is a 57 year old, presenting for the following health issues:  Consult      History of Present Illness       Reason for visit:  Dryness of mouth  Symptom onset:  1-2 weeks ago  Symptoms include:  Dry mouth, excessive urinating, tired  Symptom intensity:  Moderate  Symptom progression:  Worsening  Had these symptoms before:  No    He eats 0-1 servings of fruits and vegetables daily.He consumes 3 sweetened beverage(s) daily.He exercises with enough effort to increase his heart rate 30 to 60 minutes per day.  He exercises with enough effort to increase his heart rate 4 days per week.   He is taking medications regularly.     Family hx of DM in mother.                 Review of Systems   Constitutional, HEENT, cardiovascular, pulmonary, gi and gu systems are negative, except as otherwise noted.      Objective    /78   Pulse 102   Resp 16   Ht 1.816 m (5' 11.5\")   Wt 118.1 kg (260 lb 4.8 oz)   SpO2 97%   BMI 35.80 kg/m    Body mass index is 35.8 kg/m .  Physical Exam   GENERAL: healthy, alert and no distress  RESP: lungs clear to auscultation - no rales, rhonchi or wheezes  CV: regular rates and rhythm and " normal S1 S2, no S3 or S4  ABDOMEN: soft, nontender, no hepatosplenomegaly, no masses and bowel sounds normal  MS: no gross musculoskeletal defects noted, no edema  SKIN: no suspicious lesions or rashes

## 2023-04-25 ENCOUNTER — OFFICE VISIT (OUTPATIENT)
Dept: INTERNAL MEDICINE | Facility: CLINIC | Age: 58
End: 2023-04-25
Payer: COMMERCIAL

## 2023-04-25 ENCOUNTER — TELEPHONE (OUTPATIENT)
Dept: INTERNAL MEDICINE | Facility: CLINIC | Age: 58
End: 2023-04-25

## 2023-04-25 VITALS
RESPIRATION RATE: 16 BRPM | HEART RATE: 102 BPM | DIASTOLIC BLOOD PRESSURE: 80 MMHG | HEIGHT: 72 IN | SYSTOLIC BLOOD PRESSURE: 130 MMHG | OXYGEN SATURATION: 97 % | BODY MASS INDEX: 35.21 KG/M2 | TEMPERATURE: 97.7 F | WEIGHT: 260 LBS

## 2023-04-25 DIAGNOSIS — E11.9 NEW ONSET TYPE 2 DIABETES MELLITUS (H): Primary | ICD-10-CM

## 2023-04-25 DIAGNOSIS — E11.65 UNCONTROLLED TYPE 2 DIABETES MELLITUS WITH HYPERGLYCEMIA (H): ICD-10-CM

## 2023-04-25 PROCEDURE — 99213 OFFICE O/P EST LOW 20 MIN: CPT | Performed by: PHYSICIAN ASSISTANT

## 2023-04-25 RX ORDER — GLUCOSAMINE HCL/CHONDROITIN SU 500-400 MG
CAPSULE ORAL
Qty: 100 EACH | Refills: 3 | Status: SHIPPED | OUTPATIENT
Start: 2023-04-25 | End: 2023-05-18

## 2023-04-25 RX ORDER — ALBUTEROL SULFATE 108 UG/1
AEROSOL, METERED RESPIRATORY (INHALATION)
Qty: 100 EACH | Refills: 3 | Status: SHIPPED | OUTPATIENT
Start: 2023-04-25

## 2023-04-25 RX ORDER — METFORMIN HCL 500 MG
500 TABLET, EXTENDED RELEASE 24 HR ORAL
Qty: 30 TABLET | Refills: 1 | Status: SHIPPED | OUTPATIENT
Start: 2023-04-25 | End: 2023-05-09

## 2023-04-25 RX ORDER — INSULIN GLARGINE 100 [IU]/ML
10 INJECTION, SOLUTION SUBCUTANEOUS AT BEDTIME
Qty: 15 ML | Refills: 3 | Status: SHIPPED | OUTPATIENT
Start: 2023-04-25 | End: 2023-05-09

## 2023-04-25 RX ORDER — LANCETS
EACH MISCELLANEOUS
Qty: 100 EACH | Refills: 1 | Status: SHIPPED | OUTPATIENT
Start: 2023-04-25 | End: 2023-10-26

## 2023-04-25 RX ORDER — GLUCOSAMINE HCL/CHONDROITIN SU 500-400 MG
CAPSULE ORAL
Qty: 100 EACH | Refills: 3 | Status: SHIPPED | OUTPATIENT
Start: 2023-04-25 | End: 2023-12-30

## 2023-04-25 NOTE — TELEPHONE ENCOUNTER
PRIOR AUTHORIZATION DENIED    Medication: insulin pen needle ([employer]) 29G X 12.7MM miscellaneous - EPA DENIED    Denial Date: 4/25/2023    Denial Rational:       Appeal Information:

## 2023-04-25 NOTE — TELEPHONE ENCOUNTER
Noted    Please schedule in my 130 hold or take one of the virtuals today and I will do a video visit with the patient.

## 2023-04-25 NOTE — CONFIDENTIAL NOTE
I got a call from the lab that his blood sugar is 731  I called the patient  Spoke to him  His A1c is greater than 12%  This is a new diagnosis for him  I recommended going to the ER because of 731 blood sugar  Discussed with him that he would need insulin and IV fluids  He says he has the symptoms for long time  He is not having any acute symptoms  He would wait and see the provider  tomorrow in the clinic  He said the provider who saw him today told him that if anything comes out of ordinary then they will seer him tomorrow  Discussed the importance of contacting the provider tomorrow  I will carbon copy this note to his provider who saw him today  Meanwhile if anything changes he should go to the emergency room    I am also routing to our triage nurses to make sure he gets seen tomorrow    Dr.Nasima Ervin MD

## 2023-04-25 NOTE — PROGRESS NOTES
Assessment & Plan     New onset type 2 diabetes mellitus (H)    - [facility] Adult Diabetes Educator Referral; Future  - insulin glargine (LANTUS SOLOSTAR) 100 UNIT/ML pen; Inject 10 Units Subcutaneous At Bedtime  - insulin pen needle (ULTICARE) 29G X 12.7MM miscellaneous; Use one pen needles daily or as directed.  - alcohol swab prep pads; Use to swab area of injection/corie as directed.  - blood glucose monitoring (NO BRAND SPECIFIED) meter device kit; Use to test blood sugar one times daily or as directed. Preferred blood glucose meter OR supplies to accompany: Blood Glucose Monitor Brands: per insurance.  - blood glucose (NO BRAND SPECIFIED) test strip; Use to test blood sugar once times daily or as directed. To accompany: Blood Glucose Monitor Brands: per insurance.  - blood glucose calibration (NO BRAND SPECIFIED) solution; To accompany: Blood Glucose Monitor Brands: per insurance.  - thin (NO BRAND SPECIFIED) lancets; Use with lanceting device. To accompany: Blood Glucose Monitor Brands: per insurance.  - alcohol swab prep pads; Use to swab area of injection/corie as directed.  - metFORMIN (GLUCOPHAGE XR) 500 MG 24 hr tablet; Take 1 tablet (500 mg) by mouth daily (with dinner)    Uncontrolled type 2 diabetes mellitus with hyperglycemia (H)    - [facility] Adult Diabetes Educator Referral; Future  - insulin glargine (LANTUS SOLOSTAR) 100 UNIT/ML pen; Inject 10 Units Subcutaneous At Bedtime  - insulin pen needle (ULTICARE) 29G X 12.7MM miscellaneous; Use one pen needles daily or as directed.  - alcohol swab prep pads; Use to swab area of injection/corie as directed.  - blood glucose monitoring (NO BRAND SPECIFIED) meter device kit; Use to test blood sugar one times daily or as directed. Preferred blood glucose meter OR supplies to accompany: Blood Glucose Monitor Brands: per insurance.  - blood glucose (NO BRAND SPECIFIED) test strip; Use to test blood sugar once times daily or as directed. To accompany: Blood Glucose  "Monitor Brands: per insurance.  - blood glucose calibration (NO BRAND SPECIFIED) solution; To accompany: Blood Glucose Monitor Brands: per insurance.  - thin (NO BRAND SPECIFIED) lancets; Use with lanceting device. To accompany: Blood Glucose Monitor Brands: per insurance.  - alcohol swab prep pads; Use to swab area of injection/corie as directed.  - metFORMIN (GLUCOPHAGE XR) 500 MG 24 hr tablet; Take 1 tablet (500 mg) by mouth daily (with dinner)             BMI:   Estimated body mass index is 35.76 kg/m  as calculated from the following:    Height as of this encounter: 1.816 m (5' 11.5\").    Weight as of this encounter: 117.9 kg (260 lb).   Weight management plan: Discussed healthy diet and exercise guidelines    Reviewed start of Lantus   DM ed  See pcp to follow up   See Patient Instructions    Marlene Romero PA-C  [facility]    Luisa Fitzgerald is a 57 year old, presenting for the following health issues:  Diabetes      HPI     Patient is here to follow up on labs and discuss treatments.  Patient seen yesterday for symptoms of excessive thirst weight loss and fatigue  Labs done and results reviewed with patient today   Uncontrolled DM with hyperglycemia.   Hemoglobin A1C   Date Value Ref Range Status   04/24/2023 12.3 (H) 0.0 - 5.6 % Final   07/17/2015 5.8 4.3 - 6.0 % Final                   Review of Systems   Constitutional, HEENT, cardiovascular, pulmonary, gi and gu systems are negative, except as otherwise noted.      Objective    /80   Pulse 102   Temp 97.7  F (36.5  C) (Tympanic)   Resp 16   Ht 1.816 m (5' 11.5\")   Wt 117.9 kg (260 lb)   SpO2 97%   BMI 35.76 kg/m    Body mass index is 35.76 kg/m .  Physical Exam   GENERAL: healthy, alert and no distress  MS: no gross musculoskeletal defects noted, no edema  PSYCH: mentation appears normal, affect normal/bright    Office Visit on 04/24/2023   Component Date Value Ref Range Status     Sodium " 04/24/2023 125 (L)  136 - 145 mmol/L Final     Potassium 04/24/2023 3.9  3.4 - 5.3 mmol/L Final     Chloride 04/24/2023 84 (L)  98 - 107 mmol/L Final     Carbon Dioxide (CO2) 04/24/2023 23  22 - 29 mmol/L Final     Anion Gap 04/24/2023 18 (H)  7 - 15 mmol/L Final     Urea Nitrogen 04/24/2023 16.1  6.0 - 20.0 mg/dL Final     Creatinine 04/24/2023 0.97  0.67 - 1.17 mg/dL Final     Calcium 04/24/2023 10.4 (H)  8.6 - 10.0 mg/dL Final     Glucose 04/24/2023 730 (HH)  70 - 99 mg/dL Final     Alkaline Phosphatase 04/24/2023 214 (H)  40 - 129 U/L Final     AST 04/24/2023 20  10 - 50 U/L Final     ALT 04/24/2023 38  10 - 50 U/L Final     Protein Total 04/24/2023 7.8  6.4 - 8.3 g/dL Final     Albumin 04/24/2023 4.6  3.5 - 5.2 g/dL Final     Bilirubin Total 04/24/2023 0.5  <=1.2 mg/dL Final     GFR Estimate 04/24/2023 >90  >60 mL/min/1.73m2 Final    eGFR calculated using 2021 CKD-EPI equation.     Hemoglobin A1C 04/24/2023 12.3 (H)  0.0 - 5.6 % Final     WBC Count 04/24/2023 8.3  4.0 - 11.0 10e3/uL Final     RBC Count 04/24/2023 5.33  4.40 - 5.90 10e6/uL Final     Hemoglobin 04/24/2023 16.0  13.3 - 17.7 g/dL Final     Hematocrit 04/24/2023 46.4  40.0 - 53.0 % Final     MCV 04/24/2023 87  78 - 100 fL Final     MCH 04/24/2023 30.0  26.5 - 33.0 pg Final     MCHC 04/24/2023 34.5  31.5 - 36.5 g/dL Final     RDW 04/24/2023 12.2  10.0 - 15.0 % Final     Platelet Count 04/24/2023 264  150 - 450 10e3/uL Final     % Neutrophils 04/24/2023 63  % Final     % Lymphocytes 04/24/2023 27  % Final     % Monocytes 04/24/2023 7  % Final     % Eosinophils 04/24/2023 4  % Final     % Basophils 04/24/2023 1  % Final     Absolute Neutrophils 04/24/2023 5.3  1.6 - 8.3 10e3/uL Final     Absolute Lymphocytes 04/24/2023 2.2  0.8 - 5.3 10e3/uL Final     Absolute Monocytes 04/24/2023 0.5  0.0 - 1.3 10e3/uL Final     Absolute Eosinophils 04/24/2023 0.3  0.0 - 0.7 10e3/uL Final     Absolute Basophils 04/24/2023 0.0  0.0 - 0.2 10e3/uL Final

## 2023-04-25 NOTE — PATIENT INSTRUCTIONS
Starting insulin at bedtime with 10 units.   Check morning fasting blood sugar and increase insulin dosing by 2 units every three days. UNTIL fasting blood sugars are between 130 - 180.

## 2023-04-26 ENCOUNTER — TELEPHONE (OUTPATIENT)
Dept: INTERNAL MEDICINE | Facility: CLINIC | Age: 58
End: 2023-04-26

## 2023-04-27 NOTE — TELEPHONE ENCOUNTER
PRIOR AUTHORIZATION DENIED    Medication: insulin glargine (LANTUS SOLOSTAR) 100 UNIT/ML  - EPA DENIED    Denial Date: 4/25/2023    Denial Rational:       Appeal Information:

## 2023-05-09 ENCOUNTER — TELEPHONE (OUTPATIENT)
Dept: EDUCATION SERVICES | Facility: CLINIC | Age: 58
End: 2023-05-09

## 2023-05-09 ENCOUNTER — ALLIED HEALTH/NURSE VISIT (OUTPATIENT)
Dept: EDUCATION SERVICES | Facility: CLINIC | Age: 58
End: 2023-05-09
Attending: PHYSICIAN ASSISTANT
Payer: COMMERCIAL

## 2023-05-09 DIAGNOSIS — E11.9 NEW ONSET TYPE 2 DIABETES MELLITUS (H): ICD-10-CM

## 2023-05-09 DIAGNOSIS — E11.65 TYPE 2 DIABETES MELLITUS WITH HYPERGLYCEMIA, WITH LONG-TERM CURRENT USE OF INSULIN (H): Primary | ICD-10-CM

## 2023-05-09 DIAGNOSIS — Z79.4 TYPE 2 DIABETES MELLITUS WITH HYPERGLYCEMIA, WITH LONG-TERM CURRENT USE OF INSULIN (H): Primary | ICD-10-CM

## 2023-05-09 DIAGNOSIS — E11.65 UNCONTROLLED TYPE 2 DIABETES MELLITUS WITH HYPERGLYCEMIA (H): ICD-10-CM

## 2023-05-09 PROCEDURE — G0108 DIAB MANAGE TRN  PER INDIV: HCPCS

## 2023-05-09 PROCEDURE — 99207 PR NO CHARGE NURSE ONLY: CPT

## 2023-05-09 RX ORDER — METFORMIN HCL 500 MG
500 TABLET, EXTENDED RELEASE 24 HR ORAL 2 TIMES DAILY WITH MEALS
Qty: 60 TABLET | Refills: 1 | Status: SHIPPED | OUTPATIENT
Start: 2023-05-09 | End: 2023-05-18

## 2023-05-09 RX ORDER — INSULIN GLARGINE 100 [IU]/ML
23 INJECTION, SOLUTION SUBCUTANEOUS AT BEDTIME
Qty: 15 ML | Refills: 3 | Status: SHIPPED | OUTPATIENT
Start: 2023-05-09 | End: 2023-06-13

## 2023-05-09 NOTE — PROGRESS NOTES
Diabetes Self-Management Education & Support    Presents for: Initial Assessment for new diagnosis    Type of Service: In Person Visit    Assessment Type:   ASSESSMENT:  Amilcar is seeing elevated blood glucose- all >200 mg/dL, and continues to have frequent urination, although better than it was. Reviewed diet recall and he consumes a lot of carbohydrates at meals due to soda with breakfast and larget portions at dinner.He is willing to try to reduce regular pop and try diet pop or zero sugar pop, and flavored carbonated water to see if can replace regular pop. Suggested if not like taste of alternatives, could try to limit 1 can with lunch since only eating a sandwich and he is agreeable to trying this. Commended him on being active and monitoring blood glucose. Reviewed physiology and how medications work, as well as risk for hypoglycemia and how to treat.    While working on food changes, also recommended increasing Metformin-xr to 2 tablets a day (1000 mg) and Basaglar to 23 units (5 unit increase).  Only did half of recommended increase of 0.1 unit(s)/kg CBW, since also increasing Metformin and may still reduce a large amount of carbohydrates if able to reduce portions and get regular pop down to 0-1 cans a day. Please see telephone encounter from today - routed to PCP to sign since Basaglar is preferred insulin, not Lantus. He was informed that insulin can be adjusted weekly so he is to call or MyChart if fasting blood glucose still mostly >200 mg/dL by Monday. Pt verbalized understanding of concepts discussed and recommendations provided.         Patient's most recent   Lab Results   Component Value Date    A1C 12.3 04/24/2023    A1C 5.8 07/17/2015     is not meeting goal of <7.0    Diabetes knowledge and skills assessment:   Patient is knowledgeable in diabetes management concepts related to: Monitoring and Taking Medication    Continue education with the following diabetes management concepts: Healthy  Eating, Being Active, Monitoring, Taking Medication, Problem Solving, Reducing Risks and Healthy Coping    Based on learning assessment above, most appropriate setting for further diabetes education would be: Group class or Individual setting.      PLAN    -Increase Basaglar to 23 units (5 unit increase)  -Increase Metformin to 2 tablets a day  -Try to reduce carbs -see how 60-75 gm at meals works.    Topics to cover at upcoming visits: Taking Medication, Reducing Risks and Healthy Coping    Follow-up: 6/13/23    See Care Plan for co-developed, patient-state behavior change goals.  AVS provided for patient today.    Education Materials Provided:  blabfeed Healthy Living with Diabetes Book, Safe Disposal Options for Needles & Syringes and Carbohydrate Counting      SUBJECTIVE/OBJECTIVE:  Presents for: Initial Assessment for new diagnosis  Accompanied by: Self  Diabetes education in the past 24mo: No  Focus of Visit: Assistance w/ making life changes  Diabetes type: Type 2  Date of diagnosis: 4/24/23  Disease course: Other  How confident are you filling out medical forms by yourself:: Extremely  Diabetes management related comments/concerns: Says seeing the doctor next week. Trying to get used to the whole ordeal. Not sure what to do with the needles. Says he was not feeling well. Took a few days to get his insulin.  He was told to eat like normal.  Says he increased his insulin every 3 days. Last night, he was up to 18 units (1st night)    Likes the meter that links to his phone. He is a picky eater- no fruits and vegetables. He has been noticing how food affects how he feels. Feeling better with insulin. Had lost 18 lbs prior to diagnosis.    Loves potatoes and pumpkin.     Transportation concerns: No  Other concerns:: Glasses  Cultural Influences/Ethnic Background:  Choose not to answer      Diabetes Symptoms & Complications:  Fatigue: Yes  Neuropathy: No  Polydipsia: Yes  Polyphagia: Sometimes  Polyuria:  "Yes  Visual change: Yes  Slow healing wounds: No  Complications assessed today?: Yes  Autonomic neuropathy: No  CVA: No  Heart disease: No  Nephropathy: No  Peripheral neuropathy: No  Peripheral Vascular Disease: No  Retinopathy: No  Sexual dysfunction: No    Patient Problem List and Family Medical History reviewed for relevant medical history, current medical status, and diabetes risk factors.    Vitals:  There were no vitals taken for this visit.  Estimated body mass index is 35.76 kg/m  as calculated from the following:    Height as of 4/25/23: 1.816 m (5' 11.5\").    Weight as of 4/25/23: 117.9 kg (260 lb).   Last 3 BP:   BP Readings from Last 3 Encounters:   04/25/23 130/80   04/24/23 122/78   07/13/22 136/86       History   Smoking Status     Former     Types: Cigarettes     Quit date: 7/17/1985   Smokeless Tobacco     Never       Labs:  Lab Results   Component Value Date    A1C 12.3 04/24/2023    A1C 5.8 07/17/2015     Lab Results   Component Value Date     04/24/2023     07/13/2022    GLC 96 06/25/2021     Lab Results   Component Value Date     07/13/2022     06/25/2021     HDL Cholesterol   Date Value Ref Range Status   06/25/2021 48 >39 mg/dL Final     Direct Measure HDL   Date Value Ref Range Status   07/13/2022 48 >=40 mg/dL Final   ]  GFR Estimate   Date Value Ref Range Status   04/24/2023 >90 >60 mL/min/1.73m2 Final     Comment:     eGFR calculated using 2021 CKD-EPI equation.   06/25/2021 74 >60 mL/min/[1.73_m2] Final     Comment:     Non  GFR Calc  Starting 12/18/2018, serum creatinine based estimated GFR (eGFR) will be   calculated using the Chronic Kidney Disease Epidemiology Collaboration   (CKD-EPI) equation.       GFR Estimate If Black   Date Value Ref Range Status   06/25/2021 86 >60 mL/min/[1.73_m2] Final     Comment:      GFR Calc  Starting 12/18/2018, serum creatinine based estimated GFR (eGFR) will be   calculated using the Chronic " Kidney Disease Epidemiology Collaboration   (CKD-EPI) equation.       Lab Results   Component Value Date    CR 0.97 04/24/2023    CR 1.11 06/25/2021     No results found for: MICROALBUMIN    Healthy Eating:  Healthy Eating Assessed Today: Yes  Cultural/Yazdanism diet restrictions?: No  Meal planning/habits: None  How many times a week on average do you eat food made away from home (restaurant/take-out)?: 2  Meals include: Breakfast, Lunch, Dinner, Evening Snack  Breakfast: 1-2 soda and bowl Honey Nut Cheerios OR Honey Bunches of Oats (big bowl) with 1% milk  Lunch: hamburger OR ham and cheese sandwich and soda  Dinner: Meat and potatoes OR fish and pasta (processed foods)  Snacks: AM: granola bar OR none; PM: cheese OR almonds  Other: HS: cheese OR almonds OR 3 cookies  Beverages: Water, Soda (4-5 bottles a day)    Being Active:  Being Active Assessed Today: Yes  Exercise:: Yes  Days per week of moderate to strenuous exercise (like a brisk walk): 2 (Walking dog 2-3 times a week.)  On average, minutes per day of exercise at this level: 40 (45-90 minutes)  How intense was your typical exercise? : Moderate (like brisk walking)  Exercise Minutes per Week: 80  Barrier to exercise: Other (Last December took a break and had trouble getting back into it.)    Monitoring:  Monitoring Assessed Today: Yes  Did patient bring glucose meter to appointment? : Yes  Blood Glucose Meter: Accu-chek (Guide meter)  Times checking blood sugar at home (number): 1  Times checking blood sugar at home (per): Day  Blood glucose trend: Decreasing    Blood glucose:  Fasting (4/27-5/9): 256, 250, 273, 234, 278, 312, 280, 306, 307, 309, 376, 259, 353    Taking Medications:  Diabetes Medication(s)     Biguanides       metFORMIN (GLUCOPHAGE XR) 500 MG 24 hr tablet    Take 1 tablet (500 mg) by mouth daily (with dinner)    Insulin       insulin glargine (LANTUS SOLOSTAR) 100 UNIT/ML pen    Inject 10 Units Subcutaneous At Bedtime          Taking  Medication Assessed Today: Yes  Current Treatments: Insulin Injections, Oral Medication (taken by mouth)  Problems taking diabetes medications regularly?: No  Diabetes medication side effects?: No    Problem Solving:  Problem Solving Assessed Today: Yes  Is the patient at risk for hypoglycemia?: Yes  Hypoglycemia Frequency: Never              Reducing Risks:  Reducing Risks Assessed Today: No  Diabetes Risks: Age over 45 years  CAD Risks: Diabetes Mellitus, Dyslipidemia, Family history, Hypertension, Obesity, Stress  Has dilated eye exam at least once a year?: Yes  Sees dentist every 6 months?: No  Feet checked by healthcare provider in the last year?: No    Healthy Coping:  Healthy Coping Assessed Today: Yes  Emotional response to diabetes: Ready to learn  Informal Support system:: Children, Friends  Stage of change: ACTION (Actively working towards change)  Patient Activation Measure Survey Score:       View : No data to display.                  Care Plan and Education Provided:  Care Plan: Diabetes   Updates made by Pat Fallon RD since 5/9/2023 12:00 AM      Problem: HbA1C Not In Goal       Goal: Establish Regular Follow-Ups with PCP       Task: Discuss with PCP the recommended timing for patient's next follow up visit(s)    Responsible User: Pat Fallon RD      Task: Discuss schedule for PCP visits with patient Completed 5/9/2023   Responsible User: Pat Fallon RD      Goal: Get HbA1C Level in Goal       Task: Educate patient on diabetes education self-management topics    Responsible User: Pat Fallon RD      Task: Educate patient on benefits of regular glucose monitoring Completed 5/9/2023   Responsible User: Pat Fallon RD      Task: Refer patient to appropriate extended care team member, as needed (Medication Therapy Management, Behavioral Health, Physical Therapy, etc.)    Responsible User: Pat Fallon RD      Task: Discuss diabetes treatment plan with patient Completed  5/9/2023   Responsible User: Pat Fallon RD      Problem: Diabetes Self-Management Education Needed to Optimize Self-Care Behaviors       Goal: Understand diabetes pathophysiology and disease progression       Task: Provide education on diabetes pathophysiology and disease progression specfic to patient's diabetes type Completed 5/9/2023   Responsible User: Pat Fallon RD      Goal: Healthy Eating - follow a healthy eating pattern for diabetes       Task: Provide education on portion control and consistency in amount, composition and timing of food intake Completed 5/9/2023   Responsible User: Pat Fallon RD      Task: Provide education on managing carbohydrate intake (carbohydrate counting, plate planning method, etc.) Completed 5/9/2023   Responsible User: Pat Fallon RD      Task: Provide education on weight management    Responsible User: Pat Fallon RD      Task: Provide education on heart healthy eating    Responsible User: Pat Fallon RD      Task: Provide education on eating out    Responsible User: Pat Fallon RD      Task: Develop individualized healthy eating plan with patient Completed 5/9/2023   Responsible User: Pat Fallon RD      Goal: Being Active - get regular physical activity, working up to at least 150 minutes per week       Task: Provide education on relationship of activity to glucose and precautions to take if at risk for low glucose Completed 5/9/2023   Responsible User: Pat Fallon RD      Task: Discuss barriers to physical activity with patient Completed 5/9/2023   Responsible User: Pat Fallon RD      Task: Develop physical activity plan with patient Completed 5/9/2023   Responsible User: Pat Fallon RD      Task: Explore community resources including walking groups, assistance programs, and home videos    Responsible User: Pat Fallon RD      Goal: Monitoring - monitor glucose and ketones as directed       Task: Provide  education on blood glucose monitoring (purpose, proper technique, frequency, glucose targets, interpreting results, when to use glucose control solution, sharps disposal) Completed 5/9/2023   Responsible User: Pat Fallon RD      Task: Provide education on continuous glucose monitoring (sensor placement, use of concha or /reader, understanding glucose trends, alerts and alarms, differences between sensor glucose and blood glucose)    Responsible User: Pat Fallon RD      Task: Provide education on ketone monitoring (when to monitor, frequency, etc.)    Responsible User: Pat Fallon RD      Goal: Taking Medication - patient is consistently taking medications as directed    This Visit's Progress: 60%   Note:    Increase Basaglar to 23 units and 2 Metformin a day.     Task: Provide education on action of prescribed medication, including when to take and possible side effects Completed 5/9/2023   Responsible User: Pat Fallon RD      Task: Provide education on insulin and injectable diabetes medications, including administration, storage, site selection and rotation for injection sites    Responsible User: Pat Fallon RD      Task: Discuss barriers to medication adherence with patient and provide management technique ideas as appropriate    Responsible User: Pat Fallon RD      Task: Provide education on frequency and refill details of medications    Responsible User: Pat Fallon RD      Goal: Problem Solving - know how to prevent and manage short-term diabetes complications       Task: Provide education on high blood glucose - causes, signs/symptoms, prevention and treatment Completed 5/9/2023   Responsible User: Pat Fallon RD      Task: Provide education on low blood glucose - causes, signs/symptoms, prevention, treatment, carrying a carbohydrate source at all times, and medical identification    Responsible User: Pat Fallon RD   Note:    Reviewed all but medical  ID     Task: Provide education on safe travel with diabetes    Responsible User: Pat Fallon RD      Task: Provide education on how to care for diabetes on sick days    Responsible User: Pat Fallon RD      Task: Provide education on when to call a health care provider    Responsible User: Pat Fallon RD      Goal: Reducing Risks - know how to prevent and treat long-term diabetes complications       Task: Provide education on major complications of diabetes, prevention, early diagnostic measures and treatment of complications    Responsible User: Pat Fallon RD      Task: Provide education on recommended care for dental, eye and foot health    Responsible User: Pat Fallon RD      Task: Provide education on Hemoglobin A1c - goals and relationship to blood glucose levels    Responsible User: Pat Fallon RD      Task: Provide education on recommendations for heart health - lipid levels and goals, blood pressure and goals, and aspirin therapy, if indicated    Responsible User: Pat Fallon RD      Task: Provide education on tobacco cessation    Responsible User: Pat Fallon RD      Goal: Healthy Coping - use available resources to cope with the challenges of managing diabetes       Task: Discuss recognizing feelings about having diabetes    Responsible User: Pat Fallon RD      Task: Provide education on the benefits of making appropriate lifestyle changes Completed 5/9/2023   Responsible User: Pat Fallon RD      Task: Provide education on benefits of utilizing support systems Completed 5/9/2023   Responsible User: Pat Fallon RD      Task: Discuss methods for coping with stress    Responsible User: Pat Fallon RD      Task: Provide education on when to seek professional counseling    Responsible User: Pat Fallon RD Kaydee Brown, RDN, [facility], [facility]     Time Spent: 70 minutes  Encounter Type: Individual    Any diabetes medication dose changes were  made via the CDE Protocol per the patient's referring provider. A copy of this encounter was shared with the provider.

## 2023-05-09 NOTE — TELEPHONE ENCOUNTER
Lantus removed from medication list and encounter has been closed.    Pat Fallon RDN, LD, [facility]

## 2023-05-09 NOTE — LETTER
5/9/2023         RE: Amilcar Yang  [address] [facility] [address]        Dear Colleague,    Thank you for referring your patient, Amilcar Yang, to the [facility]. Please see a copy of my visit note below.    Diabetes Self-Management Education & Support    Presents for: Initial Assessment for new diagnosis    Type of Service: In Person Visit    Assessment Type:   ASSESSMENT:  Amilcar is seeing elevated blood glucose- all >200 mg/dL, and continues to have frequent urination, although better than it was. Reviewed diet recall and he consumes a lot of carbohydrates at meals due to soda with breakfast and larget portions at dinner.He is willing to try to reduce regular pop and try diet pop or zero sugar pop, and flavored carbonated water to see if can replace regular pop. Suggested if not like taste of alternatives, could try to limit 1 can with lunch since only eating a sandwich and he is agreeable to trying this. Commended him on being active and monitoring blood glucose. Reviewed physiology and how medications work, as well as risk for hypoglycemia and how to treat.    While working on food changes, also recommended increasing Metformin-xr to 2 tablets a day (1000 mg) and Basaglar to 23 units (5 unit increase).  Only did half of recommended increase of 0.1 unit(s)/kg CBW, since also increasing Metformin and may still reduce a large amount of carbohydrates if able to reduce portions and get regular pop down to 0-1 cans a day. Please see telephone encounter from today - routed to PCP to sign since Basaglar is preferred insulin, not Lantus. He was informed that insulin can be adjusted weekly so he is to call or MyChart if fasting blood glucose still mostly >200 mg/dL by Monday. Pt verbalized understanding of concepts discussed and recommendations provided.         Patient's most recent   Lab Results   Component Value Date    A1C 12.3 04/24/2023    A1C 5.8 07/17/2015     is  not meeting goal of <7.0    Diabetes knowledge and skills assessment:   Patient is knowledgeable in diabetes management concepts related to: Monitoring and Taking Medication    Continue education with the following diabetes management concepts: Healthy Eating, Being Active, Monitoring, Taking Medication, Problem Solving, Reducing Risks and Healthy Coping    Based on learning assessment above, most appropriate setting for further diabetes education would be: Group class or Individual setting.      PLAN    -Increase Basaglar to 23 units (5 unit increase)  -Increase Metformin to 2 tablets a day  -Try to reduce carbs -see how 60-75 gm at meals works.    Topics to cover at upcoming visits: Taking Medication, Reducing Risks and Healthy Coping    Follow-up: 6/13/23    See Care Plan for co-developed, patient-state behavior change goals.  AVS provided for patient today.    Education Materials Provided:  [email or web address] Healthy Living with Diabetes Book, Safe Disposal Options for Needles & Syringes and Carbohydrate Counting      SUBJECTIVE/OBJECTIVE:  Presents for: Initial Assessment for new diagnosis  Accompanied by: Self  Diabetes education in the past 24mo: No  Focus of Visit: Assistance w/ making life changes  Diabetes type: Type 2  Date of diagnosis: 4/24/23  Disease course: Other  How confident are you filling out medical forms by yourself:: Extremely  Diabetes management related comments/concerns: Says seeing the doctor next week. Trying to get used to the whole ordeal. Not sure what to do with the needles. Says he was not feeling well. Took a few days to get his insulin.  He was told to eat like normal.  Says he increased his insulin every 3 days. Last night, he was up to 18 units (1st night)    Likes the meter that links to his phone. He is a picky eater- no fruits and vegetables. He has been noticing how food affects how he feels. Feeling better with insulin. Had lost 18 lbs prior to diagnosis.    Loves potatoes  "and pumpkin.     Transportation concerns: No  Other concerns:: Glasses  Cultural Influences/Ethnic Background:  Choose not to answer      Diabetes Symptoms & Complications:  Fatigue: Yes  Neuropathy: No  Polydipsia: Yes  Polyphagia: Sometimes  Polyuria: Yes  Visual change: Yes  Slow healing wounds: No  Complications assessed today?: Yes  Autonomic neuropathy: No  CVA: No  Heart disease: No  Nephropathy: No  Peripheral neuropathy: No  Peripheral Vascular Disease: No  Retinopathy: No  Sexual dysfunction: No    Patient Problem List and Family Medical History reviewed for relevant medical history, current medical status, and diabetes risk factors.    Vitals:  There were no vitals taken for this visit.  Estimated body mass index is 35.76 kg/m  as calculated from the following:    Height as of 4/25/23: 1.816 m (5' 11.5\").    Weight as of 4/25/23: 117.9 kg (260 lb).   Last 3 BP:   BP Readings from Last 3 Encounters:   04/25/23 130/80   04/24/23 122/78   07/13/22 136/86       History   Smoking Status     Former     Types: Cigarettes     Quit date: 7/17/1985   Smokeless Tobacco     Never       Labs:  Lab Results   Component Value Date    A1C 12.3 04/24/2023    A1C 5.8 07/17/2015     Lab Results   Component Value Date     04/24/2023     07/13/2022    GLC 96 06/25/2021     Lab Results   Component Value Date     07/13/2022     06/25/2021     HDL Cholesterol   Date Value Ref Range Status   06/25/2021 48 >39 mg/dL Final     Direct Measure HDL   Date Value Ref Range Status   07/13/2022 48 >=40 mg/dL Final   ]  GFR Estimate   Date Value Ref Range Status   04/24/2023 >90 >60 mL/min/1.73m2 Final     Comment:     eGFR calculated using 2021 CKD-EPI equation.   06/25/2021 74 >60 mL/min/[1.73_m2] Final     Comment:     Non  GFR Calc  Starting 12/18/2018, serum creatinine based estimated GFR (eGFR) will be   calculated using the Chronic Kidney Disease Epidemiology Collaboration   (CKD-EPI) " equation.       GFR Estimate If Black   Date Value Ref Range Status   06/25/2021 86 >60 mL/min/[1.73_m2] Final     Comment:      GFR Calc  Starting 12/18/2018, serum creatinine based estimated GFR (eGFR) will be   calculated using the Chronic Kidney Disease Epidemiology Collaboration   (CKD-EPI) equation.       Lab Results   Component Value Date    CR 0.97 04/24/2023    CR 1.11 06/25/2021     No results found for: MICROALBUMIN    Healthy Eating:  Healthy Eating Assessed Today: Yes  Cultural/Yarsani diet restrictions?: No  Meal planning/habits: None  How many times a week on average do you eat food made away from home (restaurant/take-out)?: 2  Meals include: Breakfast, Lunch, Dinner, Evening Snack  Breakfast: 1-2 soda and bowl Honey Nut Cheerios OR Honey Bunches of Oats (big bowl) with 1% milk  Lunch: hamburger OR ham and cheese sandwich and soda  Dinner: Meat and potatoes OR fish and pasta (processed foods)  Snacks: AM: granola bar OR none; PM: cheese OR almonds  Other: HS: cheese OR almonds OR 3 cookies  Beverages: Water, Soda (4-5 bottles a day)    Being Active:  Being Active Assessed Today: Yes  Exercise:: Yes  Days per week of moderate to strenuous exercise (like a brisk walk): 2 (Walking dog 2-3 times a week.)  On average, minutes per day of exercise at this level: 40 (45-90 minutes)  How intense was your typical exercise? : Moderate (like brisk walking)  Exercise Minutes per Week: 80  Barrier to exercise: Other (Last December took a break and had trouble getting back into it.)    Monitoring:  Monitoring Assessed Today: Yes  Did patient bring glucose meter to appointment? : Yes  Blood Glucose Meter: Accu-chek (Guide meter)  Times checking blood sugar at home (number): 1  Times checking blood sugar at home (per): Day  Blood glucose trend: Decreasing    Blood glucose:  Fasting (4/27-5/9): 256, 250, 273, 234, 278, 312, 280, 306, 307, 309, 376, 259, 353    Taking Medications:  Diabetes  Medication(s)     Biguanides       metFORMIN (GLUCOPHAGE XR) 500 MG 24 hr tablet    Take 1 tablet (500 mg) by mouth daily (with dinner)    Insulin       insulin glargine (LANTUS SOLOSTAR) 100 UNIT/ML pen    Inject 10 Units Subcutaneous At Bedtime          Taking Medication Assessed Today: Yes  Current Treatments: Insulin Injections, Oral Medication (taken by mouth)  Problems taking diabetes medications regularly?: No  Diabetes medication side effects?: No    Problem Solving:  Problem Solving Assessed Today: Yes  Is the patient at risk for hypoglycemia?: Yes  Hypoglycemia Frequency: Never              Reducing Risks:  Reducing Risks Assessed Today: No  Diabetes Risks: Age over 45 years  CAD Risks: Diabetes Mellitus, Dyslipidemia, Family history, Hypertension, Obesity, Stress  Has dilated eye exam at least once a year?: Yes  Sees dentist every 6 months?: No  Feet checked by healthcare provider in the last year?: No    Healthy Coping:  Healthy Coping Assessed Today: Yes  Emotional response to diabetes: Ready to learn  Informal Support system:: Children, Friends  Stage of change: ACTION (Actively working towards change)  Patient Activation Measure Survey Score:       View : No data to display.                  Care Plan and Education Provided:  Care Plan: Diabetes   Updates made by Pat Fallon RD since 5/9/2023 12:00 AM      Problem: HbA1C Not In Goal       Goal: Establish Regular Follow-Ups with PCP       Task: Discuss with PCP the recommended timing for patient's next follow up visit(s)    Responsible User: Pat Fallon RD      Task: Discuss schedule for PCP visits with patient Completed 5/9/2023   Responsible User: Pat Fallon RD      Goal: Get HbA1C Level in Goal       Task: Educate patient on diabetes education self-management topics    Responsible User: Pat Fallon RD      Task: Educate patient on benefits of regular glucose monitoring Completed 5/9/2023   Responsible User: Pat Fallon RD       Task: Refer patient to appropriate extended care team member, as needed (Medication Therapy Management, Behavioral Health, Physical Therapy, etc.)    Responsible User: Pat Fallon RD      Task: Discuss diabetes treatment plan with patient Completed 5/9/2023   Responsible User: Pat Fallon RD      Problem: Diabetes Self-Management Education Needed to Optimize Self-Care Behaviors       Goal: Understand diabetes pathophysiology and disease progression       Task: Provide education on diabetes pathophysiology and disease progression specfic to patient's diabetes type Completed 5/9/2023   Responsible User: Pat Fallon RD      Goal: Healthy Eating - follow a healthy eating pattern for diabetes       Task: Provide education on portion control and consistency in amount, composition and timing of food intake Completed 5/9/2023   Responsible User: Pat Fallon RD      Task: Provide education on managing carbohydrate intake (carbohydrate counting, plate planning method, etc.) Completed 5/9/2023   Responsible User: Pat Fallon RD      Task: Provide education on weight management    Responsible User: Pat Fallon RD      Task: Provide education on heart healthy eating    Responsible User: Pat Fallon RD      Task: Provide education on eating out    Responsible User: Pat Fallon RD      Task: Develop individualized healthy eating plan with patient Completed 5/9/2023   Responsible User: Pat Fallon RD      Goal: Being Active - get regular physical activity, working up to at least 150 minutes per week       Task: Provide education on relationship of activity to glucose and precautions to take if at risk for low glucose Completed 5/9/2023   Responsible User: Pat Fallon RD      Task: Discuss barriers to physical activity with patient Completed 5/9/2023   Responsible User: Pat Fallon RD      Task: Develop physical activity plan with patient Completed 5/9/2023   Responsible  User: Pat Fallon RD      Task: Explore community resources including walking groups, assistance programs, and home videos    Responsible User: Pat Fallon RD      Goal: Monitoring - monitor glucose and ketones as directed       Task: Provide education on blood glucose monitoring (purpose, proper technique, frequency, glucose targets, interpreting results, when to use glucose control solution, sharps disposal) Completed 5/9/2023   Responsible User: Pat Fallon RD      Task: Provide education on continuous glucose monitoring (sensor placement, use of concha or /reader, understanding glucose trends, alerts and alarms, differences between sensor glucose and blood glucose)    Responsible User: Pat Fallon RD      Task: Provide education on ketone monitoring (when to monitor, frequency, etc.)    Responsible User: Pat Fallon RD      Goal: Taking Medication - patient is consistently taking medications as directed    This Visit's Progress: 60%   Note:    Increase Basaglar to 23 units and 2 Metformin a day.     Task: Provide education on action of prescribed medication, including when to take and possible side effects Completed 5/9/2023   Responsible User: Pat Fallon RD      Task: Provide education on insulin and injectable diabetes medications, including administration, storage, site selection and rotation for injection sites    Responsible User: Pat Fallon RD      Task: Discuss barriers to medication adherence with patient and provide management technique ideas as appropriate    Responsible User: Pat Fallon RD      Task: Provide education on frequency and refill details of medications    Responsible User: Pat Fallon RD      Goal: Problem Solving - know how to prevent and manage short-term diabetes complications       Task: Provide education on high blood glucose - causes, signs/symptoms, prevention and treatment Completed 5/9/2023   Responsible User: Pat Fallon  JENNIFER      Task: Provide education on low blood glucose - causes, signs/symptoms, prevention, treatment, carrying a carbohydrate source at all times, and medical identification    Responsible User: Pat Fallon RD   Note:    Reviewed all but medical ID     Task: Provide education on safe travel with diabetes    Responsible User: Pat Fallon RD      Task: Provide education on how to care for diabetes on sick days    Responsible User: Pat Fallon RD      Task: Provide education on when to call a health care provider    Responsible User: Pat Fallon RD      Goal: Reducing Risks - know how to prevent and treat long-term diabetes complications       Task: Provide education on major complications of diabetes, prevention, early diagnostic measures and treatment of complications    Responsible User: Pat Fallon RD      Task: Provide education on recommended care for dental, eye and foot health    Responsible User: Pat Fallon RD      Task: Provide education on Hemoglobin A1c - goals and relationship to blood glucose levels    Responsible User: Pat Fallon RD      Task: Provide education on recommendations for heart health - lipid levels and goals, blood pressure and goals, and aspirin therapy, if indicated    Responsible User: Pat Fallon RD      Task: Provide education on tobacco cessation    Responsible User: Pat Fallon RD      Goal: Healthy Coping - use available resources to cope with the challenges of managing diabetes       Task: Discuss recognizing feelings about having diabetes    Responsible User: Pat Fallon RD      Task: Provide education on the benefits of making appropriate lifestyle changes Completed 5/9/2023   Responsible User: Pat Fallon RD      Task: Provide education on benefits of utilizing support systems Completed 5/9/2023   Responsible User: Pat Fallon RD      Task: Discuss methods for coping with stress    Responsible User: Pat Fallon RD       Task: Provide education on when to seek professional counseling    Responsible User: Pat Fallon RD Kaydee Brown, RDN, LD, [facility]     Time Spent: 70 minutes  Encounter Type: Individual    Any diabetes medication dose changes were made via the CDE Protocol per the patient's referring provider. A copy of this encounter was shared with the provider.

## 2023-05-09 NOTE — PATIENT INSTRUCTIONS
Try diet soda (diet or zero sugar) - won't raise blood glucose like the regular soda will.     2. Try to limit carbohydrates to 60-75 gm and snacks 15-30 gm.    3. Goal blood glucose before meals:  mg/dL  2 hours after the start: <180 mg/dL.     4. Good to carry blood glucose when on insulin in case of a low blood glucose.     5. Increase Metformin to 2 tablets a day (1000 mg)  Increase Basaglar insulin to 23 units (5 unit increase) at bedtime    FOLLOW UP APPOINTMENT: In person follow up at [facility] on Tuesday, June 13th at 1:30pm.    Please call or send a JustBook message with blood glucose if still mostly above 200 mg/dL in the morning by early next week.     Pat Fallon RDN, LD, [facility]   [phone]

## 2023-05-09 NOTE — TELEPHONE ENCOUNTER
Dwight Rosario,    Lantus is still listed in medications and I am not able to place order for correct one myself (needs your signature), so I pended 23 units of Basaglar for you to review and sign. I can discontinue Lantus once this has been done. He was following self adjustment protocol you provided and at 18 units todayl.    Just FYI: I met with Amilcar today and recommended he increase Metformin to 2 tablets a day (1000 mg) and increase Basaglar from 18 units to 23 units (5 unit increase) at bedtime.  He was seeing all fasting blood glucose >200 mg/dL. He also has some food changes he is going to try.  He was recommended to reach out again early next week if fasting blood glucose still mostly >200 mg/dL.    Thanks,  Pat Fallon,  JENNIFERN, LD, [facility]

## 2023-05-11 ASSESSMENT — ANXIETY QUESTIONNAIRES
GAD7 TOTAL SCORE: 2
6. BECOMING EASILY ANNOYED OR IRRITABLE: NOT AT ALL
GAD7 TOTAL SCORE: 2
1. FEELING NERVOUS, ANXIOUS, OR ON EDGE: SEVERAL DAYS
2. NOT BEING ABLE TO STOP OR CONTROL WORRYING: NOT AT ALL
7. FEELING AFRAID AS IF SOMETHING AWFUL MIGHT HAPPEN: NOT AT ALL
GAD7 TOTAL SCORE: 2
IF YOU CHECKED OFF ANY PROBLEMS ON THIS QUESTIONNAIRE, HOW DIFFICULT HAVE THESE PROBLEMS MADE IT FOR YOU TO DO YOUR WORK, TAKE CARE OF THINGS AT HOME, OR GET ALONG WITH OTHER PEOPLE: SOMEWHAT DIFFICULT
8. IF YOU CHECKED OFF ANY PROBLEMS, HOW DIFFICULT HAVE THESE MADE IT FOR YOU TO DO YOUR WORK, TAKE CARE OF THINGS AT HOME, OR GET ALONG WITH OTHER PEOPLE?: SOMEWHAT DIFFICULT
4. TROUBLE RELAXING: SEVERAL DAYS
5. BEING SO RESTLESS THAT IT IS HARD TO SIT STILL: NOT AT ALL
7. FEELING AFRAID AS IF SOMETHING AWFUL MIGHT HAPPEN: NOT AT ALL
3. WORRYING TOO MUCH ABOUT DIFFERENT THINGS: NOT AT ALL

## 2023-05-16 ENCOUNTER — MYC MEDICAL ADVICE (OUTPATIENT)
Dept: EDUCATION SERVICES | Facility: CLINIC | Age: 58
End: 2023-05-16
Payer: COMMERCIAL

## 2023-05-18 ENCOUNTER — OFFICE VISIT (OUTPATIENT)
Dept: FAMILY MEDICINE | Facility: CLINIC | Age: 58
End: 2023-05-18
Payer: COMMERCIAL

## 2023-05-18 VITALS
HEIGHT: 71 IN | TEMPERATURE: 97.8 F | RESPIRATION RATE: 21 BRPM | WEIGHT: 260 LBS | DIASTOLIC BLOOD PRESSURE: 76 MMHG | HEART RATE: 89 BPM | BODY MASS INDEX: 36.4 KG/M2 | OXYGEN SATURATION: 97 % | SYSTOLIC BLOOD PRESSURE: 126 MMHG

## 2023-05-18 DIAGNOSIS — E11.59 TYPE 2 DIABETES MELLITUS WITH OTHER CIRCULATORY COMPLICATION, UNSPECIFIED WHETHER LONG TERM INSULIN USE (H): ICD-10-CM

## 2023-05-18 DIAGNOSIS — E66.01 SEVERE OBESITY (BMI 35.0-35.9 WITH COMORBIDITY) (H): ICD-10-CM

## 2023-05-18 DIAGNOSIS — E11.65 UNCONTROLLED TYPE 2 DIABETES MELLITUS WITH HYPERGLYCEMIA (H): ICD-10-CM

## 2023-05-18 DIAGNOSIS — Z12.5 SCREENING FOR PROSTATE CANCER: Primary | ICD-10-CM

## 2023-05-18 DIAGNOSIS — E11.9 NEW ONSET TYPE 2 DIABETES MELLITUS (H): ICD-10-CM

## 2023-05-18 DIAGNOSIS — I10 BENIGN ESSENTIAL HYPERTENSION: ICD-10-CM

## 2023-05-18 DIAGNOSIS — E78.5 HYPERLIPIDEMIA LDL GOAL <160: ICD-10-CM

## 2023-05-18 PROCEDURE — 99214 OFFICE O/P EST MOD 30 MIN: CPT | Performed by: FAMILY MEDICINE

## 2023-05-18 RX ORDER — METFORMIN HCL 500 MG
500 TABLET, EXTENDED RELEASE 24 HR ORAL 2 TIMES DAILY WITH MEALS
Qty: 180 TABLET | Refills: 1 | Status: SHIPPED | OUTPATIENT
Start: 2023-05-18 | End: 2023-06-13

## 2023-05-18 RX ORDER — PROCHLORPERAZINE 25 MG/1
SUPPOSITORY RECTAL
Qty: 3 EACH | Refills: 5 | Status: SHIPPED | OUTPATIENT
Start: 2023-05-18 | End: 2023-08-21 | Stop reason: ALTCHOICE

## 2023-05-18 RX ORDER — PROCHLORPERAZINE 25 MG/1
SUPPOSITORY RECTAL
Qty: 1 EACH | Refills: 1 | Status: SHIPPED | OUTPATIENT
Start: 2023-05-18 | End: 2023-08-21 | Stop reason: ALTCHOICE

## 2023-05-18 RX ORDER — PROCHLORPERAZINE 25 MG/1
SUPPOSITORY RECTAL
Qty: 1 EACH | Refills: 0 | Status: SHIPPED | OUTPATIENT
Start: 2023-05-18 | End: 2023-08-21 | Stop reason: ALTCHOICE

## 2023-05-18 ASSESSMENT — ANXIETY QUESTIONNAIRES: GAD7 TOTAL SCORE: 2

## 2023-05-18 ASSESSMENT — PAIN SCALES - GENERAL: PAINLEVEL: NO PAIN (0)

## 2023-05-18 ASSESSMENT — ENCOUNTER SYMPTOMS: NERVOUS/ANXIOUS: 1

## 2023-05-18 NOTE — LETTER
May 18, 2023      Amilcar Yang  [address] [facility] [address]        To Whom It May Concern:    Amilcar Yang was seen in our clinic for his current health condition. He may return to work with the following: must be able to take a break for 10-15 mins every 2 hrs to rest for his mental health condition.      Sincerely,        Chris Eason MD

## 2023-05-18 NOTE — PROGRESS NOTES
Assessment & Plan     Type 2 diabetes mellitus with other circulatory complication, unspecified whether long term insulin use (H)  Has been gradually improving, will have him to try continuous monitoring if covered by insurance,   - CBC with platelets; Future  - Comprehensive metabolic panel (BMP + Alb, Alk Phos, ALT, AST, Total. Bili, TP); Future  - Lipid panel reflex to direct LDL Fasting; Future  - Albumin Random Urine Quantitative with Creat Ratio; Future  - Continuous Blood Gluc Sensor (DEXCOM G6 SENSOR) MISC; Change every 10 days.  - Continuous Blood Gluc  (DEXCOM G6 ) KIMBERLY; Use to read blood sugars as per 's instructions.  - Continuous Blood Gluc Transmit (DEXCOM G6 TRANSMITTER) MISC; Change every 3 months.    New onset type 2 diabetes mellitus (H)  Mentioned above   - metFORMIN (GLUCOPHAGE XR) 500 MG 24 hr tablet; Take 1 tablet (500 mg) by mouth 2 times daily (with meals)    Uncontrolled type 2 diabetes mellitus with hyperglycemia (H)  Mentioned above   - metFORMIN (GLUCOPHAGE XR) 500 MG 24 hr tablet; Take 1 tablet (500 mg) by mouth 2 times daily (with meals)  - Continuous Blood Gluc Sensor (DEXCOM G6 SENSOR) MISC; Change every 10 days.  - Continuous Blood Gluc  (DEXCOM G6 ) KIMBERLY; Use to read blood sugars as per 's instructions.  - Continuous Blood Gluc Transmit (DEXCOM G6 TRANSMITTER) MISC; Change every 3 months.    Severe obesity (BMI 35.0-35.9 with comorbidity) (H)  Not improved yet, encouraged him to continue working on life style modification   - Continuous Blood Gluc Sensor (DEXCOM G6 SENSOR) MISC; Change every 10 days.  - Continuous Blood Gluc  (DEXCOM G6 ) KIMBERLY; Use to read blood sugars as per 's instructions.  - Continuous Blood Gluc Transmit (DEXCOM G6 TRANSMITTER) MISC; Change every 3 months.    Screening for prostate cancer    - PSA, screen; Future    Hyperlipidemia LDL goal <160    - Continuous Blood Gluc  Sensor (DEXCOM G6 SENSOR) MISC; Change every 10 days.  - Continuous Blood Gluc  (DEXCOM G6 ) KIMBERLY; Use to read blood sugars as per 's instructions.  - Continuous Blood Gluc Transmit (DEXCOM G6 TRANSMITTER) MISC; Change every 3 months.    Benign essential hypertension    - Continuous Blood Gluc Sensor (DEXCOM G6 SENSOR) MISC; Change every 10 days.  - Continuous Blood Gluc  (DEXCOM G6 ) KIMBERLY; Use to read blood sugars as per 's instructions.  - Continuous Blood Gluc Transmit (DEXCOM G6 TRANSMITTER) MISC; Change every 3 months.             FUTURE APPOINTMENTS:       - Follow-up visit in 2 months for ALEXA Eason MD  [facility] DEWEY Fitzgerald is a 57 year old, presenting for the following health issues:  Diabetes (Follow up, recent diagnosis) and Anxiety (Anxiety about returning to work onsite )        5/18/2023     2:43 PM   Additional Questions   Roomed by Laney SPRING     Anxiety    History of Present Illness       Mental Health Follow-up:  Patient presents to follow-up on Anxiety.    Patient's anxiety since last visit has been:  Better  The patient is having other symptoms associated with anxiety.  Any significant life events: health concerns  Patient is feeling anxious or having panic attacks.  Patient has no concerns about alcohol or drug use.    Diabetes:   He presents for follow up of diabetes.  He is checking home blood glucose one time daily. He checks blood glucose before meals.  Blood glucose is sometimes over 200 and never under 70. When his blood glucose is low, the patient is asymptomatic for confusion, blurred vision, lethargy and reports not feeling dizzy, shaky, or weak.  He is concerned about blood sugar frequently over 200.  He is having excessive thirst and blurry vision. The patient has had a diabetic eye exam in the last 12 months. Eye exam performed on Mar 23. Location of last eye exam [facility]  "[place].        He eats 0-1 servings of fruits and vegetables daily.He consumes 2 sweetened beverage(s) daily.He exercises with enough effort to increase his heart rate 30 to 60 minutes per day.  He exercises with enough effort to increase his heart rate 4 days per week.   He is taking medications regularly.  Today's NATALIE-7 Score: 2         Review of Systems   Psychiatric/Behavioral: The patient is nervous/anxious.             Objective    /76   Pulse 89   Temp 97.8  F (36.6  C) (Temporal)   Resp 21   Ht 1.803 m (5' 11\")   Wt 117.9 kg (260 lb)   SpO2 97%   BMI 36.26 kg/m    Body mass index is 36.26 kg/m .  Physical Exam   GENERAL: healthy, alert and no distress  EYES: Eyes grossly normal to inspection, PERRL and conjunctivae and sclerae normal  HENT: ear canals and TM's normal, nose and mouth without ulcers or lesions  NECK: no adenopathy, no asymmetry, masses, or scars and thyroid normal to palpation  RESP: lungs clear to auscultation - no rales, rhonchi or wheezes  CV: regular rate and rhythm, normal S1 S2, no S3 or S4, no murmur, click or rub, no peripheral edema and peripheral pulses strong  ABDOMEN: soft, nontender, no hepatosplenomegaly, no masses and bowel sounds normal  MS: no gross musculoskeletal defects noted, no edema  SKIN: no suspicious lesions or rashes  NEURO: Normal strength and tone, mentation intact and speech normal  Diabetic foot exam:                     "

## 2023-05-19 ENCOUNTER — TELEPHONE (OUTPATIENT)
Dept: FAMILY MEDICINE | Facility: CLINIC | Age: 58
End: 2023-05-19

## 2023-05-19 DIAGNOSIS — E66.01 SEVERE OBESITY (BMI 35.0-35.9 WITH COMORBIDITY) (H): ICD-10-CM

## 2023-05-19 DIAGNOSIS — I10 BENIGN ESSENTIAL HYPERTENSION: ICD-10-CM

## 2023-05-19 DIAGNOSIS — E78.5 HYPERLIPIDEMIA LDL GOAL <160: ICD-10-CM

## 2023-05-19 DIAGNOSIS — E11.59 TYPE 2 DIABETES MELLITUS WITH OTHER CIRCULATORY COMPLICATION, UNSPECIFIED WHETHER LONG TERM INSULIN USE (H): ICD-10-CM

## 2023-05-19 DIAGNOSIS — E11.65 UNCONTROLLED TYPE 2 DIABETES MELLITUS WITH HYPERGLYCEMIA (H): ICD-10-CM

## 2023-05-19 RX ORDER — PROCHLORPERAZINE 25 MG/1
SUPPOSITORY RECTAL
Qty: 1 EACH | Refills: 1 | Status: CANCELLED | OUTPATIENT
Start: 2023-05-19

## 2023-05-19 RX ORDER — PROCHLORPERAZINE 25 MG/1
SUPPOSITORY RECTAL
Qty: 1 EACH | Refills: 0 | Status: CANCELLED | OUTPATIENT
Start: 2023-05-19

## 2023-05-19 NOTE — TELEPHONE ENCOUNTER
Prior Authorization Retail Medication Request    Medication/Dose:Dexcom Sensor   ICD code (if different than what is on RX):    Previously Tried and Failed:    Rationale:      Insurance Name:    Insurance ID:        Pharmacy Information (if different than what is on RX)  Name:  [facility]  Phone:  [phone]

## 2023-05-22 ENCOUNTER — TELEPHONE (OUTPATIENT)
Dept: FAMILY MEDICINE | Facility: CLINIC | Age: 58
End: 2023-05-22

## 2023-05-22 NOTE — TELEPHONE ENCOUNTER
Prior Authorization Retail Medication Request    Medication/Dose: Continuous Blood Gluc Sensor (DEXCOM G6 SENSOR) MISC  ICD code (if different than what is on RX):    Previously Tried and Failed:    Rationale:      Insurance Name:  na  Insurance ID:  na      Pharmacy Information (if different than what is on RX)  Name:  same  Phone:  same

## 2023-05-25 ENCOUNTER — TELEPHONE (OUTPATIENT)
Dept: FAMILY MEDICINE | Facility: CLINIC | Age: 58
End: 2023-05-25

## 2023-05-25 NOTE — TELEPHONE ENCOUNTER
PA Initiation    Medication: DEXCOM G6 TRANSMITTER MISC  Insurance Company: [employer] - Phone [phone] Fax [phone]  Pharmacy Filling the Rx: [id number] [address]  Filling Pharmacy Phone: [phone]  Filling Pharmacy Fax:    Start Date: 5/25/2023    Central Prior Authorization Team   Phone: [phone]

## 2023-05-25 NOTE — TELEPHONE ENCOUNTER
PA Initiation    Medication: DEXCOM G6 SENSOR MISC  Insurance Company: [employer] - Phone [phone] Fax [phone]  Pharmacy Filling the Rx: [id number] - [facility] [address]  Filling Pharmacy Phone: [phone]  Filling Pharmacy Fax:    Start Date: 5/25/2023    Central Prior Authorization Team   Phone: [phone]

## 2023-05-25 NOTE — TELEPHONE ENCOUNTER
PA Initiation    Medication: DEXCOM G6  KIMBERLY  Insurance Company: [employer] - Phone [phone] Fax [phone]  Pharmacy Filling the Rx: [id number] - [facility] [address]  Filling Pharmacy Phone: [phone]  Filling Pharmacy Fax:    Start Date: 5/25/2023    Central Prior Authorization Team   Phone: [phone]

## 2023-05-25 NOTE — LETTER
May 30, 2023      Amilcar Yang  [address] [facility] [address]        To Whom It May Concern:    Amilcar Yang has been seen by us for underlying health condition including type 2 diabetes and associated complication. Since he has frequent nocturnal hypoglycemia with current regimen and insulin, we strongly recommend him to use continuous glucose monitoring for precise adjustment and decreasing risk associated with hypoglycemia. Please consider allowing him to use continuous glucose monitoring device.      Sincerely,        Chris Eason MD

## 2023-05-26 NOTE — TELEPHONE ENCOUNTER
PRIOR AUTHORIZATION DENIED    Medication: DEXCOM G6  KIMBERLY  Insurance Company: [employer] - Phone [phone] Fax [phone]  Denial Date: 5/25/2023  Denial Rational:     Appeal Information:     Patient Notified: Yes

## 2023-05-26 NOTE — TELEPHONE ENCOUNTER
PRIOR AUTHORIZATION DENIED    Medication: DEXCOM G6 TRANSMITTER MISC  Insurance Company: [employer] - Phone [phone] Fax [phone]  Denial Date: 5/25/2023  Denial Rational:     Appeal Information:     Patient Notified: Yes

## 2023-05-26 NOTE — TELEPHONE ENCOUNTER
PRIOR AUTHORIZATION DENIED    Medication: DEXCOM G6 SENSOR MISC  Insurance Company: [employer] - Phone [phone] Fax [phone]  Denial Date: 5/25/2023  Denial Rational:     Appeal Information:     Patient Notified: Yes

## 2023-05-30 NOTE — TELEPHONE ENCOUNTER
Formulary exception letter is done. Routing back to [facility] pool.   David ESTEBAN, JANEEN

## 2023-06-01 NOTE — TELEPHONE ENCOUNTER
Medication Appeal Initiation    We have initiated an appeal for the requested medication:  Medication: DEXCOM G6 TRANSMITTER MISC  Appeal Start Date:  6/1/2023  Insurance Company: [facility] [employer]  Insurance Phone:   Insurance Fax:   Comments:  Faxed letter to [phone]

## 2023-06-01 NOTE — TELEPHONE ENCOUNTER
Medication Appeal Initiation    We have initiated an appeal for the requested medication:  Medication: DEXCOM G6  KIMBERLY  Appeal Start Date:  6/1/2023  Insurance Company: [employer] [employer]  Insurance Phone:   Insurance Fax:   Comments:  Faxed Appeal Letter to [phone]

## 2023-06-01 NOTE — TELEPHONE ENCOUNTER
Medication Appeal Initiation    We have initiated an appeal for the requested medication:  Medication: DEXCOM G6 SENSOR MISC  Appeal Start Date:  6/1/2023  Insurance Company: [employer]  Insurance Phone:   Insurance Fax:   Comments:  Faxed Appeal letter to [phone]

## 2023-06-13 ENCOUNTER — PATIENT OUTREACH (OUTPATIENT)
Dept: CARE COORDINATION | Facility: CLINIC | Age: 58
End: 2023-06-13

## 2023-06-13 ENCOUNTER — ALLIED HEALTH/NURSE VISIT (OUTPATIENT)
Dept: EDUCATION SERVICES | Facility: CLINIC | Age: 58
End: 2023-06-13
Payer: COMMERCIAL

## 2023-06-13 DIAGNOSIS — E11.65 UNCONTROLLED TYPE 2 DIABETES MELLITUS WITH HYPERGLYCEMIA (H): ICD-10-CM

## 2023-06-13 DIAGNOSIS — E11.65 TYPE 2 DIABETES MELLITUS WITH HYPERGLYCEMIA, WITH LONG-TERM CURRENT USE OF INSULIN (H): ICD-10-CM

## 2023-06-13 DIAGNOSIS — E11.9 NEW ONSET TYPE 2 DIABETES MELLITUS (H): ICD-10-CM

## 2023-06-13 DIAGNOSIS — Z79.4 TYPE 2 DIABETES MELLITUS WITH HYPERGLYCEMIA, WITH LONG-TERM CURRENT USE OF INSULIN (H): ICD-10-CM

## 2023-06-13 PROCEDURE — G0108 DIAB MANAGE TRN  PER INDIV: HCPCS | Performed by: DIETITIAN, REGISTERED

## 2023-06-13 RX ORDER — INSULIN GLARGINE 100 [IU]/ML
32 INJECTION, SOLUTION SUBCUTANEOUS AT BEDTIME
Qty: 15 ML | Refills: 3 | Status: SHIPPED | OUTPATIENT
Start: 2023-06-13 | End: 2023-08-21

## 2023-06-13 RX ORDER — METFORMIN HCL 500 MG
500 TABLET, EXTENDED RELEASE 24 HR ORAL
Qty: 270 TABLET | Refills: 1 | Status: SHIPPED | OUTPATIENT
Start: 2023-06-13 | End: 2023-08-21

## 2023-06-13 RX ORDER — BLOOD-GLUCOSE SENSOR
1 EACH MISCELLANEOUS
Qty: 2 EACH | Refills: 11 | Status: SHIPPED | OUTPATIENT
Start: 2023-06-13

## 2023-06-13 NOTE — TELEPHONE ENCOUNTER
MEDICATION APPEAL DENIED    Medication: DEXCOM G6 TRANSMITTER MISC  Insurance Company: [employer]  Denial Date: 6/12/2023  Denial Rational:     Second Level Appeal Information:   Patient Notified: Yes  Central Prior Authorization Team ONLY: Second level appeals will be managed by the clinic staff and provider. Please contact the [employer] Prior Authorization Team if additional information about the denial is needed.

## 2023-06-13 NOTE — PROGRESS NOTES
Diabetes Self-Management Education & Support    Presents for: Follow-up    Type of Service: In Person Visit    Assessment Type:   ASSESSMENT:  Since June 1st when patient increased Basaglar to 32 units at bedtime, he has seen 100% fasting blood glucose in target. He has also made great changes to find more whole grain foods and be active and commended Amilcar on his great changes. Encouraged him to try to check an evening blood glucose during the day to assess if blood glucose in target all day with current treatment or if a different medication may be helpful if blood glucose appears to be rising from food. Amilcar is agreeable with including some pre/post-dinner blood glucose checks. He is open to adding another Metformin-XR today- one with each meal or 1500 mg/day- since tolerating 2 metformin a day. He was cautioned more Metformin may reduce insulin needs.     Amilcar is also curious how he does with food. He was hoping the Dexcom would be approved but noticed today that Dexcom appeal was denied due to not being on a multiple daily insulin regimen. Since [employer] has a free trial program, did place prescription for this to [place].  Suspect this will also be denied for same reason as Dexcom but patient would really like to try a sensor to have more blood glucose data.    Continued AADE7 education today on self-care and reducing risks. Did not discussed heart-healthy eating and blood pressure today, but reviewed eye, foot, kidney,and dental care as well as vaccines recommended due to sickness raising blood glucose. Pt verbalized understanding of concepts discussed and recommendations provided.       Patient's most recent   Lab Results   Component Value Date    A1C 12.3 04/24/2023    A1C 5.8 07/17/2015     is not meeting goal of <7.0    Diabetes knowledge and skills assessment:   Patient is knowledgeable in diabetes management concepts related to: Healthy Eating, Being Active, Monitoring, Taking Medication and  Problem Solving    Continue education with the following diabetes management concepts: Reducing Risks and Healthy Coping    Based on learning assessment above, most appropriate setting for further diabetes education would be: Group class or Individual setting.      PLAN  -Increase Metformin to 3 tablets a day- one with each meal (1500 mg/day)  -Include some blood glucose before or after dinner.  -Carry rapid-acting glucose at all times in case of unexpected hypoglycemia.    Topics to cover at upcoming visits: Reducing Risks and Healthy Coping    Follow-up: 8/21/23    See Care Plan for co-developed, patient-state behavior change goals.  AVS provided for patient today.    Education Materials Provided:  No new materials provided today      SUBJECTIVE/OBJECTIVE:  Presents for: Follow-up  Accompanied by: Self  Diabetes education in the past 24mo: Yes  Focus of Visit: Assistance w/ making life changes  Diabetes type: Type 2  Date of diagnosis: 4/24/23  Disease course: Other  How confident are you filling out medical forms by yourself:: Extremely  Diabetes management related comments/concerns: Says daughter will be going going shopping with him and plans to move in for 2 months before she moves. Daughter eats healthier but is in good shape. Says still down 12-13 lbs- has scale at home to keep track of weight. He cut out the soda and tried Dr. Pepper Zero - was ok but now on the ICE drink. Feeling better - is not drinking as many of them.  Found different breads to try. He likes a lot of what daughter to pick out.    Trying to find healthier recipes for making chicken/fish. Says he has more energy.     Says gained 2-3 lbs in the past few weeks.    Transportation concerns: No  Other concerns:: Glasses  Cultural Influences/Ethnic Background:  Choose not to answer      Diabetes Symptoms & Complications:  Fatigue: Yes  Neuropathy: No  Polydipsia: Yes  Polyphagia: Sometimes  Polyuria: Yes  Visual change: Yes  Slow healing  "wounds: No  Complications assessed today?: Yes  Autonomic neuropathy: No  CVA: No  Heart disease: No  Nephropathy: No  Peripheral neuropathy: No  Peripheral Vascular Disease: No  Retinopathy: No  Sexual dysfunction: No    Patient Problem List and Family Medical History reviewed for relevant medical history, current medical status, and diabetes risk factors.    Vitals:  There were no vitals taken for this visit.  Estimated body mass index is 36.26 kg/m  as calculated from the following:    Height as of 5/18/23: 1.803 m (5' 11\").    Weight as of 5/18/23: 117.9 kg (260 lb).   Last 3 BP:   BP Readings from Last 3 Encounters:   05/18/23 126/76   04/25/23 130/80   04/24/23 122/78       History   Smoking Status     Former     Types: Cigarettes     Quit date: 7/17/1985   Smokeless Tobacco     Never       Labs:  Lab Results   Component Value Date    A1C 12.3 04/24/2023    A1C 5.8 07/17/2015     Lab Results   Component Value Date     04/24/2023     07/13/2022    GLC 96 06/25/2021     Lab Results   Component Value Date     07/13/2022     06/25/2021     HDL Cholesterol   Date Value Ref Range Status   06/25/2021 48 >39 mg/dL Final     Direct Measure HDL   Date Value Ref Range Status   07/13/2022 48 >=40 mg/dL Final   ]  GFR Estimate   Date Value Ref Range Status   04/24/2023 >90 >60 mL/min/1.73m2 Final     Comment:     eGFR calculated using 2021 CKD-EPI equation.   06/25/2021 74 >60 mL/min/[1.73_m2] Final     Comment:     Non  GFR Calc  Starting 12/18/2018, serum creatinine based estimated GFR (eGFR) will be   calculated using the Chronic Kidney Disease Epidemiology Collaboration   (CKD-EPI) equation.       GFR Estimate If Black   Date Value Ref Range Status   06/25/2021 86 >60 mL/min/[1.73_m2] Final     Comment:      GFR Calc  Starting 12/18/2018, serum creatinine based estimated GFR (eGFR) will be   calculated using the Chronic Kidney Disease Epidemiology " Collaboration   (CKD-EPI) equation.       Lab Results   Component Value Date    CR 0.97 04/24/2023    CR 1.11 06/25/2021     No results found for: MICROALBUMIN    Healthy Eating:  Healthy Eating Assessed Today: Yes  Cultural/Rastafarian diet restrictions?: No  Meal planning/habits: None  How many times a week on average do you eat food made away from home (restaurant/take-out)?: 2  Meals include: Breakfast, Lunch, Dinner, Evening Snack  Breakfast: cereal -1x/week OR protein drink - 0 gm CHO protein shake  Lunch: hamburger OR ham and cheese sandwich and ICE  Dinner: Meat and potatoes OR fish/chicken and pasta (processed foods) OR Frozen pizza  Snacks: sunflower seeds, almonds, cooies on occasion  Beverages: Water, Other (Cut out the 4-5 bottles of soda a day. Now drinks 1-2 ICE beverages.)    Being Active:  Being Active Assessed Today: Yes  Exercise:: Yes  Days per week of moderate to strenuous exercise (like a brisk walk): 5 (Walking dog daily and gym 2-3 days a week.)  On average, minutes per day of exercise at this level: 30 (30-90 minutes)  How intense was your typical exercise? : Moderate (like brisk walking)  Exercise Minutes per Week: 150  Barrier to exercise: Other (Last December took a break and had trouble getting back into it.)    Monitoring:  Monitoring Assessed Today: Yes  Did patient bring glucose meter to appointment? : Yes  Blood Glucose Meter: Accu-chek (Guide meter)  Times checking blood sugar at home (number): 1  Times checking blood sugar at home (per): Day  Blood glucose trend: Decreasing    Blood glucose:   106, 108, 111, 118, 128, 109, 123, 120, 121, 99, 110,   5/31 and before fasting was above target: 154, 150, 169, 138, 128, 151, 166, 149, 143, 137, 146, 160, 146, 176, 179, 175    Taking Medications:  Diabetes Medication(s)     Biguanides       metFORMIN (GLUCOPHAGE XR) 500 MG 24 hr tablet    Take 1 tablet (500 mg) by mouth 2 times daily (with meals)    Insulin       insulin glargine (BASAGLAR  KWIKPEN) 100 UNIT/ML pen    Inject 23 Units Subcutaneous At Bedtime        **He is now up to 32 units of Basaglar- at this for 2 weeks. **    Taking Medication Assessed Today: Yes  Current Treatments: Insulin Injections, Oral Medication (taken by mouth)  Dose schedule: At bedtime  Given by: Patient  Injection/Infusion sites: Abdomen  Problems taking diabetes medications regularly?: No  Diabetes medication side effects?: No    Problem Solving:  Problem Solving Assessed Today: Yes  Is the patient at risk for hypoglycemia?: Yes  Hypoglycemia Frequency: Never  Hypoglycemia Treatment:  (soda)              Reducing Risks:  Reducing Risks Assessed Today: Yes  Diabetes Risks: Age over 45 years  CAD Risks: Diabetes Mellitus, Dyslipidemia, Family history, Hypertension, Obesity, Stress  Has dilated eye exam at least once a year?: Yes  Sees dentist every 6 months?: No (Tries to go every 6 months.)  Feet checked by healthcare provider in the last year?: No    Healthy Coping:  Healthy Coping Assessed Today: Yes  Emotional response to diabetes: Ready to learn  Informal Support system:: Children, Friends  Stage of change: ACTION (Actively working towards change)  Patient Activation Measure Survey Score:       View : No data to display.                  Care Plan and Education Provided:  Care Plan: Diabetes   Updates made by Pat Fallon RD since 6/13/2023 12:00 AM      Problem: HbA1C Not In Goal       Goal: Establish Regular Follow-Ups with PCP       Task: Discuss with PCP the recommended timing for patient's next follow up visit(s) Completed 6/13/2023   Responsible User: Pat Fallon RD      Problem: Diabetes Self-Management Education Needed to Optimize Self-Care Behaviors       Goal: Healthy Eating - follow a healthy eating pattern for diabetes       Task: Provide education on weight management Completed 6/13/2023   Responsible User: Pat Fallon RD      Goal: Taking Medication - patient is consistently taking  medications as directed    This Visit's Progress: 100%   Recent Progress: 60%   Note:    Increase Basaglar to 23 units and 2 Metformin a day.     Task: Provide education on insulin and injectable diabetes medications, including administration, storage, site selection and rotation for injection sites Completed 6/13/2023   Responsible User: Pat Fallon RD      Task: Provide education on frequency and refill details of medications Completed 6/13/2023   Responsible User: Pat Fallon RD      Goal: Problem Solving - know how to prevent and manage short-term diabetes complications       Task: Provide education on when to call a health care provider    Responsible User: Pat Fallon RD      Goal: Reducing Risks - know how to prevent and treat long-term diabetes complications       Task: Provide education on major complications of diabetes, prevention, early diagnostic measures and treatment of complications Completed 6/13/2023   Responsible User: Pat Fallon RD      Task: Provide education on recommended care for dental, eye and foot health Completed 6/13/2023   Responsible User: Pat Fallon RD      Task: Provide education on Hemoglobin A1c - goals and relationship to blood glucose levels Completed 6/13/2023   Responsible User: Pat Fallon RD Kaydee Brown, RDN, LD, [facility] [place]     Time Spent: 70 minutes  Encounter Type: Individual    Any diabetes medication dose changes were made via the CDE Protocol per the patient's referring provider. A copy of this encounter was shared with the provider.

## 2023-06-13 NOTE — TELEPHONE ENCOUNTER
MEDICATION APPEAL DENIED    Medication: DEXCOM G6  KIMBERLY  Insurance Company: [employer]  Denial Date: 6/12/2023  Denial Rational:     Second Level Appeal Information:   Patient Notified: Yes  Central Prior Authorization Team ONLY: Second level appeals will be managed by the clinic staff and provider. Please contact the [facility] Prior Authorization Team if additional information about the denial is needed.

## 2023-06-13 NOTE — TELEPHONE ENCOUNTER
MEDICATION APPEAL DENIED    Medication: DEXCOM G6 SENSOR MISC  Insurance Company: [employer]  Denial Date: 6/12/2023  Denial Rational:     Second Level Appeal Information:     Patient Notified: Yes  Central Prior Authorization Team ONLY: Second level appeals will be managed by the clinic staff and provider. Please contact the [employer] Prior Authorization Team if additional information about the denial is needed.

## 2023-06-13 NOTE — LETTER
6/13/2023         RE: Amilcar Yang  [address] [facility] [address]        Dear Colleague,    Thank you for referring your patient, Amilcar Yang, to the [facility]. Please see a copy of my visit note below.    Diabetes Self-Management Education & Support    Presents for: Follow-up    Type of Service: In Person Visit    Assessment Type:   ASSESSMENT:  Since June 1st when patient increased Basaglar to 32 units at bedtime, he has seen 100% fasting blood glucose in target. He has also made great changes to find more whole grain foods and be active and commended Amilcar on his great changes. Encouraged him to try to check an evening blood glucose during the day to assess if blood glucose in target all day with current treatment or if a different medication may be helpful if blood glucose appears to be rising from food. Amilcar is agreeable with including some pre/post-dinner blood glucose checks. He is open to adding another Metformin-XR today- one with each meal or 1500 mg/day- since tolerating 2 metformin a day. He was cautioned more Metformin may reduce insulin needs.     Amilcar is also curious how he does with food. He was hoping the Dexcom would be approved but noticed today that Dexcom appeal was denied due to not being on a multiple daily insulin regimen. Since Freestyle Hector 3 has a free trial program, did place prescription for this to [employer].  Suspect this will also be denied for same reason as Dexcom but patient would really like to try a sensor to have more blood glucose data.    Continued AADE7 education today on self-care and reducing risks. Did not discussed heart-healthy eating and blood pressure today, but reviewed eye, foot, kidney,and dental care as well as vaccines recommended due to sickness raising blood glucose. Pt verbalized understanding of concepts discussed and recommendations provided.       Patient's most recent   Lab Results   Component Value Date     A1C 12.3 04/24/2023    A1C 5.8 07/17/2015     is not meeting goal of <7.0    Diabetes knowledge and skills assessment:   Patient is knowledgeable in diabetes management concepts related to: Healthy Eating, Being Active, Monitoring, Taking Medication and Problem Solving    Continue education with the following diabetes management concepts: Reducing Risks and Healthy Coping    Based on learning assessment above, most appropriate setting for further diabetes education would be: Group class or Individual setting.      PLAN  -Increase Metformin to 3 tablets a day- one with each meal (1500 mg/day)  -Include some blood glucose before or after dinner.  -Carry rapid-acting glucose at all times in case of unexpected hypoglycemia.    Topics to cover at upcoming visits: Reducing Risks and Healthy Coping    Follow-up: 8/21/23    See Care Plan for co-developed, patient-state behavior change goals.  AVS provided for patient today.    Education Materials Provided:  No new materials provided today      SUBJECTIVE/OBJECTIVE:  Presents for: Follow-up  Accompanied by: Self  Diabetes education in the past 24mo: Yes  Focus of Visit: Assistance w/ making life changes  Diabetes type: Type 2  Date of diagnosis: 4/24/23  Disease course: Other  How confident are you filling out medical forms by yourself:: Extremely  Diabetes management related comments/concerns: Says daughter will be going going shopping with him and plans to move in for 2 months before she moves. Daughter eats healthier but is in good shape. Says still down 12-13 lbs- has scale at home to keep track of weight. He cut out the soda and tried Dr. Pepper Zero - was ok but now on the ICE drink. Feeling better - is not drinking as many of them.  Found different breads to try. He likes a lot of what daughter to pick out.    Trying to find healthier recipes for making chicken/fish. Says he has more energy.     Says gained 2-3 lbs in the past few weeks.    Transportation concerns:  "No  Other concerns:: Glasses  Cultural Influences/Ethnic Background:  Choose not to answer      Diabetes Symptoms & Complications:  Fatigue: Yes  Neuropathy: No  Polydipsia: Yes  Polyphagia: Sometimes  Polyuria: Yes  Visual change: Yes  Slow healing wounds: No  Complications assessed today?: Yes  Autonomic neuropathy: No  CVA: No  Heart disease: No  Nephropathy: No  Peripheral neuropathy: No  Peripheral Vascular Disease: No  Retinopathy: No  Sexual dysfunction: No    Patient Problem List and Family Medical History reviewed for relevant medical history, current medical status, and diabetes risk factors.    Vitals:  There were no vitals taken for this visit.  Estimated body mass index is 36.26 kg/m  as calculated from the following:    Height as of 5/18/23: 1.803 m (5' 11\").    Weight as of 5/18/23: 117.9 kg (260 lb).   Last 3 BP:   BP Readings from Last 3 Encounters:   05/18/23 126/76   04/25/23 130/80   04/24/23 122/78       History   Smoking Status     Former     Types: Cigarettes     Quit date: 7/17/1985   Smokeless Tobacco     Never       Labs:  Lab Results   Component Value Date    A1C 12.3 04/24/2023    A1C 5.8 07/17/2015     Lab Results   Component Value Date     04/24/2023     07/13/2022    GLC 96 06/25/2021     Lab Results   Component Value Date     07/13/2022     06/25/2021     HDL Cholesterol   Date Value Ref Range Status   06/25/2021 48 >39 mg/dL Final     Direct Measure HDL   Date Value Ref Range Status   07/13/2022 48 >=40 mg/dL Final   ]  GFR Estimate   Date Value Ref Range Status   04/24/2023 >90 >60 mL/min/1.73m2 Final     Comment:     eGFR calculated using 2021 CKD-EPI equation.   06/25/2021 74 >60 mL/min/[1.73_m2] Final     Comment:     Non  GFR Calc  Starting 12/18/2018, serum creatinine based estimated GFR (eGFR) will be   calculated using the Chronic Kidney Disease Epidemiology Collaboration   (CKD-EPI) equation.       GFR Estimate If Black   Date " Value Ref Range Status   06/25/2021 86 >60 mL/min/[1.73_m2] Final     Comment:      GFR Calc  Starting 12/18/2018, serum creatinine based estimated GFR (eGFR) will be   calculated using the Chronic Kidney Disease Epidemiology Collaboration   (CKD-EPI) equation.       Lab Results   Component Value Date    CR 0.97 04/24/2023    CR 1.11 06/25/2021     No results found for: MICROALBUMIN    Healthy Eating:  Healthy Eating Assessed Today: Yes  Cultural/Mu-ism diet restrictions?: No  Meal planning/habits: None  How many times a week on average do you eat food made away from home (restaurant/take-out)?: 2  Meals include: Breakfast, Lunch, Dinner, Evening Snack  Breakfast: cereal -1x/week OR protein drink - 0 gm CHO protein shake  Lunch: hamburger OR ham and cheese sandwich and ICE  Dinner: Meat and potatoes OR fish/chicken and pasta (processed foods) OR Frozen pizza  Snacks: sunflower seeds, almonds, cooies on occasion  Beverages: Water, Other (Cut out the 4-5 bottles of soda a day. Now drinks 1-2 ICE beverages.)    Being Active:  Being Active Assessed Today: Yes  Exercise:: Yes  Days per week of moderate to strenuous exercise (like a brisk walk): 5 (Walking dog daily and gym 2-3 days a week.)  On average, minutes per day of exercise at this level: 30 (30-90 minutes)  How intense was your typical exercise? : Moderate (like brisk walking)  Exercise Minutes per Week: 150  Barrier to exercise: Other (Last December took a break and had trouble getting back into it.)    Monitoring:  Monitoring Assessed Today: Yes  Did patient bring glucose meter to appointment? : Yes  Blood Glucose Meter: Accu-chek (Guide meter)  Times checking blood sugar at home (number): 1  Times checking blood sugar at home (per): Day  Blood glucose trend: Decreasing    Blood glucose:   106, 108, 111, 118, 128, 109, 123, 120, 121, 99, 110,   5/31 and before fasting was above target: 154, 150, 169, 138, 128, 151, 166, 149, 143, 137, 146,  160, 146, 176, 179, 175    Taking Medications:  Diabetes Medication(s)     Biguanides       metFORMIN (GLUCOPHAGE XR) 500 MG 24 hr tablet    Take 1 tablet (500 mg) by mouth 2 times daily (with meals)    Insulin       insulin glargine (BASAGLAR KWIKPEN) 100 UNIT/ML pen    Inject 23 Units Subcutaneous At Bedtime        **He is now up to 32 units of Basaglar- at this for 2 weeks. **    Taking Medication Assessed Today: Yes  Current Treatments: Insulin Injections, Oral Medication (taken by mouth)  Dose schedule: At bedtime  Given by: Patient  Injection/Infusion sites: Abdomen  Problems taking diabetes medications regularly?: No  Diabetes medication side effects?: No    Problem Solving:  Problem Solving Assessed Today: Yes  Is the patient at risk for hypoglycemia?: Yes  Hypoglycemia Frequency: Never  Hypoglycemia Treatment:  (soda)              Reducing Risks:  Reducing Risks Assessed Today: Yes  Diabetes Risks: Age over 45 years  CAD Risks: Diabetes Mellitus, Dyslipidemia, Family history, Hypertension, Obesity, Stress  Has dilated eye exam at least once a year?: Yes  Sees dentist every 6 months?: No (Tries to go every 6 months.)  Feet checked by healthcare provider in the last year?: No    Healthy Coping:  Healthy Coping Assessed Today: Yes  Emotional response to diabetes: Ready to learn  Informal Support system:: Children, Friends  Stage of change: ACTION (Actively working towards change)  Patient Activation Measure Survey Score:       View : No data to display.                  Care Plan and Education Provided:  Care Plan: Diabetes   Updates made by Pat Fallon RD since 6/13/2023 12:00 AM      Problem: HbA1C Not In Goal       Goal: Establish Regular Follow-Ups with PCP       Task: Discuss with PCP the recommended timing for patient's next follow up visit(s) Completed 6/13/2023   Responsible User: Pat Fallon RD      Problem: Diabetes Self-Management Education Needed to Optimize Self-Care Behaviors        Goal: Healthy Eating - follow a healthy eating pattern for diabetes       Task: Provide education on weight management Completed 6/13/2023   Responsible User: Pat Fallon RD      Goal: Taking Medication - patient is consistently taking medications as directed    This Visit's Progress: 100%   Recent Progress: 60%   Note:    Increase Basaglar to 23 units and 2 Metformin a day.     Task: Provide education on insulin and injectable diabetes medications, including administration, storage, site selection and rotation for injection sites Completed 6/13/2023   Responsible User: Pat Fallon RD      Task: Provide education on frequency and refill details of medications Completed 6/13/2023   Responsible User: Pat Faloln RD      Goal: Problem Solving - know how to prevent and manage short-term diabetes complications       Task: Provide education on when to call a health care provider    Responsible User: Pat Fallon RD      Goal: Reducing Risks - know how to prevent and treat long-term diabetes complications       Task: Provide education on major complications of diabetes, prevention, early diagnostic measures and treatment of complications Completed 6/13/2023   Responsible User: Pat Fallon RD      Task: Provide education on recommended care for dental, eye and foot health Completed 6/13/2023   Responsible User: Pat Fallon RD      Task: Provide education on Hemoglobin A1c - goals and relationship to blood glucose levels Completed 6/13/2023   Responsible User: Pat Fallon RD Kaydee Brown, RDN, CARLOS, [facility]     Time Spent: 70 minutes  Encounter Type: Individual    Any diabetes medication dose changes were made via the CDE Protocol per the patient's referring provider. A copy of this encounter was shared with the provider.

## 2023-06-13 NOTE — PATIENT INSTRUCTIONS
Doing a great job being active and finding healthier foods.     2. Increase Metformin to 3 tablets a day (1 tablet with each meal)    3. With more Metformin, if you see before meal blood glucose dropping into the 80's, lower Basaglar by 2 units.     4. While waiting for the Hector 3, try to check blood glucose either before dinner or 2 hours after dinner.   Before meals:  mg/dL  2 hours after start of meals: 100-180 mg/    5. Check out the Hector 3 option online (https://www.freestyle.abbott/us-en/myfreestyle.html) to see if you can get a free sensor.     6. Always have a rapid-acting sugar source nearby and with you (driving or while walking/working out) in case blood glucose drop unexpectedly.     FOLLOW UP APPOINTMENT: Video visit follow up on Monday, August 21st at 1pm.    Pat Fallon RDN, LD, [facility]   [phone]

## 2023-06-22 NOTE — TELEPHONE ENCOUNTER
Even provider medical reasoning appeal was declined my his insurance frustratingly~! They only allow pt currently on insulin pump or on multidose meal time insulin(3 or more per day). There is no way I can appeal on their strict guideline for now. Please let him know he should be on finger prick monitoring device unfortunately      sameerx

## 2023-06-22 NOTE — TELEPHONE ENCOUNTER
Dr. Eason, please see medication appeal denial below, thank you!    Josselyn Gipson,  [facility]

## 2023-06-22 NOTE — TELEPHONE ENCOUNTER
Called pt to relay message from Dr. Eason. Pt states that he met with MTM (Pat Fallon from [facility]) who submitted request for Hector 3 which was accepted by insurance. Pt states that he will reach out to Pat for his testing supply needs.    Josselyn Gipson,  [facility]

## 2023-06-27 ENCOUNTER — PATIENT OUTREACH (OUTPATIENT)
Dept: CARE COORDINATION | Facility: CLINIC | Age: 58
End: 2023-06-27
Payer: COMMERCIAL

## 2023-07-20 ENCOUNTER — MYC MEDICAL ADVICE (OUTPATIENT)
Dept: FAMILY MEDICINE | Facility: CLINIC | Age: 58
End: 2023-07-20
Payer: COMMERCIAL

## 2023-07-20 NOTE — TELEPHONE ENCOUNTER
I am more than happy to help. However, the previous document wasn't filled by me. I don't know how to fill this new one to maximize his benefit out of it. Please have him to send me the previous documents denied by his employer, which I can find a point should be corrected       thx

## 2023-07-20 NOTE — TELEPHONE ENCOUNTER
Please see mychart from patient and advise appropriate course of action.      Manuel Almeida RN  [facility] Triage Nurse

## 2023-07-21 NOTE — TELEPHONE ENCOUNTER
I don't specify a reasoning on work excuse letter.     However, the new letter he wants me to fill is for disability confirmation to excuse from certain job position. It means we need more detail directly from pt. Please have him to do VV with me to fill the form and maximize his benefit.      Thanks,

## 2023-07-25 NOTE — TELEPHONE ENCOUNTER
Spoke to pt, helped schedule VV with Dr. Eason for 7/27/23.    Estefanía Henderson RN on 7/25/2023 at 5:48 PM

## 2023-07-27 ENCOUNTER — VIRTUAL VISIT (OUTPATIENT)
Dept: FAMILY MEDICINE | Facility: CLINIC | Age: 58
End: 2023-07-27
Payer: COMMERCIAL

## 2023-07-27 DIAGNOSIS — F41.9 ANXIETY: ICD-10-CM

## 2023-07-27 DIAGNOSIS — I10 BENIGN ESSENTIAL HYPERTENSION: Primary | ICD-10-CM

## 2023-07-27 PROCEDURE — 99443 PR PHYSICIAN TELEPHONE EVALUATION 21-30 MIN: CPT | Performed by: FAMILY MEDICINE

## 2023-07-27 ASSESSMENT — ANXIETY QUESTIONNAIRES
7. FEELING AFRAID AS IF SOMETHING AWFUL MIGHT HAPPEN: NOT AT ALL
2. NOT BEING ABLE TO STOP OR CONTROL WORRYING: SEVERAL DAYS
1. FEELING NERVOUS, ANXIOUS, OR ON EDGE: SEVERAL DAYS
GAD7 TOTAL SCORE: 6
GAD7 TOTAL SCORE: 6
3. WORRYING TOO MUCH ABOUT DIFFERENT THINGS: SEVERAL DAYS
4. TROUBLE RELAXING: SEVERAL DAYS
IF YOU CHECKED OFF ANY PROBLEMS ON THIS QUESTIONNAIRE, HOW DIFFICULT HAVE THESE PROBLEMS MADE IT FOR YOU TO DO YOUR WORK, TAKE CARE OF THINGS AT HOME, OR GET ALONG WITH OTHER PEOPLE: VERY DIFFICULT
5. BEING SO RESTLESS THAT IT IS HARD TO SIT STILL: SEVERAL DAYS
6. BECOMING EASILY ANNOYED OR IRRITABLE: SEVERAL DAYS

## 2023-07-27 NOTE — PROGRESS NOTES
Amilcar is a 58 year old who is being evaluated via a billable telephone visit.      What phone number would you like to be contacted at? [phone]  How would you like to obtain your AVS? Lashaun    Distant Location (provider location):  On-site    Assessment & Plan     Benign essential hypertension  stable    Anxiety  Worsening, not capable to work at having panic attack, will help him to fill the work excuse form and recheck in 6 months            FUTURE APPOINTMENTS:       - Follow-up visit in 2 months for CPE    Chris Eason MD  [facility]   Amilcar is a 58 year old, presenting for the following health issues:  Forms (Pt. Work HR denied request for pt. To work at home.  They sent him a new form to be completed by next week.)        5/18/2023     2:43 PM   Additional Questions   Roomed by Laney SPRING       History of Present Illness       Mental Health Follow-up:  Patient presents to follow-up on Anxiety.    Patient's anxiety since last visit has been:  Medium  The patient is not having other symptoms associated with anxiety.  Any significant life events: No  Patient is feeling anxious or having panic attacks.  Patient has no concerns about alcohol or drug use.    He eats 0-1 servings of fruits and vegetables daily.He consumes 1 sweetened beverage(s) daily.He exercises with enough effort to increase his heart rate 30 to 60 minutes per day.  He exercises with enough effort to increase his heart rate 4 days per week.   He is taking medications regularly.       Anxiety Follow-Up  How are you doing with your anxiety since your last visit? 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 Yes:     Have you had a significant life event? Job Concerns   Are you feeling depressed? No  Do you have any concerns with your use of alcohol or other drugs? No    Social History     Tobacco Use    Smoking status:  Former     Types: Cigarettes     Quit date: 1985     Years since quittin.0    Smokeless tobacco: Never   Vaping Use    Vaping Use: Never used   Substance Use Topics    Alcohol use: Yes    Drug use: No         2021     2:51 PM 2023     6:50 PM 2023     1:08 PM   NATALIE-7 SCORE   Total Score  2 (minimal anxiety) 6 (mild anxiety)   Total Score 11 2 6         2021     2:51 PM   PHQ   PHQ-9 Total Score 9   Q9: Thoughts of better off dead/self-harm past 2 weeks Not at all         2021     2:51 PM   Last PHQ-9   1.  Little interest or pleasure in doing things 0   2.  Feeling down, depressed, or hopeless 0   3.  Trouble falling or staying asleep, or sleeping too much 3   4.  Feeling tired or having little energy 2   5.  Poor appetite or overeating 2   6.  Feeling bad about yourself 0   7.  Trouble concentrating 2   8.  Moving slowly or restless 0   Q9: Thoughts of better off dead/self-harm past 2 weeks 0   PHQ-9 Total Score 9   Difficulty at work, home, or with people Very difficult           Review of Systems   Constitutional, HEENT, cardiovascular, pulmonary, gi and gu systems are negative, except as otherwise noted.      Objective           Vitals:  No vitals were obtained today due to virtual visit.    Physical Exam   healthy, alert, and no distress  PSYCH: Alert and oriented times 3; coherent speech, normal   rate and volume, able to articulate logical thoughts, able   to abstract reason, no tangential thoughts, no hallucinations   or delusions  His affect is normal  RESP: No cough, no audible wheezing, able to talk in full sentences  Remainder of exam unable to be completed due to telephone visits                Phone call duration: 26 minutes

## 2023-08-03 ENCOUNTER — TELEPHONE (OUTPATIENT)
Dept: FAMILY MEDICINE | Facility: CLINIC | Age: 58
End: 2023-08-03
Payer: COMMERCIAL

## 2023-08-03 NOTE — TELEPHONE ENCOUNTER
Form was left at  for patient to .  No fax number is listed  Sent to [facility]  And copy is in Team 3. Under 8/03.

## 2023-08-21 ENCOUNTER — VIRTUAL VISIT (OUTPATIENT)
Dept: EDUCATION SERVICES | Facility: CLINIC | Age: 58
End: 2023-08-21
Payer: COMMERCIAL

## 2023-08-21 DIAGNOSIS — Z79.4 TYPE 2 DIABETES MELLITUS WITH HYPERGLYCEMIA, WITH LONG-TERM CURRENT USE OF INSULIN (H): ICD-10-CM

## 2023-08-21 DIAGNOSIS — E11.65 TYPE 2 DIABETES MELLITUS WITH HYPERGLYCEMIA, WITH LONG-TERM CURRENT USE OF INSULIN (H): ICD-10-CM

## 2023-08-21 DIAGNOSIS — E11.65 UNCONTROLLED TYPE 2 DIABETES MELLITUS WITH HYPERGLYCEMIA (H): ICD-10-CM

## 2023-08-21 DIAGNOSIS — E11.9 NEW ONSET TYPE 2 DIABETES MELLITUS (H): ICD-10-CM

## 2023-08-21 PROCEDURE — G0108 DIAB MANAGE TRN  PER INDIV: HCPCS | Mod: 95 | Performed by: DIETITIAN, REGISTERED

## 2023-08-21 RX ORDER — INSULIN GLARGINE 100 [IU]/ML
24 INJECTION, SOLUTION SUBCUTANEOUS AT BEDTIME
Qty: 15 ML | Refills: 3 | Status: SHIPPED | OUTPATIENT
Start: 2023-08-21 | End: 2023-11-22

## 2023-08-21 RX ORDER — METFORMIN HCL 500 MG
TABLET, EXTENDED RELEASE 24 HR ORAL
Qty: 270 TABLET | Refills: 1 | Status: SHIPPED | OUTPATIENT
Start: 2023-08-21 | End: 2023-11-22

## 2023-08-21 NOTE — PATIENT INSTRUCTIONS
I reordered the Metformin prescription to reflect taking 3 tablets a day.  It is now written to take 2 tablets at breakfast and 1 tablet at dinner, in case they did not like how it was set up, but ok to continue to take 1 tablet with each meal as you are doing.    2. No change to medication amounts today.  Sounds like you are seeing great blood glucose control- keep up the good work!  -I did adjust the dose of Lantus to 24 units since you are down to this.  Hopefully [facility] will reach out less with this update.    3. Being sick may raise blood glucose.   Good idea to check blood glucose more often when you are not feeling well, continue to take diabetes medications as directed, maintain high fluid intake and eat regular meals (some carbohydrates throughout the day). If you are vomiting or seeing blood glucose >240 mg/dL for more than a day, contact your provider.    4. Stress can also raise your blood glucose. Good to find ways to de-stress (talk with others, go for a walk, deep breathing, watch a funny video, etc) and keep an eye on blood glucose.     5. Make sure to keep your medications and testing supplies with you in your carry on when you fly. If you are wearing a sensor, you want to avoid the body scanner- may need to let TSA know you are wearing and they may have to pat you down instead (or wait to place/wear the sensor until you get to your destination).    Helpful resource if have any questions on diabetes: American Diabetes Association (www.diabetes.org). Check out Diabetes Food Hub for recipe ideas (https://www.diabetesfoodhub.org)     FOLLOW UP: Please reach out with any questions or call if you are struggling or feel like a review would be helpful.    Pat Fallon RDN, LD, [facility]   [phone]

## 2023-08-21 NOTE — PROGRESS NOTES
Diabetes Self-Management Education & Support    Presents for: Follow-up    Type of service:  Video Visit    If the video visit is dropped, the video visit invitation should be resent by: Text to cell phone: [phone]    Originating Location (pt. Location): Home  Distant Location (provider location): Offsite  Mode of Communication:  Video Conference via [employer]    Video Start Time:  1:01pm  Video End Time (time video stopped): 1:40pm    How would patient like to obtain AVS? MyChart    Assessment Type:   ASSESSMENT:  Amilcar indicates he is seeing good blood glucose control after verifying range for fasting blood glucose to be  mg/dL, and says other than today, he has normally been 110-120's in the morning with 24 units of Basaglar.  He says he is now out of Metformin and having refill issues.  Tried to reorder this for him and worried having it written as TID, since taking 1 tablet with each meal, is causing trouble since normally BID, so adjusted it to 2 tablets AM and 1 tablet PM. Informed patient of prescription verbiage change but ok to continue taking 500 mg Metformin-XR with each meal.  Will also adjust Basaglar medication at 32 units to reflect current dose;of 24 units before bed.     Amilcar denies any hypoglycemia. He does feel off when blood glucose high, usually from too much or wrong foods. Daughter was helpful in finding some healthier food choices he enjoys with protein shake and higher fiber bread. He is hoping to start wearing the [employer]ector 3 again so he can try to find what meals/food work better for him. Noticed improved fasting blood glucose when he would go to the gym. He is staying active but helping with house projects.  Did offer to continue to work Metformin to 4 tablets a day to help try to lower insulin needs further, but Amilcar is happy with regimen and would like to keep everything as is.     Finished AADE7 today on heart-healthy eating, sick day management, travel, stress management,  and coping. Pt verbalized understanding of concepts discussed and recommendations provided.       Patient's most recent   Lab Results   Component Value Date    A1C 12.3 04/24/2023    A1C 5.8 07/17/2015     is not meeting goal of <7.0    Diabetes knowledge and skills assessment:   Patient is knowledgeable in diabetes management concepts related to: Healthy Eating, Being Active, Monitoring, Taking Medication, Problem Solving, and Reducing Risks    Continue education with the following diabetes management concepts: Problem Solving and Healthy Coping    Based on learning assessment above, most appropriate setting for further diabetes education would be: Group class or Individual setting.      PLAN    -No change to current diabetes medications other than update on Lantus dose and reordering of Metformin since patient is out.    Topics to cover at upcoming visits: Problem Solving    Follow-up: PRN. Patient feels he has the information he needs. Will call if follow up desired in winter. Follow up with PCP in September.    See Care Plan for co-developed, patient-state behavior change goals.  AVS provided for patient today.    Education Materials Provided:  No new materials provided today      SUBJECTIVE/OBJECTIVE:  Presents for: Follow-up  Accompanied by: Self  Diabetes education in the past 24mo: Yes  Focus of Visit: Assistance w/ making life changes  Diabetes type: Type 2  Date of diagnosis: 4/24/23  Disease course: Other  How confident are you filling out medical forms by yourself:: Extremely  Diabetes management related comments/concerns: Says has been stressful at work.  Says he ran out of Metformin and having trouble getting it refilled. Wanted to know BG range before meals.    Says he was taking 32 units of insulin, now down to 24 units. Says blood glucose usually below 130 mg/dL. Says averaging 118-122 mg/dL.     Says Dexcom was not approved by his insurance. Needs to see if his insurance will cover this.     Says  "sometimes checking after meals to see how high blood glucose was running.  Says already starting to feel the difference in food and when ate more food. Says needs to start carrying a snack when working since he would forget to eat.    Daughter now moved out. Daughter helped him with diet and helped him with food choices. Says has a lot of side jobs that he has to finish.    Says if has too much sugar and too many carbs,feels more blah. Says not have trouble with soda issue and changed to bread.     Says lost 10-12 pounds from initial weight when he was diagnosed. Says morning after a workout, saw BG 90-95 mg/dL. 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  Ok.     Says has been doing more reading on it and daughter is helping him. Would like to keep meds where it is - only spending $30-40 a month. Wants to see what he can do to help reduce insulin needs, which is why he will check into the Hector again to see if he can make it work.      Transportation concerns: No  Difficulty affording diabetes medication?: No  Difficulty affording diabetes testing supplies?: No  Other concerns:: Glasses  Cultural Influences/Ethnic Background:  Choose not to answer    Diabetes Symptoms & Complications:  Fatigue: Yes  Neuropathy: No  Polydipsia: Yes  Polyphagia: Sometimes  Polyuria: Yes  Visual change: Yes  Slow healing wounds: No  Complications assessed today?: Yes  Autonomic neuropathy: No  CVA: No  Heart disease: No  Nephropathy: No  Peripheral neuropathy: No  Peripheral Vascular Disease: No  Retinopathy: No  Sexual dysfunction: No    Patient Problem List and Family Medical History reviewed for relevant medical history, current medical status, and diabetes risk factors.    Vitals:  There were no vitals taken for this visit.  Estimated body mass index is 36.26 kg/m  as calculated from the following:    Height as of 5/18/23: 1.803 m (5' 11\").    Weight as of 5/18/23: 117.9 kg (260 lb).   Last 3 BP:   BP Readings from Last 3 " Encounters:   05/18/23 126/76   04/25/23 130/80   04/24/23 122/78       History   Smoking Status    Former    Types: Cigarettes    Quit date: 7/17/1985   Smokeless Tobacco    Never       Labs:  Lab Results   Component Value Date    A1C 12.3 04/24/2023    A1C 5.8 07/17/2015     Lab Results   Component Value Date     04/24/2023     07/13/2022    GLC 96 06/25/2021     Lab Results   Component Value Date     07/13/2022     06/25/2021     HDL Cholesterol   Date Value Ref Range Status   06/25/2021 48 >39 mg/dL Final     Direct Measure HDL   Date Value Ref Range Status   07/13/2022 48 >=40 mg/dL Final   ]  GFR Estimate   Date Value Ref Range Status   04/24/2023 >90 >60 mL/min/1.73m2 Final     Comment:     eGFR calculated using 2021 CKD-EPI equation.   06/25/2021 74 >60 mL/min/[1.73_m2] Final     Comment:     Non  GFR Calc  Starting 12/18/2018, serum creatinine based estimated GFR (eGFR) will be   calculated using the Chronic Kidney Disease Epidemiology Collaboration   (CKD-EPI) equation.       GFR Estimate If Black   Date Value Ref Range Status   06/25/2021 86 >60 mL/min/[1.73_m2] Final     Comment:      GFR Calc  Starting 12/18/2018, serum creatinine based estimated GFR (eGFR) will be   calculated using the Chronic Kidney Disease Epidemiology Collaboration   (CKD-EPI) equation.       Lab Results   Component Value Date    CR 0.97 04/24/2023    CR 1.11 06/25/2021     No results found for: MICROALBUMIN    Healthy Eating:  Healthy Eating Assessed Today: Yes  Cultural/Advent diet restrictions?: No  Meal planning/habits: Smaller portions  How many times a week on average do you eat food made away from home (restaurant/take-out)?: 2  Meals include: Breakfast, Lunch, Dinner, Evening Snack  Breakfast: protein drink OR eggs/fish  Lunch: hamburger OR ham and cheese sandwich and ICE  Dinner: Meat and potatoes OR fish/chicken and pasta (processed foods) OR Frozen  pizza  Snacks: sunflower seeds, almonds, cooies on occasion  Beverages: Water, Other (Cut out the 4-5 bottles of soda a day. Now drinks 1-2 ICE beverages -lasts all day. Also found a SF beverage.)    Being Active:  Being Active Assessed Today: Yes  Exercise:: Yes (walking 2-3.5 miles a day.)  Days per week of moderate to strenuous exercise (like a brisk walk): 5 (Walking dog daily and gym 2-3 days a week.)  On average, minutes per day of exercise at this level: 30 (30-90 minutes)  How intense was your typical exercise? : Moderate (like brisk walking)  Exercise Minutes per Week: 150  Barrier to exercise: Other (Last December took a break and had trouble getting back into it.)    Monitoring:  Monitoring Assessed Today: Yes  Did patient bring glucose meter to appointment? : Yes  Blood Glucose Meter: Accu-chek (Guide meter)  Times checking blood sugar at home (number): 1  Times checking blood sugar at home (per): Day  Blood glucose trend: Decreasing    Blood glucose: not provided    Taking Medications:  Diabetes Medication(s)       Biguanides       metFORMIN (GLUCOPHAGE XR) 500 MG 24 hr tablet    Take 2 tablets (1000 mg) by mouth with breakfast and 1 tablet (500 mg) with dinner.      Insulin       insulin glargine (BASAGLAR KWIKPEN) 100 UNIT/ML pen    Inject 32 Units Subcutaneous At Bedtime            Taking Medication Assessed Today: Yes  Current Treatments: Insulin Injections, Oral Medication (taken by mouth)  Dose schedule: At bedtime  Given by: Patient  Injection/Infusion sites: Abdomen  Problems taking diabetes medications regularly?: No  Diabetes medication side effects?: No    Problem Solving:  Problem Solving Assessed Today: Yes  Is the patient at risk for hypoglycemia?: Yes  Hypoglycemia Frequency: Never  Hypoglycemia Treatment:  (soda)              Reducing Risks:  Reducing Risks Assessed Today: Yes  Diabetes Risks: Age over 45 years  CAD Risks: Diabetes Mellitus, Dyslipidemia, Family history, Hypertension,  Obesity, Stress  Has dilated eye exam at least once a year?: Yes  Sees dentist every 6 months?: No (Tries to go every 6 months.)  Feet checked by healthcare provider in the last year?: No    Healthy Coping:  Healthy Coping Assessed Today: Yes  Emotional response to diabetes: Ready to learn, Acceptance (Says feels better now with diagnosis than when he was first diagnosed.)  Informal Support system:: Children, Friends  Stage of change: ACTION (Actively working towards change)  Patient Activation Measure Survey Score:       No data to display                  Care Plan and Education Provided:  Care Plan: Diabetes   Updates made by Pat Fallon RD since 8/21/2023 12:00 AM        Problem: HbA1C Not In Goal         Goal: Get HbA1C Level in Goal         Task: Educate patient on diabetes education self-management topics Completed 8/21/2023   Responsible User: Pat Fallon RD        Problem: Diabetes Self-Management Education Needed to Optimize Self-Care Behaviors         Goal: Healthy Eating - follow a healthy eating pattern for diabetes         Task: Provide education on heart healthy eating Completed 8/21/2023   Responsible User: Pat Fallon RD        Goal: Monitoring - monitor glucose and ketones as directed         Task: Provide education on continuous glucose monitoring (sensor placement, use of concha or /reader, understanding glucose trends, alerts and alarms, differences between sensor glucose and blood glucose) Completed 8/21/2023   Responsible User: Pat Fallon RD        Goal: Taking Medication - patient is consistently taking medications as directed    This Visit's Progress: 100%   Recent Progress: 100%   Note:    Increase Basaglar to 23 units and 2 Metformin a day.       Goal: Problem Solving - know how to prevent and manage short-term diabetes complications         Task: Provide education on safe travel with diabetes Completed 8/21/2023   Responsible User: Pat Fallon RD         Task: Provide education on how to care for diabetes on sick days Completed 8/21/2023   Responsible User: Pat Fallon RD        Task: Provide education on when to call a health care provider Completed 8/21/2023   Responsible User: Pat Fallon RD        Goal: Reducing Risks - know how to prevent and treat long-term diabetes complications         Task: Provide education on recommendations for heart health - lipid levels and goals, blood pressure and goals, and aspirin therapy, if indicated    Responsible User: Pat Fallon RD   Note:    Reviewed heart-healthy eating       Goal: Healthy Coping - use available resources to cope with the challenges of managing diabetes         Task: Discuss recognizing feelings about having diabetes Completed 8/21/2023   Responsible User: Pat Fallon RD        Task: Discuss methods for coping with stress Completed 8/21/2023   Responsible User: Pat Fallon RD Kaydee Brown, RDN, LD, [facility]     Time Spent: 39 minutes  Encounter Type: Individual    Any diabetes medication dose changes were made via the CDE Protocol per the patient's referring provider. A copy of this encounter was shared with the provider.

## 2023-08-21 NOTE — LETTER
8/21/2023         RE: Amilcar Yang  [address] [facility] [id number]        Dear Colleague,    Thank you for referring your patient, Amilcar Yang, to the [facility]. Please see a copy of my visit note below.    Diabetes Self-Management Education & Support    Presents for: Follow-up    Type of service:  Video Visit    If the video visit is dropped, the video visit invitation should be resent by: Text to cell phone: [phone]    Originating Location (pt. Location): Home  Distant Location (provider location): Offsite  Mode of Communication:  Video Conference via pic5    Video Start Time:  1:01pm  Video End Time (time video stopped): 1:40pm    How would patient like to obtain AVS? MyChart    Assessment Type:   ASSESSMENT:  Amilcar indicates he is seeing good blood glucose control after verifying range for fasting blood glucose to be  mg/dL, and says other than today, he has normally been 110-120's in the morning with 24 units of Basaglar.  He says he is now out of Metformin and having refill issues.  Tried to reorder this for him and worried having it written as TID, since taking 1 tablet with each meal, is causing trouble since normally BID, so adjusted it to 2 tablets AM and 1 tablet PM. Informed patient of prescription verbiage change but ok to continue taking 500 mg Metformin-XR with each meal.  Will also adjust Basaglar medication at 32 units to reflect current dose;of 24 units before bed.     Amilcar denies any hypoglycemia. He does feel off when blood glucose high, usually from too much or wrong foods. Daughter was helpful in finding some healthier food choices he enjoys with protein shake and higher fiber bread. He is hoping to start wearing the Hector 3 again so he can try to find what meals/food work better for him. Noticed improved fasting blood glucose when he would go to the gym. He is staying active but helping with house projects.  Did offer to  continue to work Metformin to 4 tablets a day to help try to lower insulin needs further, but Amilcar is happy with regimen and would like to keep everything as is.     Finished AADE7 today on heart-healthy eating, sick day management, travel, stress management, and coping. Pt verbalized understanding of concepts discussed and recommendations provided.       Patient's most recent   Lab Results   Component Value Date    A1C 12.3 04/24/2023    A1C 5.8 07/17/2015     is not meeting goal of <7.0    Diabetes knowledge and skills assessment:   Patient is knowledgeable in diabetes management concepts related to: Healthy Eating, Being Active, Monitoring, Taking Medication, Problem Solving, and Reducing Risks    Continue education with the following diabetes management concepts: Problem Solving and Healthy Coping    Based on learning assessment above, most appropriate setting for further diabetes education would be: Group class or Individual setting.      PLAN    -No change to current diabetes medications other than update on Lantus dose and reordering of Metformin since patient is out.    Topics to cover at upcoming visits: Problem Solving    Follow-up: PRN. Patient feels he has the information he needs. Will call if follow up desired in winter. Follow up with PCP in September.    See Care Plan for co-developed, patient-state behavior change goals.  AVS provided for patient today.    Education Materials Provided:  No new materials provided today      SUBJECTIVE/OBJECTIVE:  Presents for: Follow-up  Accompanied by: Self  Diabetes education in the past 24mo: Yes  Focus of Visit: Assistance w/ making life changes  Diabetes type: Type 2  Date of diagnosis: 4/24/23  Disease course: Other  How confident are you filling out medical forms by yourself:: Extremely  Diabetes management related comments/concerns: Says has been stressful at work.  Says he ran out of Metformin and having trouble getting it refilled. Wanted to know BG range  before meals.    Says he was taking 32 units of insulin, now down to 24 units. Says blood glucose usually below 130 mg/dL. Says averaging 118-122 mg/dL.     Says Dexcom was not approved by his insurance. Needs to see if his insurance will cover this.     Says sometimes checking after meals to see how high blood glucose was running.  Says already starting to feel the difference in food and when ate more food. Says needs to start carrying a snack when working since he would forget to eat.    Daughter now moved out. Daughter helped him with diet and helped him with food choices. Says has a lot of side jobs that he has to finish.    Says if has too much sugar and too many carbs,feels more blah. Says not have trouble with soda issue and changed to bread.     Says lost 10-12 pounds from initial weight when he was diagnosed. Says morning after a workout, saw BG 90-95 mg/dL. 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  Ok.     Says has been doing more reading on it and daughter is helping him. Would like to keep meds where it is - only spending $30-40 a month. Wants to see what he can do to help reduce insulin needs, which is why he will check into the Hector again to see if he can make it work.      Transportation concerns: No  Difficulty affording diabetes medication?: No  Difficulty affording diabetes testing supplies?: No  Other concerns:: Glasses  Cultural Influences/Ethnic Background:  Choose not to answer    Diabetes Symptoms & Complications:  Fatigue: Yes  Neuropathy: No  Polydipsia: Yes  Polyphagia: Sometimes  Polyuria: Yes  Visual change: Yes  Slow healing wounds: No  Complications assessed today?: Yes  Autonomic neuropathy: No  CVA: No  Heart disease: No  Nephropathy: No  Peripheral neuropathy: No  Peripheral Vascular Disease: No  Retinopathy: No  Sexual dysfunction: No    Patient Problem List and Family Medical History reviewed for relevant medical history, current medical status, and diabetes risk  "factors.    Vitals:  There were no vitals taken for this visit.  Estimated body mass index is 36.26 kg/m  as calculated from the following:    Height as of 5/18/23: 1.803 m (5' 11\").    Weight as of 5/18/23: 117.9 kg (260 lb).   Last 3 BP:   BP Readings from Last 3 Encounters:   05/18/23 126/76   04/25/23 130/80   04/24/23 122/78       History   Smoking Status     Former     Types: Cigarettes     Quit date: 7/17/1985   Smokeless Tobacco     Never       Labs:  Lab Results   Component Value Date    A1C 12.3 04/24/2023    A1C 5.8 07/17/2015     Lab Results   Component Value Date     04/24/2023     07/13/2022    GLC 96 06/25/2021     Lab Results   Component Value Date     07/13/2022     06/25/2021     HDL Cholesterol   Date Value Ref Range Status   06/25/2021 48 >39 mg/dL Final     Direct Measure HDL   Date Value Ref Range Status   07/13/2022 48 >=40 mg/dL Final   ]  GFR Estimate   Date Value Ref Range Status   04/24/2023 >90 >60 mL/min/1.73m2 Final     Comment:     eGFR calculated using 2021 CKD-EPI equation.   06/25/2021 74 >60 mL/min/[1.73_m2] Final     Comment:     Non  GFR Calc  Starting 12/18/2018, serum creatinine based estimated GFR (eGFR) will be   calculated using the Chronic Kidney Disease Epidemiology Collaboration   (CKD-EPI) equation.       GFR Estimate If Black   Date Value Ref Range Status   06/25/2021 86 >60 mL/min/[1.73_m2] Final     Comment:      GFR Calc  Starting 12/18/2018, serum creatinine based estimated GFR (eGFR) will be   calculated using the Chronic Kidney Disease Epidemiology Collaboration   (CKD-EPI) equation.       Lab Results   Component Value Date    CR 0.97 04/24/2023    CR 1.11 06/25/2021     No results found for: MICROALBUMIN    Healthy Eating:  Healthy Eating Assessed Today: Yes  Cultural/Mormon diet restrictions?: No  Meal planning/habits: Smaller portions  How many times a week on average do you eat food made away from " home (restaurant/take-out)?: 2  Meals include: Breakfast, Lunch, Dinner, Evening Snack  Breakfast: protein drink OR eggs/fish  Lunch: hamburger OR ham and cheese sandwich and ICE  Dinner: Meat and potatoes OR fish/chicken and pasta (processed foods) OR Frozen pizza  Snacks: sunflower seeds, almonds, cooies on occasion  Beverages: Water, Other (Cut out the 4-5 bottles of soda a day. Now drinks 1-2 ICE beverages -lasts all day. Also found a SF beverage.)    Being Active:  Being Active Assessed Today: Yes  Exercise:: Yes (walking 2-3.5 miles a day.)  Days per week of moderate to strenuous exercise (like a brisk walk): 5 (Walking dog daily and gym 2-3 days a week.)  On average, minutes per day of exercise at this level: 30 (30-90 minutes)  How intense was your typical exercise? : Moderate (like brisk walking)  Exercise Minutes per Week: 150  Barrier to exercise: Other (Last December took a break and had trouble getting back into it.)    Monitoring:  Monitoring Assessed Today: Yes  Did patient bring glucose meter to appointment? : Yes  Blood Glucose Meter: Accu-chek (Guide meter)  Times checking blood sugar at home (number): 1  Times checking blood sugar at home (per): Day  Blood glucose trend: Decreasing    Blood glucose: not provided    Taking Medications:  Diabetes Medication(s)       Biguanides       metFORMIN (GLUCOPHAGE XR) 500 MG 24 hr tablet    Take 2 tablets (1000 mg) by mouth with breakfast and 1 tablet (500 mg) with dinner.      Insulin       insulin glargine (BASAGLAR KWIKPEN) 100 UNIT/ML pen    Inject 32 Units Subcutaneous At Bedtime            Taking Medication Assessed Today: Yes  Current Treatments: Insulin Injections, Oral Medication (taken by mouth)  Dose schedule: At bedtime  Given by: Patient  Injection/Infusion sites: Abdomen  Problems taking diabetes medications regularly?: No  Diabetes medication side effects?: No    Problem Solving:  Problem Solving Assessed Today: Yes  Is the patient at risk for  hypoglycemia?: Yes  Hypoglycemia Frequency: Never  Hypoglycemia Treatment:  (soda)              Reducing Risks:  Reducing Risks Assessed Today: Yes  Diabetes Risks: Age over 45 years  CAD Risks: Diabetes Mellitus, Dyslipidemia, Family history, Hypertension, Obesity, Stress  Has dilated eye exam at least once a year?: Yes  Sees dentist every 6 months?: No (Tries to go every 6 months.)  Feet checked by healthcare provider in the last year?: No    Healthy Coping:  Healthy Coping Assessed Today: Yes  Emotional response to diabetes: Ready to learn, Acceptance (Says feels better now with diagnosis than when he was first diagnosed.)  Informal Support system:: Children, Friends  Stage of change: ACTION (Actively working towards change)  Patient Activation Measure Survey Score:       No data to display                  Care Plan and Education Provided:  Care Plan: Diabetes   Updates made by Pat Fallon RD since 8/21/2023 12:00 AM        Problem: HbA1C Not In Goal         Goal: Get HbA1C Level in Goal         Task: Educate patient on diabetes education self-management topics Completed 8/21/2023   Responsible User: Pat Fallon RD        Problem: Diabetes Self-Management Education Needed to Optimize Self-Care Behaviors         Goal: Healthy Eating - follow a healthy eating pattern for diabetes         Task: Provide education on heart healthy eating Completed 8/21/2023   Responsible User: Pat Fallon RD        Goal: Monitoring - monitor glucose and ketones as directed         Task: Provide education on continuous glucose monitoring (sensor placement, use of concha or /reader, understanding glucose trends, alerts and alarms, differences between sensor glucose and blood glucose) Completed 8/21/2023   Responsible User: Pat Fallon RD        Goal: Taking Medication - patient is consistently taking medications as directed    This Visit's Progress: 100%   Recent Progress: 100%   Note:    Increase Basaglar to  23 units and 2 Metformin a day.       Goal: Problem Solving - know how to prevent and manage short-term diabetes complications         Task: Provide education on safe travel with diabetes Completed 8/21/2023   Responsible User: Pat Fallon RD        Task: Provide education on how to care for diabetes on sick days Completed 8/21/2023   Responsible User: Pat Fallon RD        Task: Provide education on when to call a health care provider Completed 8/21/2023   Responsible User: Pat Fallon RD        Goal: Reducing Risks - know how to prevent and treat long-term diabetes complications         Task: Provide education on recommendations for heart health - lipid levels and goals, blood pressure and goals, and aspirin therapy, if indicated    Responsible User: Pat Fallon RD   Note:    Reviewed heart-healthy eating       Goal: Healthy Coping - use available resources to cope with the challenges of managing diabetes         Task: Discuss recognizing feelings about having diabetes Completed 8/21/2023   Responsible User: Pat Fallon RD        Task: Discuss methods for coping with stress Completed 8/21/2023   Responsible User: Pat Fallon RD Kaydee Brown, RDN, LD, [facility] [place] [place]     Time Spent: 39 minutes  Encounter Type: Individual    Any diabetes medication dose changes were made via the CDE Protocol per the patient's referring provider. A copy of this encounter was shared with the provider.

## 2023-08-26 ENCOUNTER — HEALTH MAINTENANCE LETTER (OUTPATIENT)
Age: 58
End: 2023-08-26

## 2023-09-05 ENCOUNTER — LAB (OUTPATIENT)
Dept: LAB | Facility: CLINIC | Age: 58
End: 2023-09-05
Payer: COMMERCIAL

## 2023-09-05 DIAGNOSIS — Z12.5 SCREENING FOR PROSTATE CANCER: ICD-10-CM

## 2023-09-05 DIAGNOSIS — E11.59 TYPE 2 DIABETES MELLITUS WITH OTHER CIRCULATORY COMPLICATION, UNSPECIFIED WHETHER LONG TERM INSULIN USE (H): ICD-10-CM

## 2023-09-05 LAB
ALBUMIN SERPL BCG-MCNC: 4.3 G/DL (ref 3.5–5.2)
ALP SERPL-CCNC: 103 U/L (ref 40–129)
ALT SERPL W P-5'-P-CCNC: 25 U/L (ref 0–70)
ANION GAP SERPL CALCULATED.3IONS-SCNC: 11 MMOL/L (ref 7–15)
AST SERPL W P-5'-P-CCNC: 23 U/L (ref 0–45)
BILIRUB SERPL-MCNC: 0.4 MG/DL
BUN SERPL-MCNC: 13.5 MG/DL (ref 6–20)
CALCIUM SERPL-MCNC: 9.9 MG/DL (ref 8.6–10)
CHLORIDE SERPL-SCNC: 103 MMOL/L (ref 98–107)
CHOLEST SERPL-MCNC: 180 MG/DL
CREAT SERPL-MCNC: 0.95 MG/DL (ref 0.67–1.17)
CREAT UR-MCNC: 177 MG/DL
DEPRECATED HCO3 PLAS-SCNC: 27 MMOL/L (ref 22–29)
ERYTHROCYTE [DISTWIDTH] IN BLOOD BY AUTOMATED COUNT: 12.1 % (ref 10–15)
GFR SERPL CREATININE-BSD FRML MDRD: >90 ML/MIN/1.73M2
GLUCOSE SERPL-MCNC: 120 MG/DL (ref 70–99)
HCT VFR BLD AUTO: 44.9 % (ref 40–53)
HDLC SERPL-MCNC: 46 MG/DL
HGB BLD-MCNC: 15.3 G/DL (ref 13.3–17.7)
LDLC SERPL CALC-MCNC: 107 MG/DL
MCH RBC QN AUTO: 30.1 PG (ref 26.5–33)
MCHC RBC AUTO-ENTMCNC: 34.1 G/DL (ref 31.5–36.5)
MCV RBC AUTO: 88 FL (ref 78–100)
MICROALBUMIN UR-MCNC: <12 MG/L
MICROALBUMIN/CREAT UR: NORMAL MG/G{CREAT}
NONHDLC SERPL-MCNC: 134 MG/DL
PLATELET # BLD AUTO: 239 10E3/UL (ref 150–450)
POTASSIUM SERPL-SCNC: 3.9 MMOL/L (ref 3.4–5.3)
PROT SERPL-MCNC: 7.1 G/DL (ref 6.4–8.3)
PSA SERPL DL<=0.01 NG/ML-MCNC: 0.31 NG/ML (ref 0–3.5)
RBC # BLD AUTO: 5.08 10E6/UL (ref 4.4–5.9)
SODIUM SERPL-SCNC: 141 MMOL/L (ref 136–145)
TRIGL SERPL-MCNC: 135 MG/DL
WBC # BLD AUTO: 5.7 10E3/UL (ref 4–11)

## 2023-09-05 PROCEDURE — 82043 UR ALBUMIN QUANTITATIVE: CPT

## 2023-09-05 PROCEDURE — 85027 COMPLETE CBC AUTOMATED: CPT

## 2023-09-05 PROCEDURE — 80061 LIPID PANEL: CPT

## 2023-09-05 PROCEDURE — 36415 COLL VENOUS BLD VENIPUNCTURE: CPT

## 2023-09-05 PROCEDURE — 82570 ASSAY OF URINE CREATININE: CPT

## 2023-09-05 PROCEDURE — 80053 COMPREHEN METABOLIC PANEL: CPT

## 2023-09-05 PROCEDURE — G0103 PSA SCREENING: HCPCS

## 2023-09-06 ASSESSMENT — ENCOUNTER SYMPTOMS
HEADACHES: 0
HEMATURIA: 0
NAUSEA: 0
DIARRHEA: 0
HEARTBURN: 0
ABDOMINAL PAIN: 0
DIZZINESS: 0
MYALGIAS: 0
DYSURIA: 0
FEVER: 0
WEAKNESS: 0
CHILLS: 0
NERVOUS/ANXIOUS: 1
CONSTIPATION: 0
PARESTHESIAS: 0
JOINT SWELLING: 0
EYE PAIN: 0
SHORTNESS OF BREATH: 0
SORE THROAT: 0
COUGH: 0
HEMATOCHEZIA: 0
ARTHRALGIAS: 0
FREQUENCY: 0
PALPITATIONS: 0

## 2023-09-08 ENCOUNTER — OFFICE VISIT (OUTPATIENT)
Dept: FAMILY MEDICINE | Facility: CLINIC | Age: 58
End: 2023-09-08
Attending: FAMILY MEDICINE
Payer: COMMERCIAL

## 2023-09-08 VITALS
RESPIRATION RATE: 12 BRPM | DIASTOLIC BLOOD PRESSURE: 78 MMHG | OXYGEN SATURATION: 98 % | HEART RATE: 80 BPM | HEIGHT: 72 IN | SYSTOLIC BLOOD PRESSURE: 131 MMHG | TEMPERATURE: 98.1 F | WEIGHT: 265 LBS | BODY MASS INDEX: 35.89 KG/M2

## 2023-09-08 DIAGNOSIS — R22.9 SKIN MASS: ICD-10-CM

## 2023-09-08 DIAGNOSIS — I10 BENIGN ESSENTIAL HYPERTENSION: ICD-10-CM

## 2023-09-08 DIAGNOSIS — H90.5 SENSORINEURAL HEARING LOSS (SNHL), UNSPECIFIED LATERALITY: ICD-10-CM

## 2023-09-08 DIAGNOSIS — Z00.00 HEALTH MAINTENANCE EXAMINATION: Primary | ICD-10-CM

## 2023-09-08 DIAGNOSIS — E11.69 TYPE 2 DIABETES MELLITUS WITH OTHER SPECIFIED COMPLICATION, UNSPECIFIED WHETHER LONG TERM INSULIN USE (H): ICD-10-CM

## 2023-09-08 DIAGNOSIS — F41.9 ANXIETY: ICD-10-CM

## 2023-09-08 DIAGNOSIS — E78.5 HYPERLIPIDEMIA LDL GOAL <160: ICD-10-CM

## 2023-09-08 PROCEDURE — 99396 PREV VISIT EST AGE 40-64: CPT | Performed by: FAMILY MEDICINE

## 2023-09-08 PROCEDURE — 99213 OFFICE O/P EST LOW 20 MIN: CPT | Mod: 25 | Performed by: FAMILY MEDICINE

## 2023-09-08 RX ORDER — LISINOPRIL/HYDROCHLOROTHIAZIDE 10-12.5 MG
1 TABLET ORAL DAILY
Qty: 90 TABLET | Refills: 3 | Status: SHIPPED | OUTPATIENT
Start: 2023-09-08

## 2023-09-08 RX ORDER — ATORVASTATIN CALCIUM 20 MG/1
20 TABLET, FILM COATED ORAL DAILY
Qty: 90 TABLET | Refills: 3 | Status: SHIPPED | OUTPATIENT
Start: 2023-09-08

## 2023-09-08 ASSESSMENT — ENCOUNTER SYMPTOMS
HEARTBURN: 0
CHILLS: 0
SHORTNESS OF BREATH: 0
COUGH: 0
DIARRHEA: 0
HEADACHES: 0
FEVER: 0
DYSURIA: 0
ABDOMINAL PAIN: 0
FREQUENCY: 0
ARTHRALGIAS: 0
CONSTIPATION: 0
DIZZINESS: 0
EYE PAIN: 0
NAUSEA: 0
SORE THROAT: 0
WEAKNESS: 0
HEMATOCHEZIA: 0
PALPITATIONS: 0
JOINT SWELLING: 0
MYALGIAS: 0
PARESTHESIAS: 0
NERVOUS/ANXIOUS: 1
HEMATURIA: 0

## 2023-09-08 ASSESSMENT — PAIN SCALES - GENERAL: PAINLEVEL: NO PAIN (0)

## 2023-09-08 NOTE — PROGRESS NOTES
SUBJECTIVE:   CC: Amilcar is an 58 year old who presents for preventative health visit.       2023    10:16 AM   Additional Questions   Roomed by darin       Healthy Habits:     Getting at least 3 servings of Calcium per day:  NO    Bi-annual eye exam:  Yes    Dental care twice a year:  Yes    Sleep apnea or symptoms of sleep apnea:  None    Diet:  Diabetic    Frequency of exercise:  2-3 days/week    Duration of exercise:  30-45 minutes    Taking medications regularly:  Yes    Medication side effects:  None    Additional concerns today:  Yes      Social History     Tobacco Use    Smoking status: Former     Types: Cigarettes     Quit date: 1985     Years since quittin.1    Smokeless tobacco: Never   Substance Use Topics    Alcohol use: Yes     Comment: less than 1 drink per week             2023     7:45 AM   Alcohol Use   Prescreen: >3 drinks/day or >7 drinks/week? No          No data to display                Last PSA:   PSA   Date Value Ref Range Status   2021 0.28 0 - 4 ug/L Final     Comment:     Assay Method:  Chemiluminescence using Siemens Vista analyzer     Prostate Specific Antigen Screen   Date Value Ref Range Status   2023 0.31 0.00 - 3.50 ng/mL Final   2022 0.30 0.00 - 4.00 ug/L Final       Reviewed orders with patient. Reviewed health maintenance and updated orders accordingly - Yes  BP Readings from Last 3 Encounters:   23 131/78   23 126/76   23 130/80    Wt Readings from Last 3 Encounters:   23 120.2 kg (265 lb)   23 117.9 kg (260 lb)   23 117.9 kg (260 lb)                  Patient Active Problem List   Diagnosis    Obesity    Hyperlipidemia LDL goal <160    Benign essential hypertension    Severe obesity (BMI 35.0-35.9 with comorbidity) (H)    Tear of medial meniscus of right knee, current    Anxiety    Gastroesophageal reflux disease with esophagitis without hemorrhage    Diabetes mellitus, type 2 (H)     Past Surgical History:    Procedure Laterality Date    ARTHROSCOPY KNEE RT/LT Right 2019    Right Knee arthroscopy, partial medial meniscectomy, DOS 2018, Dr. Gustavo Kathleen --[facility].    COLONOSCOPY  2016    EYE SURGERY      When I was 3 yrs old    HERNIA REPAIR  1968    NO HISTORY OF SURGERY      ZZC NONSPECIFIC PROCEDURE      hernia surgery as a baby       Social History     Tobacco Use    Smoking status: Former     Types: Cigarettes     Quit date: 1985     Years since quittin.1    Smokeless tobacco: Never   Substance Use Topics    Alcohol use: Yes     Comment: less than 1 drink per week     Family History   Problem Relation Age of Onset    Hypertension Father     Diabetes Mother     C.A.D. Mother     Cerebrovascular Disease Mother     Lipids Mother     Cancer - colorectal Paternal Grandfather         also 8 paternal aunts and uncles with colon CA         Current Outpatient Medications   Medication Sig Dispense Refill    alcohol swab prep pads Use to swab area of injection/corie as directed. 100 each 3    atorvastatin (LIPITOR) 20 MG tablet Take 1 tablet (20 mg) by mouth daily 90 tablet 3    blood glucose (NO BRAND SPECIFIED) test strip Use to test blood sugar once times daily or as directed. To accompany: Blood Glucose Monitor Brands: per insurance. 100 strip 6    blood glucose calibration (NO BRAND SPECIFIED) solution To accompany: Blood Glucose Monitor Brands: per insurance. 1 each 0    blood glucose monitoring (NO BRAND SPECIFIED) meter device kit Use to test blood sugar one times daily or as directed. Preferred blood glucose meter OR supplies to accompany: Blood Glucose Monitor Brands: per insurance. 1 kit 0    Continuous Blood Gluc Sensor (FREESTYLE MALLORY 3 SENSOR) [facility] 1 each every 14 days Use 1 sensor every 14 days. Use to read blood sugars per 's instructions. 2 each 11    insulin glargine (BASAGLAR KWIKPEN) 100 UNIT/ML pen Inject 24 Units Subcutaneous At Bedtime + 2 unit  prime 15  mL 3    insulin pen needle (ULTICARE) 29G X 12.7MM miscellaneous Use one pen needles daily or as directed. 100 each 3    lisinopril-hydrochlorothiazide (ZESTORETIC) 10-12.5 MG tablet Take 1 tablet by mouth daily 90 tablet 3    metFORMIN (GLUCOPHAGE XR) 500 MG 24 hr tablet Take 2 tablets (1000 mg) by mouth with breakfast and 1 tablet (500 mg) with dinner. 270 tablet 1    sertraline (ZOLOFT) 50 MG tablet TAKE 1 TABLET BY MOUTH EVERY DAY 90 tablet 2    thin (NO BRAND SPECIFIED) lancets Use with lanceting device. To accompany: Blood Glucose Monitor Brands: per insurance. 100 each 1     No Known Allergies  Recent Labs   Lab Test 09/05/23  0947 04/24/23  1527 07/13/22  0850 07/13/22  0850 06/25/21  0822 06/11/20  1403 03/24/17  0931 03/29/16  0000 07/18/15  0517 07/17/15  0926   A1C  --  12.3*  --   --   --   --   --   --   --  5.8   *  --   --  101* 105* 89   < >  --    < >  --    HDL 46  --   --  48 48 52   < >  --    < >  --    TRIG 135  --   --  192* 104 101   < >  --    < >  --    ALT 25 38  --  49 43 39   < >  --    < > 40   CR 0.95 0.97   < > 1.06 1.11 1.06   < >  --    < > 0.98   GFRESTIMATED >90 >90   < > 82 74 79   < >  --    < > 81   GFRESTBLACK  --   --   --   --  86 >90   < >  --    < > >90   GFR Calc     POTASSIUM 3.9 3.9  --  3.9 3.9 4.0   < >  --    < > 3.9   TSH  --   --   --   --   --   --   --  1.62  --   --     < > = values in this interval not displayed.        Reviewed and updated as needed this visit by clinical staff   Tobacco  Allergies  Meds              Reviewed and updated as needed this visit by Provider                 Past Medical History:   Diagnosis Date    Benign essential hypertension 08/03/2015    Diabetes (H) 2023    Heartburn     Hypotestosteronism     Obesity 04/30/2009      Past Surgical History:   Procedure Laterality Date    ARTHROSCOPY KNEE RT/LT Right 01/09/2019    Right Knee arthroscopy, partial medial meniscectomy, DOS 1/09/2018, Dr. Gustavo Kathleen --[facility].    COLONOSCOPY  July 2016    EYE SURGERY      When I was 3 yrs old    HERNIA REPAIR  1968    NO HISTORY OF SURGERY      ZZC NONSPECIFIC PROCEDURE      hernia surgery as a baby       Review of Systems   Constitutional:  Negative for chills and fever.   HENT:  Positive for hearing loss. Negative for congestion, ear pain and sore throat.    Eyes:  Negative for pain and visual disturbance.   Respiratory:  Negative for cough and shortness of breath.    Cardiovascular:  Negative for chest pain, palpitations and peripheral edema.   Gastrointestinal:  Negative for abdominal pain, constipation, diarrhea, heartburn, hematochezia and nausea.   Genitourinary:  Negative for dysuria, frequency, genital sores, hematuria, impotence, penile discharge and urgency.   Musculoskeletal:  Negative for arthralgias, joint swelling and myalgias.   Skin:  Negative for rash.   Neurological:  Negative for dizziness, weakness, headaches and paresthesias.   Psychiatric/Behavioral:  Negative for mood changes. The patient is nervous/anxious.          OBJECTIVE:   /78   Pulse 80   Temp 98.1  F (36.7  C) (Temporal)   Resp 12   Ht 1.824 m (5' 11.8\")   Wt 120.2 kg (265 lb)   SpO2 98%   BMI 36.14 kg/m      Physical Exam  GENERAL: healthy, alert and no distress  EYES: Eyes grossly normal to inspection, PERRL and conjunctivae and sclerae normal  HENT: normal cephalic/atraumatic, ear canals and TM's normal, nose and mouth without ulcers or lesions, oropharynx clear, and oral mucous membranes moist  NECK: no adenopathy, no asymmetry, masses, or scars and thyroid normal to palpation  RESP: lungs clear to auscultation - no rales, rhonchi or wheezes  CV: regular rate and rhythm, normal S1 S2, no S3 or S4, no murmur, click or rub, no peripheral edema and peripheral pulses strong  ABDOMEN: soft, nontender, no hepatosplenomegaly, no masses and bowel sounds normal  MS: no gross musculoskeletal defects noted, no edema  SKIN: lipoma like " lesion on mid back(3cm),  NEURO: Normal strength and tone, mentation intact and speech normal  BACK: no CVA tenderness, no paralumbar tenderness  PSYCH: mentation appears normal, affect normal/bright        ASSESSMENT/PLAN:   (Z00.00) Health maintenance examination  (primary encounter diagnosis)  Plan: HEMOGLOBIN A1C            (E11.69) Type 2 diabetes mellitus with other specified complication, unspecified whether long term insulin use (H)  Comment: has been stable, keep monitoring   Plan: HEMOGLOBIN A1C, FOOT EXAM, atorvastatin         (LIPITOR) 20 MG tablet            (I10) Benign essential hypertension  Comment: stable, keep monitoring   Plan: atorvastatin (LIPITOR) 20 MG tablet,         lisinopril-hydrochlorothiazide (ZESTORETIC)         10-12.5 MG tablet            (F41.9) Anxiety  Comment: stable, need update on health care provider form   Plan: mentioned above     (E78.5) Hyperlipidemia LDL goal <160  Comment: stable  Plan: atorvastatin (LIPITOR) 20 MG tablet        Keep monitoring     (R22.9) Skin mass  Comment: on back  for over past 5-7 years, has been growing,  will have him to see dermatology for further evaluation   Plan: Adult Dermatology Referral            (H90.5) Sensorineural hearing loss (SNHL), unspecified laterality  Comment: worsening without sign of infection, has no wax impaction    Will have him to see audiology for further evaluation   Plan: Adult Audiology  Referral            Patient has been advised of split billing requirements and indicates understanding: Yes      COUNSELING:   Reviewed preventive health counseling, as reflected in patient instructions        He reports that he quit smoking about 38 years ago. His smoking use included cigarettes. He has never used smokeless tobacco.            Chris Eason MD  [facility]

## 2023-09-11 ENCOUNTER — TELEPHONE (OUTPATIENT)
Dept: FAMILY MEDICINE | Facility: CLINIC | Age: 58
End: 2023-09-11
Payer: COMMERCIAL

## 2023-09-11 ENCOUNTER — LAB (OUTPATIENT)
Dept: LAB | Facility: CLINIC | Age: 58
End: 2023-09-11
Payer: COMMERCIAL

## 2023-09-11 DIAGNOSIS — Z00.00 HEALTH MAINTENANCE EXAMINATION: ICD-10-CM

## 2023-09-11 DIAGNOSIS — E11.69 TYPE 2 DIABETES MELLITUS WITH OTHER SPECIFIED COMPLICATION, UNSPECIFIED WHETHER LONG TERM INSULIN USE (H): ICD-10-CM

## 2023-09-11 LAB — HBA1C MFR BLD: 7.1 % (ref 0–5.6)

## 2023-09-11 PROCEDURE — 83036 HEMOGLOBIN GLYCOSYLATED A1C: CPT

## 2023-09-11 PROCEDURE — 36415 COLL VENOUS BLD VENIPUNCTURE: CPT

## 2023-09-11 NOTE — TELEPHONE ENCOUNTER
Received a healthcare provider form signed by dr suarez, with note to email or fax to patient. emailed to patient on 9/11/23, sent to [facility] and filed in team 3  
Espinoza Eric(Attending)

## 2023-09-14 ENCOUNTER — MYC MEDICAL ADVICE (OUTPATIENT)
Dept: FAMILY MEDICINE | Facility: CLINIC | Age: 58
End: 2023-09-14
Payer: COMMERCIAL

## 2023-09-21 NOTE — TELEPHONE ENCOUNTER
Spoke to pt, pt is picking up forms 9/22/23 around 4-4:30. Form are at the .       Estefanía Henderson RN on 9/21/2023 at 4:30 PM

## 2023-10-11 ENCOUNTER — TRANSFERRED RECORDS (OUTPATIENT)
Dept: MULTI SPECIALTY CLINIC | Facility: CLINIC | Age: 58
End: 2023-10-11

## 2023-10-11 LAB
ALT SERPL-CCNC: 38 U/L
AST SERPL-CCNC: 30 U/L
CHOLESTEROL (EXTERNAL): 180 MG/DL (ref 100–200)
GLUCOSE (EXTERNAL): 130 MG/DL (ref 65–100)
HBA1C MFR BLD: 7.2 % (ref 4.8–5.6)
HDLC SERPL-MCNC: 55 MG/DL
LDL CHOLESTEROL CALCULATED (EXTERNAL): 88 MG/DL
TRIGLYCERIDES (EXTERNAL): 186 MG/DL

## 2023-10-26 DIAGNOSIS — E11.65 UNCONTROLLED TYPE 2 DIABETES MELLITUS WITH HYPERGLYCEMIA (H): ICD-10-CM

## 2023-10-26 DIAGNOSIS — E11.9 NEW ONSET TYPE 2 DIABETES MELLITUS (H): ICD-10-CM

## 2023-10-26 RX ORDER — LANCETS
EACH MISCELLANEOUS
Qty: 100 EACH | Refills: 1 | Status: SHIPPED | OUTPATIENT
Start: 2023-10-26

## 2023-10-31 ENCOUNTER — OFFICE VISIT (OUTPATIENT)
Dept: AUDIOLOGY | Facility: CLINIC | Age: 58
End: 2023-10-31
Attending: FAMILY MEDICINE
Payer: COMMERCIAL

## 2023-10-31 DIAGNOSIS — H90.5 SENSORINEURAL HEARING LOSS (SNHL), UNSPECIFIED LATERALITY: ICD-10-CM

## 2023-10-31 DIAGNOSIS — H90.3 SENSORINEURAL HEARING LOSS, ASYMMETRICAL: Primary | ICD-10-CM

## 2023-10-31 PROCEDURE — 92550 TYMPANOMETRY & REFLEX THRESH: CPT | Performed by: AUDIOLOGIST

## 2023-10-31 PROCEDURE — 92557 COMPREHENSIVE HEARING TEST: CPT | Performed by: AUDIOLOGIST

## 2023-10-31 NOTE — PROGRESS NOTES
AUDIOLOGY REPORT    SUBJECTIVE:  Amilcar Yang is a 58 year old male who was seen in the Audiology Clinic at the [facility] for audiologic evaluation, referred by Chris Eason M.D. .No previous audiograms are available at today's appointment.  The patient reports a gradually decrease in hearing in the left ear. He states the hearing loss started about 2 years ago. The patient denies  bilateral tinnitus, bilateral otalgia, and history of noise exposure.  The patient notes difficulty with communication in a variety of listening situations.  They were accompanied today by their self.    OBJECTIVE:  Abuse Screening:  Do you feel unsafe at home or work/school? No  Do you feel threatened by someone? No  Does anyone try to keep you from having contact with others, or doing things outside of your home? No  Physical signs of abuse present? No     Fall Risk Screen:  1. Have you fallen two or more times in the past year? No  2. Have you fallen and had an injury in the past year? No      Otoscopic exam indicates ears are clear of cerumen bilaterally     Pure Tone Thresholds assessed using conventional audiometry with good  reliability from 250-8000 Hz bilaterally using insert earphones and circumaural headphones     RIGHT:  normal sloping to borderline-normal  high frequency hearing loss    LEFT:    normal sloping to mild sensorineural hearing loss    Tympanogram:    RIGHT: normal eardrum mobility    LEFT:   normal eardrum mobility    Reflexes (reported by stimulus ear):  RIGHT: Ipsilateral is absent at frequencies tested  RIGHT: Contralateral is absent at frequencies tested  LEFT:   Ipsilateral is absent at frequencies tested  LEFT:   Contralateral is absent at frequencies tested      Speech Reception Threshold:    RIGHT: 15 dB HL    LEFT:   15 dB HL  Word Recognition Score:     RIGHT: 100% at 55 dB HL using NU-6 recorded word list.    LEFT:   100% at 55 dB HL using NU-6 recorded word  list.      ASSESSMENT:     ICD-10-CM    1. Sensorineural hearing loss, asymmetrical  H90.3       2. Sensorineural hearing loss (SNHL), unspecified laterality  H90.5 Adult Audiology  Referral          Today s results were discussed with the patient in detail.     PLAN:  Patient was counseled regarding hearing loss and impact on communication.  It is recommended that the patient be seen by and ENT for evaluation of his asymmetrical sensorineural hearing loss. The patient was given a copy of his hearing evaluaiton.  Please call this clinic with questions regarding these results or recommendations.        Ashley Krishnan M.A. -[facility], #[id number]

## 2023-11-01 ENCOUNTER — OFFICE VISIT (OUTPATIENT)
Dept: FAMILY MEDICINE | Facility: CLINIC | Age: 58
End: 2023-11-01
Payer: COMMERCIAL

## 2023-11-01 DIAGNOSIS — L72.0 EPIDERMAL CYST: Primary | ICD-10-CM

## 2023-11-01 PROCEDURE — 99202 OFFICE O/P NEW SF 15 MIN: CPT | Performed by: NURSE PRACTITIONER

## 2023-11-01 ASSESSMENT — PAIN SCALES - GENERAL: PAINLEVEL: NO PAIN (0)

## 2023-11-01 NOTE — PATIENT INSTRUCTIONS
Patient Education       Proper skin care from [facility]:    -Eliminate harsh soaps as they strip the natural oils from the skin, often resulting in dry itchy skin ( i.e. Dial, Zest, [place])  -Use mild soaps such as Cetaphil or Dove Sensitive Skin in the shower. You do not need to use soap on arms, legs, and trunk every time you shower unless visibly soiled.   -Avoid hot or cold showers.  -After showering, lightly dry off and apply moisturizing within 2-3 minutes. This will help trap moisture in the skin.   -Aggressive use of a moisturizer at least 1-2 times a day to the entire body (including -Vanicream, Cetaphil, Aquaphor or Cerave) and moisturize hands after every washing.  -We recommend using moisturizers that come in a tub that needs to be scooped out, not a pump. This has more of an oil base. It will hold moisture in your skin much better than a water base moisturizer. The above recommended are non-pore clogging.      Wear a sunscreen with at least SPF 30 on your face, ears, neck and V of the chest daily. Wear sunscreen on other areas of the body if those areas are exposed to the sun throughout the day. Sunscreens can contain physical and/or chemical blockers. Physical blockers are less likely to clog pores, these include zinc oxide and titanium dioxide. Reapply every two hour and after swimming.     Sunscreen examples: https://www.ewg.org/sunscreen/    UV radiation  UVA radiation remains constant throughout the day and throughout the year. It is a longer wavelength than UVB and therefore penetrates deeper into the skin leading to immediate and delayed tanning, photoaging, and skin cancer. 70-80% of UVA and UVB radiation occurs between the hours of 10am-2pm.  UVB radiation  UVB radiation causes the most harmful effects and is more significant during the summer months. However, snow and ice can reflect UVB radiation leading to skin damage during the winter months as well. UVB radiation is  responsible for tanning, burning, inflammation, delayed erythema (pinkness), pigmentation (brown spots), and skin cancer.     I recommend self monthly full body exams and yearly full body exams with a dermatology provider. If you develop a new or changing lesion please follow up for examination. Most skin cancers are pink and scaly or pink and pearly. However, we do see blue/brown/black skin cancers.  Consider the ABCDEs of melanoma when giving yourself your monthly full body exam ( don't forget the groin, buttocks, feet, toes, etc). A-asymmetry, B-borders, C-color, D-diameter, E-elevation or evolving. If you see any of these changes please follow up in clinic. If you cannot see your back I recommend purchasing a hand held mirror to use with a larger wall mirror.       Checking for Skin Cancer  You can find cancer early by checking your skin each month. There are 3 kinds of skin cancer. They are melanoma, basal cell carcinoma, and squamous cell carcinoma. Doing monthly skin checks is the best way to find new marks or skin changes. Follow the instructions below for checking your skin.   The ABCDEs of checking moles for melanoma   Check your moles or growths for signs of melanoma using ABCDE:   Asymmetry: the sides of the mole or growth don t match  Border: the edges are ragged, notched, or blurred  Color: the color within the mole or growth varies  Diameter: the mole or growth is larger than 6 mm (size of a pencil eraser)  Evolving: the size, shape, or color of the mole or growth is changing (evolving is not shown in the images below)    Checking for other types of skin cancer  Basal cell carcinoma or squamous cell carcinoma have symptoms such as:     A spot or mole that looks different from all other marks on your skin  Changes in how an area feels, such as itching, tenderness, or pain  Changes in the skin's surface, such as oozing, bleeding, or scaliness  A sore that does not heal  New swelling or redness beyond  the border of a mole    Who s at risk?  Anyone can get skin cancer. But you are at greater risk if you have:   Fair skin, light-colored hair, or light-colored eyes  Many moles or abnormal moles on your skin  A history of sunburns from sunlight or tanning beds  A family history of skin cancer  A history of exposure to radiation or chemicals  A weakened immune system  If you have had skin cancer in the past, you are at risk for recurring skin cancer.   How to check your skin  Do your monthly skin checkups in front of a full-length mirror. Check all parts of your body, including your:   Head (ears, face, neck, and scalp)  Torso (front, back, and sides)  Arms (tops, undersides, upper, and lower armpits)  Hands (palms, backs, and fingers, including under the nails)  Buttocks and genitals  Legs (front, back, and sides)  Feet (tops, soles, toes, including under the nails, and between toes)  If you have a lot of moles, take digital photos of them each month. Make sure to take photos both up close and from a distance. These can help you see if any moles change over time.   Most skin changes are not cancer. But if you see any changes in your skin, call your doctor right away. Only he or she can diagnose a problem. If you have skin cancer, seeing your doctor can be the first step toward getting the treatment that could save your life.   [employer] last reviewed this educational content on 4/1/2019 2000-2020 [employer]. [address] [place] [address], [place] [address]. All rights reserved. This information is not intended as a substitute for professional medical care. Always follow your healthcare professional's instructions.       When should I call my doctor?  If you are worsening or not improving, please, contact us or seek urgent care as noted below.     Who should I call with questions (adults)?  [facility] (adult and pediatric): [phone]  [facility] (adult): [phone]  [facility] ([place], [place], [place] and [place]) [phone]  For urgent needs outside of business hours call the [facility] at [phone] and ask for the dermatology resident on call to be paged  If this is a medical emergency and you are unable to reach an ER, Call 911      If you need a prescription refill, please contact your pharmacy. Refills are approved or denied by our Physicians during normal business hours, Monday through Fridays  Per office policy, refills will not be granted if you have not been seen within the past year (or sooner depending on your child's condition)

## 2023-11-01 NOTE — PROGRESS NOTES
[facility] Dermatology Note  Encounter Date: Nov 1, 2023  Office Visit     Reviewed patients past medical history and pertinent chart review prior to patients visit today.     Dermatology Problem List:  Epidermal cyst, mid back.  Excision scheduled in November 2023    ____________________________________________    Assessment & Plan:     # Cyst. Benign, no further treatment needed. Discussed excision if patient is bothered by the lesion due to irritation. Patient prefers to have the lesion removed and is aware of the risk of infection, scar, incomplete removal and recurrence. Patient scheduled follow up with me for excision in a few weeks    Selena Diehl CNP  Dermatology    _______________________________________    CC: Skin Check (C/O lump on back)    HPI:  Mr. Amilcar Yang is a(n) 58 year old male who presents today as a new patient for lump on the back.  He has had this for over 10 years, it is gotten slightly larger and bothersome.  Has never drained    Patient is otherwise feeling well, without additional skin concerns.      Physical Exam:  SKIN: Focused examination of back was performed.  -Midline mid back, 3 cm skin colored nodule with central punctum    - No other lesions of concern on areas examined.     Medications:  Current Outpatient Medications   Medication    alcohol swab prep pads    atorvastatin (LIPITOR) 20 MG tablet    blood glucose (NO BRAND SPECIFIED) test strip    blood glucose calibration (NO BRAND SPECIFIED) solution    blood glucose monitoring (NO BRAND SPECIFIED) meter device kit    blood glucose monitoring (SOFTCLIX) lancets    Continuous Blood Gluc Sensor (FREESTYLE MALLORY 3 SENSOR) MISC    insulin glargine (BASAGLAR KWIKPEN) 100 UNIT/ML pen    insulin pen needle (ULTICARE) 29G X 12.7MM miscellaneous    lisinopril-hydrochlorothiazide (ZESTORETIC) 10-12.5 MG tablet    metFORMIN (GLUCOPHAGE XR) 500 MG 24 hr tablet    sertraline (ZOLOFT) 50 MG tablet     No current  facility-administered medications for this visit.      Past Medical History:   Patient Active Problem List   Diagnosis    Obesity    Hyperlipidemia LDL goal <160    Benign essential hypertension    Severe obesity (BMI 35.0-35.9 with comorbidity) (H)    Tear of medial meniscus of right knee, current    Anxiety    Gastroesophageal reflux disease with esophagitis without hemorrhage    Diabetes mellitus, type 2 (H)     Past Medical History:   Diagnosis Date    Benign essential hypertension 08/03/2015    Diabetes (H) 2023    Heartburn     Hypotestosteronism     Obesity 04/30/2009       CC Chris Eason MD  [address] [place] [phone] on close of this encounter.

## 2023-11-01 NOTE — LETTER
11/1/2023         RE: Amilcar Yang  [address] [facility] [address]        Dear Colleague,    Thank you for referring your patient, Amilcar Yang, to the [facility]. Please see a copy of my visit note below.    [facility] Dermatology Note  Encounter Date: Nov 1, 2023  Office Visit     Reviewed patients past medical history and pertinent chart review prior to patients visit today.     Dermatology Problem List:  Epidermal cyst, mid back.  Excision scheduled in November 2023    ____________________________________________    Assessment & Plan:     # Cyst. Benign, no further treatment needed. Discussed excision if patient is bothered by the lesion due to irritation. Patient prefers to have the lesion removed and is aware of the risk of infection, scar, incomplete removal and recurrence. Patient scheduled follow up with me for excision in a few weeks    Selena Diehl CNP  Dermatology    _______________________________________    CC: Skin Check (C/O lump on back)    HPI:  Mr. Amilcar Yang is a(n) 58 year old male who presents today as a new patient for lump on the back.  He has had this for over 10 years, it is gotten slightly larger and bothersome.  Has never drained    Patient is otherwise feeling well, without additional skin concerns.      Physical Exam:  SKIN: Focused examination of back was performed.  -Midline mid back, 3 cm skin colored nodule with central punctum    - No other lesions of concern on areas examined.     Medications:  Current Outpatient Medications   Medication     alcohol swab prep pads     atorvastatin (LIPITOR) 20 MG tablet     blood glucose (NO BRAND SPECIFIED) test strip     blood glucose calibration (NO BRAND SPECIFIED) solution     blood glucose monitoring (NO BRAND SPECIFIED) meter device kit     blood glucose monitoring (SOFTCLIX) lancets     Continuous Blood Gluc Sensor (FREESTYLE MALLORY 3 SENSOR) MISC     insulin glargine  (BASAGLAR KWIKPEN) 100 UNIT/ML pen     insulin pen needle (ULTICARE) 29G X 12.7MM miscellaneous     lisinopril-hydrochlorothiazide (ZESTORETIC) 10-12.5 MG tablet     metFORMIN (GLUCOPHAGE XR) 500 MG 24 hr tablet     sertraline (ZOLOFT) 50 MG tablet     No current facility-administered medications for this visit.      Past Medical History:   Patient Active Problem List   Diagnosis     Obesity     Hyperlipidemia LDL goal <160     Benign essential hypertension     Severe obesity (BMI 35.0-35.9 with comorbidity) (H)     Tear of medial meniscus of right knee, current     Anxiety     Gastroesophageal reflux disease with esophagitis without hemorrhage     Diabetes mellitus, type 2 (H)     Past Medical History:   Diagnosis Date     Benign essential hypertension 08/03/2015     Diabetes (H) 2023     Heartburn      Hypotestosteronism      Obesity 04/30/2009       CC Chris Eason MD  [address] [place] [address] on close of this encounter.       Again, thank you for allowing me to participate in the care of your patient.        Sincerely,        ALVARADO Morales CNP   3 Self

## 2023-11-07 ENCOUNTER — TELEPHONE (OUTPATIENT)
Dept: FAMILY MEDICINE | Facility: CLINIC | Age: 58
End: 2023-11-07

## 2023-11-07 NOTE — TELEPHONE ENCOUNTER
S/w pt who states his insurance runs out the end of the month and wanted to make sure he was still covered for suture removal.  Nurse advised there is no charge for suture removal with the nurse.  Pt states understanding and is ok with 11/22 appt.    Shanon COBIAN RN  [facility] [place]  [phone]

## 2023-11-07 NOTE — TELEPHONE ENCOUNTER
[facility] Call Center    Phone Message    May a detailed message be left on voicemail: yes     Reason for Call: Other: Pt states he would like to be added to a wait list for his cyst procedure on 11/22/23. Please call Pt back to discuss if there is a sooner visit. Thank you.      Action Taken: Other: [facility] Skin    Travel Screening: Not Applicable

## 2023-11-22 ENCOUNTER — OFFICE VISIT (OUTPATIENT)
Dept: FAMILY MEDICINE | Facility: CLINIC | Age: 58
End: 2023-11-22
Payer: COMMERCIAL

## 2023-11-22 DIAGNOSIS — L72.0 EPIDERMOID CYST OF SKIN OF BACK: ICD-10-CM

## 2023-11-22 PROCEDURE — 12032 INTMD RPR S/A/T/EXT 2.6-7.5: CPT | Performed by: NURSE PRACTITIONER

## 2023-11-22 PROCEDURE — 88304 TISSUE EXAM BY PATHOLOGIST: CPT | Performed by: PATHOLOGY

## 2023-11-22 PROCEDURE — 11403 EXC TR-EXT B9+MARG 2.1-3CM: CPT | Performed by: NURSE PRACTITIONER

## 2023-11-22 ASSESSMENT — PAIN SCALES - GENERAL: PAINLEVEL: NO PAIN (0)

## 2023-11-22 NOTE — LETTER
11/22/2023         RE: Amilcar Yang  [address] [facility] [address]        Dear Colleague,    Thank you for referring your patient, Amilcar Yang, to the [facility]. Please see a copy of my visit note below.    DERMATOLOGIC EXCISION SURGERY PROCEDURE NOTE   Dermatology Problem List:  Epidermal cyst, mid back.  Excision 11/22/2023       NAME OF PROCEDURE: Excision with intermediate linear closure  Staff surgeon: Joanna Diehl CNP  PRE-OPERATIVE DIAGNOSIS:  cyst  POST-OPERATIVE DIAGNOSIS: Same   FINAL EXCISION SIZE(EXCISION DEFECT SIZE): 3 cm   FINAL REPAIR LENGTH: 3 cm   INDICATIONS: This patient presented with a 3cm cyst of the mid back. Excision was indicated.   We discussed the principles of treatment and most likely complications including bleeding, infection, scarring, alteration in skin color and sensation, wound dehiscence,muscle weakness in the area, or recurrence of the lesion or disease. We reviewed that on occasion, after healing, a secondary procedure or revision may be recommended in order to obtain the best cosmetic or functional result.     PROCEDURE: The patient was taken to the operative suite. Time-out was performed. The treatment area was anesthetized with 1% lidocaine and epinephrine (1:100,000). The area was cleansed with surgical prep and draped with sterile drape. The lesion was delineated and excised down to deep subcutaneous fat in an elliptical manner. Hemostasis was obtained by electrocoagulation.     REPAIR: A complex layered linear closure was selected as the procedure which would maximally preserve both function and cosmesis. After the excision of the tumor, Hemostasis was obtained with spot electrocautery and ligation of vessels where necessary. The subcutaneous and dermal layers were then closed with 3-0 vicryl sutures. The epidermis was then carefully approximated along the length of the wound using 4-0 prolene sutures.   The final  wound length was 3 cm. A total of less than 2 ml of anesthesia was administered for all surgical sites. Estimated blood loss was less than 10 ml for all surgical sites. A sterile pressure dressing was applied and wound care instructions, with a written handout, were given. The patient was [facility] alert and ambulatory.    Follow up as needed for wound evaluation.   Suture removal in 14 days.   Selena Diehl CNP  Dermatology          Again, thank you for allowing me to participate in the care of your patient.        Sincerely,        ALVARADO Morales CNP

## 2023-11-22 NOTE — PROGRESS NOTES
DERMATOLOGIC EXCISION SURGERY PROCEDURE NOTE   Dermatology Problem List:  Epidermal cyst, mid back.  Excision 11/22/2023       NAME OF PROCEDURE: Excision with intermediate linear closure  Staff surgeon: Joanna Diehl CNP  PRE-OPERATIVE DIAGNOSIS:  cyst  POST-OPERATIVE DIAGNOSIS: Same   FINAL EXCISION SIZE(EXCISION DEFECT SIZE): 3 cm   FINAL REPAIR LENGTH: 3 cm   INDICATIONS: This patient presented with a 3cm cyst of the mid back. Excision was indicated.   We discussed the principles of treatment and most likely complications including bleeding, infection, scarring, alteration in skin color and sensation, wound dehiscence,muscle weakness in the area, or recurrence of the lesion or disease. We reviewed that on occasion, after healing, a secondary procedure or revision may be recommended in order to obtain the best cosmetic or functional result.     PROCEDURE: The patient was taken to the operative suite. Time-out was performed. The treatment area was anesthetized with 1% lidocaine and epinephrine (1:100,000). The area was cleansed with surgical prep and draped with sterile drape. The lesion was delineated and excised down to deep subcutaneous fat in an elliptical manner. Hemostasis was obtained by electrocoagulation.     REPAIR: A complex layered linear closure was selected as the procedure which would maximally preserve both function and cosmesis. After the excision of the tumor, Hemostasis was obtained with spot electrocautery and ligation of vessels where necessary. The subcutaneous and dermal layers were then closed with 3-0 vicryl sutures. The epidermis was then carefully approximated along the length of the wound using 4-0 prolene sutures.   The final wound length was 3 cm. A total of less than 2 ml of anesthesia was administered for all surgical sites. Estimated blood loss was less than 10 ml for all surgical sites. A sterile pressure dressing was applied and wound care instructions, with a written handout,  were given. The patient was discharged from the [facility] alert and ambulatory.    Follow up as needed for wound evaluation.   Suture removal in 14 days.   Selena Diehl CNP  Dermatology

## 2023-11-28 LAB
PATH REPORT.COMMENTS IMP SPEC: NORMAL
PATH REPORT.COMMENTS IMP SPEC: NORMAL
PATH REPORT.FINAL DX SPEC: NORMAL
PATH REPORT.GROSS SPEC: NORMAL
PATH REPORT.MICROSCOPIC SPEC OTHER STN: NORMAL
PATH REPORT.RELEVANT HX SPEC: NORMAL

## 2023-12-06 ENCOUNTER — ALLIED HEALTH/NURSE VISIT (OUTPATIENT)
Dept: FAMILY MEDICINE | Facility: CLINIC | Age: 58
End: 2023-12-06
Payer: COMMERCIAL

## 2023-12-06 DIAGNOSIS — F41.1 GAD (GENERALIZED ANXIETY DISORDER): ICD-10-CM

## 2023-12-06 DIAGNOSIS — Z48.02 VISIT FOR SUTURE REMOVAL: Primary | ICD-10-CM

## 2023-12-06 PROCEDURE — 99207 PR NO CHARGE NURSE ONLY: CPT | Performed by: NURSE PRACTITIONER

## 2023-12-06 NOTE — PROGRESS NOTES
Amilcar Yang presents to the clinic today for removal of sutures.  The patient has had the sutures in place for 14 days.  There has been no history of infection or drainage.  5 sutures are seen located on the mid back.  The wound is healing well with no signs of infection.  Tetanus status is up to date.   All sutures were easily removed today.  Routine wound care discussed.  The patient will follow up as needed.    Amilcar Yang comes into clinic today at the request of Selena Diehl CNP Ordering Provider for Suture Removal:  Incision was dry, clean and intact, incision cleansed with wound cleanser and sutures were removed. Pt tolerated the procedure. Benzoin and steristrips were placed per protocol and pt was instructed to leave them in place for 7-10 days. It is okay to shower with these in place, no need to cover. After this time the strerstrips will start loosen and should be removed.  . .    11/22/23    This service provided today was under the supervising provider of the day Selena Diehl CNP, who was available if needed.    Shanon COBIAN RN  [facility] Dermatology [facility] [place]  [phone]

## 2023-12-06 NOTE — PATIENT INSTRUCTIONS
WOUND CARE INSTRUCTIONS  for  SUTURE REMOVAL    [phone]      If there are any open or bleeding areas at the incision site you should begin to cover the area with a bandage daily as follows:    Clean and dry the area with plain tap water using a Q-tip or sterile gauze pad.  Apply Vaseline, Aquaphor, Polysporin or Bacitracin ointment to the open area.  Cover the wound with a band-aid or a sterile non-stick gauze pad and micropore paper tape.       *Once the bandages are removed, the scar will be red and firm (especially in the lip/chin area). This is normal and will fade in time. It might take 6-12 months for this to happen.     *Massaging the area will help the scar soften and fade quicker. Begin to massage the area in one month. To massage apply pressure directly and firmly over the scar with the fingertips and move in a circular motion. Massage the area for a few minutes several times a day. Continue to massage the site for several months.    *Numbness in the surgical area is expected. It might take 12-18 months for the feeling to return to normal. During this time sensations of itchiness, tingling and occasional sharp pains might be noted. These feelings are normal and will subside once the nerves have completely healed.      Patient Education       Proper skin care from [facility]:    -Eliminate harsh soaps as they strip the natural oils from the skin, often resulting in dry itchy skin ( i.e. Dial, Zest, Saray Spring)  -Use mild soaps such as Cetaphil or Dove Sensitive Skin in the shower. You do not need to use soap on arms, legs, and trunk every time you shower unless visibly soiled.   -Avoid hot or cold showers.  -After showering, lightly dry off and apply moisturizing within 2-3 minutes. This will help trap moisture in the skin.   -Aggressive use of a moisturizer at least 1-2 times a day to the entire body (including -Vanicream, Cetaphil, Aquaphor or Cerave) and moisturize hands after every  washing.  -We recommend using moisturizers that come in a tub that needs to be scooped out, not a pump. This has more of an oil base. It will hold moisture in your skin much better than a water base moisturizer. The above recommended are non-pore clogging.      Wear a sunscreen with at least SPF 30 on your face, ears, neck and V of the chest daily. Wear sunscreen on other areas of the body if those areas are exposed to the sun throughout the day. Sunscreens can contain physical and/or chemical blockers. Physical blockers are less likely to clog pores, these include zinc oxide and titanium dioxide. Reapply every two hour and after swimming.     Sunscreen examples: https://www.ewg.org/sunscreen/    UV radiation  UVA radiation remains constant throughout the day and throughout the year. It is a longer wavelength than UVB and therefore penetrates deeper into the skin leading to immediate and delayed tanning, photoaging, and skin cancer. 70-80% of UVA and UVB radiation occurs between the hours of 10am-2pm.  UVB radiation  UVB radiation causes the most harmful effects and is more significant during the summer months. However, snow and ice can reflect UVB radiation leading to skin damage during the winter months as well. UVB radiation is responsible for tanning, burning, inflammation, delayed erythema (pinkness), pigmentation (brown spots), and skin cancer.     I recommend self monthly full body exams and yearly full body exams with a dermatology provider. If you develop a new or changing lesion please follow up for examination. Most skin cancers are pink and scaly or pink and pearly. However, we do see blue/brown/black skin cancers.  Consider the ABCDEs of melanoma when giving yourself your monthly full body exam ( don't forget the groin, buttocks, feet, toes, etc). A-asymmetry, B-borders, C-color, D-diameter, E-elevation or evolving. If you see any of these changes please follow up in clinic. If you cannot see your back  I recommend purchasing a hand held mirror to use with a larger wall mirror.       Checking for Skin Cancer  You can find cancer early by checking your skin each month. There are 3 kinds of skin cancer. They are melanoma, basal cell carcinoma, and squamous cell carcinoma. Doing monthly skin checks is the best way to find new marks or skin changes. Follow the instructions below for checking your skin.   The ABCDEs of checking moles for melanoma   Check your moles or growths for signs of melanoma using ABCDE:   Asymmetry: the sides of the mole or growth don t match  Border: the edges are ragged, notched, or blurred  Color: the color within the mole or growth varies  Diameter: the mole or growth is larger than 6 mm (size of a pencil eraser)  Evolving: the size, shape, or color of the mole or growth is changing (evolving is not shown in the images below)    Checking for other types of skin cancer  Basal cell carcinoma or squamous cell carcinoma have symptoms such as:     A spot or mole that looks different from all other marks on your skin  Changes in how an area feels, such as itching, tenderness, or pain  Changes in the skin's surface, such as oozing, bleeding, or scaliness  A sore that does not heal  New swelling or redness beyond the border of a mole    Who s at risk?  Anyone can get skin cancer. But you are at greater risk if you have:   Fair skin, light-colored hair, or light-colored eyes  Many moles or abnormal moles on your skin  A history of sunburns from sunlight or tanning beds  A family history of skin cancer  A history of exposure to radiation or chemicals  A weakened immune system  If you have had skin cancer in the past, you are at risk for recurring skin cancer.   How to check your skin  Do your monthly skin checkups in front of a full-length mirror. Check all parts of your body, including your:   Head (ears, face, neck, and scalp)  Torso (front, back, and sides)  Arms (tops, undersides, upper, and lower  armpits)  Hands (palms, backs, and fingers, including under the nails)  Buttocks and genitals  Legs (front, back, and sides)  Feet (tops, soles, toes, including under the nails, and between toes)  If you have a lot of moles, take digital photos of them each month. Make sure to take photos both up close and from a distance. These can help you see if any moles change over time.   Most skin changes are not cancer. But if you see any changes in your skin, call your doctor right away. Only he or she can diagnose a problem. If you have skin cancer, seeing your doctor can be the first step toward getting the treatment that could save your life.   [employer] last reviewed this educational content on 4/1/2019 2000-2020 [employer]. [address] [place] [address]. All rights reserved. This information is not intended as a substitute for professional medical care. Always follow your healthcare professional's instructions.       When should I call my doctor?  If you are worsening or not improving, please, contact us or seek urgent care as noted below.     Who should I call with questions (adults)?  [facility] (adult and pediatric): [phone]  [facility] (adult): [phone]  [facility] ([facility] and [place]) [phone]  For urgent needs outside of business hours call the [facility] at [phone] and ask for the dermatology resident on call to be paged  If this is a medical emergency and you are unable to reach an ER, Call [phone]      If you need a prescription refill, please contact your pharmacy. Refills are approved or denied by our Physicians during normal business hours, Monday through Fridays  Per office policy, refills will not be granted if you have not been seen within the past year (or sooner depending on your child's condition)

## 2023-12-29 DIAGNOSIS — E11.9 NEW ONSET TYPE 2 DIABETES MELLITUS (H): ICD-10-CM

## 2023-12-29 DIAGNOSIS — E11.65 UNCONTROLLED TYPE 2 DIABETES MELLITUS WITH HYPERGLYCEMIA (H): ICD-10-CM

## 2023-12-30 RX ORDER — LORATADINE 10 MG
TABLET ORAL
Qty: 100 EACH | Refills: 3 | Status: SHIPPED | OUTPATIENT
Start: 2023-12-30

## 2024-01-13 ENCOUNTER — HEALTH MAINTENANCE LETTER (OUTPATIENT)
Age: 59
End: 2024-01-13

## 2024-06-01 ENCOUNTER — HEALTH MAINTENANCE LETTER (OUTPATIENT)
Age: 59
End: 2024-06-01

## 2024-06-13 ENCOUNTER — MYC REFILL (OUTPATIENT)
Dept: FAMILY MEDICINE | Facility: CLINIC | Age: 59
End: 2024-06-13
Payer: COMMERCIAL

## 2024-06-13 DIAGNOSIS — F41.1 GAD (GENERALIZED ANXIETY DISORDER): ICD-10-CM

## 2024-08-08 ENCOUNTER — MYC REFILL (OUTPATIENT)
Dept: FAMILY MEDICINE | Facility: CLINIC | Age: 59
End: 2024-08-08
Payer: COMMERCIAL

## 2024-08-08 DIAGNOSIS — E11.9 NEW ONSET TYPE 2 DIABETES MELLITUS (H): ICD-10-CM

## 2024-08-08 DIAGNOSIS — E11.65 UNCONTROLLED TYPE 2 DIABETES MELLITUS WITH HYPERGLYCEMIA (H): ICD-10-CM

## 2024-08-08 RX ORDER — METFORMIN HCL 500 MG
TABLET, EXTENDED RELEASE 24 HR ORAL
Qty: 270 TABLET | Refills: 0 | Status: SHIPPED | OUTPATIENT
Start: 2024-08-08

## 2024-08-08 NOTE — LETTER
August 12, 2024      Amilcar NGUYEN Trey  [address] [facility] [address]          Dear Mr. Yang,    Our records indicate that it is time to schedule a visit with your primary care provider.  You are due to be seen for a routine annual preventative visit. Your primary care provider is booked up about 2 months in advance, so we suggest making an appointment now. We have sent to the pharmacy a 3 month refill of your medication until you can be seen by your provider.  You may call [phone] to schedule or via [facility] using the appointment tab.  If you are no longer a [facility] patient; please contact us and let us know that as well.  You will need to let the pharmacy know the name of your new provider so that they can send future refill requests to them.    Sincerely,    [facility] [place]

## 2024-08-08 NOTE — TELEPHONE ENCOUNTER
Left voicemail requesting a call back. Patient needs to schedule annual wellness and medication follow up for further refills.

## 2024-08-12 NOTE — TELEPHONE ENCOUNTER
Called pt, LVM requesting callback to schedule appointment.  3rd attempt. Letter sent.     Estefanía Henderson RN on 8/12/2024 at 8:29 AM

## 2024-09-17 DIAGNOSIS — E11.65 UNCONTROLLED TYPE 2 DIABETES MELLITUS WITH HYPERGLYCEMIA (H): Primary | ICD-10-CM

## 2024-09-17 NOTE — TELEPHONE ENCOUNTER
Clinic RN: Please contact patient because the medication is listed as historical or discontinued. Confirm patient is taking this medication. Document findings and route refill encounter to provider for approval or denial.    Marci Harry RN  [facility] Triage Team

## 2024-09-18 NOTE — TELEPHONE ENCOUNTER
Triage Patient Outreach    Attempt # 1    Was call answered?  No.  Left voicemail to return call to Triage at Primary Clinic    Marci Harry RN

## 2024-09-19 RX ORDER — INSULIN GLARGINE-YFGN 100 [IU]/ML
INJECTION, SOLUTION SUBCUTANEOUS
Qty: 45 ML | Refills: 0 | Status: SHIPPED | OUTPATIENT
Start: 2024-09-19

## 2024-10-19 ENCOUNTER — HEALTH MAINTENANCE LETTER (OUTPATIENT)
Age: 59
End: 2024-10-19

## 2024-11-10 DIAGNOSIS — I10 BENIGN ESSENTIAL HYPERTENSION: ICD-10-CM

## 2024-11-11 RX ORDER — LISINOPRIL AND HYDROCHLOROTHIAZIDE 10; 12.5 MG/1; MG/1
1 TABLET ORAL DAILY
Qty: 180 TABLET | Refills: 0 | Status: SHIPPED | OUTPATIENT
Start: 2024-11-11

## 2024-11-18 DIAGNOSIS — E78.5 HYPERLIPIDEMIA LDL GOAL <160: ICD-10-CM

## 2024-11-18 DIAGNOSIS — E11.69 TYPE 2 DIABETES MELLITUS WITH OTHER SPECIFIED COMPLICATION, UNSPECIFIED WHETHER LONG TERM INSULIN USE (H): ICD-10-CM

## 2024-11-18 DIAGNOSIS — I10 BENIGN ESSENTIAL HYPERTENSION: ICD-10-CM

## 2024-11-19 RX ORDER — ATORVASTATIN CALCIUM 20 MG/1
20 TABLET, FILM COATED ORAL DAILY
Qty: 90 TABLET | Refills: 0 | Status: SHIPPED | OUTPATIENT
Start: 2024-11-19

## 2024-11-25 ENCOUNTER — DOCUMENTATION ONLY (OUTPATIENT)
Dept: FAMILY MEDICINE | Facility: CLINIC | Age: 59
End: 2024-11-25
Payer: COMMERCIAL

## 2024-11-25 DIAGNOSIS — E78.5 HYPERLIPIDEMIA LDL GOAL <160: ICD-10-CM

## 2024-11-25 DIAGNOSIS — E11.69 TYPE 2 DIABETES MELLITUS WITH OTHER SPECIFIED COMPLICATION, UNSPECIFIED WHETHER LONG TERM INSULIN USE (H): ICD-10-CM

## 2024-11-25 DIAGNOSIS — Z12.5 SCREENING FOR PROSTATE CANCER: ICD-10-CM

## 2024-11-25 DIAGNOSIS — I10 BENIGN ESSENTIAL HYPERTENSION: ICD-10-CM

## 2024-11-25 DIAGNOSIS — Z00.00 HEALTH MAINTENANCE EXAMINATION: Primary | ICD-10-CM

## 2024-11-25 NOTE — PROGRESS NOTES
Amilcar PATRICK Yang has an upcoming lab appointment:    Future Appointments   Date Time Provider Department Center   12/2/2024  8:15 AM [facility]   12/9/2024  3:30 PM Chris Eason MD [facility]     Patient is scheduled for the following lab(s):   Scheduling Notes: Diabetic tests   Rescheduled: 10/2/2024 7:27 AM By: ANIL ROSALES       There is no order available. Please review and place either future orders or HMPO (Review of Health Maintenance Protocol Orders), as appropriate.    Health Maintenance Due   Topic    A1C     BMP     MICROALBUMIN     ANNUAL REVIEW OF HM ORDERS     LIPID      Sumanth Rucker

## 2024-12-01 ENCOUNTER — TRANSFERRED RECORDS (OUTPATIENT)
Dept: MULTI SPECIALTY CLINIC | Facility: CLINIC | Age: 59
End: 2024-12-01

## 2024-12-01 LAB — RETINOPATHY: NORMAL

## 2024-12-02 ENCOUNTER — LAB (OUTPATIENT)
Dept: LAB | Facility: CLINIC | Age: 59
End: 2024-12-02
Payer: COMMERCIAL

## 2024-12-02 DIAGNOSIS — E78.5 HYPERLIPIDEMIA LDL GOAL <160: ICD-10-CM

## 2024-12-02 DIAGNOSIS — I10 BENIGN ESSENTIAL HYPERTENSION: ICD-10-CM

## 2024-12-02 DIAGNOSIS — Z00.00 HEALTH MAINTENANCE EXAMINATION: ICD-10-CM

## 2024-12-02 DIAGNOSIS — Z12.5 SCREENING FOR PROSTATE CANCER: ICD-10-CM

## 2024-12-02 DIAGNOSIS — E11.69 TYPE 2 DIABETES MELLITUS WITH OTHER SPECIFIED COMPLICATION, UNSPECIFIED WHETHER LONG TERM INSULIN USE (H): ICD-10-CM

## 2024-12-02 LAB
ALBUMIN SERPL BCG-MCNC: 4.2 G/DL (ref 3.5–5.2)
ALP SERPL-CCNC: 122 U/L (ref 40–150)
ALT SERPL W P-5'-P-CCNC: 39 U/L (ref 0–70)
ANION GAP SERPL CALCULATED.3IONS-SCNC: 12 MMOL/L (ref 7–15)
AST SERPL W P-5'-P-CCNC: 24 U/L (ref 0–45)
BILIRUB SERPL-MCNC: 0.6 MG/DL
BUN SERPL-MCNC: 13.4 MG/DL (ref 6–20)
CALCIUM SERPL-MCNC: 10.7 MG/DL (ref 8.8–10.4)
CHLORIDE SERPL-SCNC: 99 MMOL/L (ref 98–107)
CHOLEST SERPL-MCNC: 189 MG/DL
CREAT SERPL-MCNC: 1.04 MG/DL (ref 0.67–1.17)
CREAT UR-MCNC: 243 MG/DL
EGFRCR SERPLBLD CKD-EPI 2021: 83 ML/MIN/1.73M2
ERYTHROCYTE [DISTWIDTH] IN BLOOD BY AUTOMATED COUNT: 11.7 % (ref 10–15)
EST. AVERAGE GLUCOSE BLD GHB EST-MCNC: 280 MG/DL
FASTING STATUS PATIENT QL REPORTED: YES
FASTING STATUS PATIENT QL REPORTED: YES
GLUCOSE SERPL-MCNC: 227 MG/DL (ref 70–99)
HBA1C MFR BLD: 11.4 % (ref 0–5.6)
HCO3 SERPL-SCNC: 28 MMOL/L (ref 22–29)
HCT VFR BLD AUTO: 46.8 % (ref 40–53)
HDLC SERPL-MCNC: 42 MG/DL
HGB BLD-MCNC: 16 G/DL (ref 13.3–17.7)
LDLC SERPL CALC-MCNC: 111 MG/DL
MCH RBC QN AUTO: 29.5 PG (ref 26.5–33)
MCHC RBC AUTO-ENTMCNC: 34.2 G/DL (ref 31.5–36.5)
MCV RBC AUTO: 86 FL (ref 78–100)
MICROALBUMIN UR-MCNC: <12 MG/L
MICROALBUMIN/CREAT UR: NORMAL MG/G{CREAT}
NONHDLC SERPL-MCNC: 147 MG/DL
PLATELET # BLD AUTO: 246 10E3/UL (ref 150–450)
POTASSIUM SERPL-SCNC: 4 MMOL/L (ref 3.4–5.3)
PROT SERPL-MCNC: 7.2 G/DL (ref 6.4–8.3)
PSA SERPL DL<=0.01 NG/ML-MCNC: 0.32 NG/ML (ref 0–3.5)
RBC # BLD AUTO: 5.43 10E6/UL (ref 4.4–5.9)
SODIUM SERPL-SCNC: 139 MMOL/L (ref 135–145)
TRIGL SERPL-MCNC: 178 MG/DL
WBC # BLD AUTO: 6.5 10E3/UL (ref 4–11)

## 2024-12-02 PROCEDURE — 82570 ASSAY OF URINE CREATININE: CPT

## 2024-12-02 PROCEDURE — 80061 LIPID PANEL: CPT

## 2024-12-02 PROCEDURE — 82043 UR ALBUMIN QUANTITATIVE: CPT

## 2024-12-02 PROCEDURE — 85027 COMPLETE CBC AUTOMATED: CPT

## 2024-12-02 PROCEDURE — G0103 PSA SCREENING: HCPCS

## 2024-12-02 PROCEDURE — 36415 COLL VENOUS BLD VENIPUNCTURE: CPT

## 2024-12-02 PROCEDURE — 80053 COMPREHEN METABOLIC PANEL: CPT

## 2024-12-02 PROCEDURE — 83036 HEMOGLOBIN GLYCOSYLATED A1C: CPT

## 2024-12-08 SDOH — HEALTH STABILITY: PHYSICAL HEALTH: ON AVERAGE, HOW MANY DAYS PER WEEK DO YOU ENGAGE IN MODERATE TO STRENUOUS EXERCISE (LIKE A BRISK WALK)?: 3 DAYS

## 2024-12-08 SDOH — HEALTH STABILITY: PHYSICAL HEALTH: ON AVERAGE, HOW MANY MINUTES DO YOU ENGAGE IN EXERCISE AT THIS LEVEL?: 30 MIN

## 2024-12-08 ASSESSMENT — SOCIAL DETERMINANTS OF HEALTH (SDOH): HOW OFTEN DO YOU GET TOGETHER WITH FRIENDS OR RELATIVES?: PATIENT DECLINED

## 2024-12-09 ENCOUNTER — OFFICE VISIT (OUTPATIENT)
Dept: FAMILY MEDICINE | Facility: CLINIC | Age: 59
End: 2024-12-09
Payer: COMMERCIAL

## 2024-12-09 VITALS
SYSTOLIC BLOOD PRESSURE: 123 MMHG | TEMPERATURE: 98.2 F | WEIGHT: 261 LBS | BODY MASS INDEX: 36.54 KG/M2 | DIASTOLIC BLOOD PRESSURE: 85 MMHG | HEIGHT: 71 IN | OXYGEN SATURATION: 97 % | RESPIRATION RATE: 18 BRPM | HEART RATE: 98 BPM

## 2024-12-09 DIAGNOSIS — E11.9 NEW ONSET TYPE 2 DIABETES MELLITUS (H): ICD-10-CM

## 2024-12-09 DIAGNOSIS — E78.5 HYPERLIPIDEMIA LDL GOAL <160: ICD-10-CM

## 2024-12-09 DIAGNOSIS — Z00.00 HEALTH MAINTENANCE EXAMINATION: Primary | ICD-10-CM

## 2024-12-09 DIAGNOSIS — I10 BENIGN ESSENTIAL HYPERTENSION: ICD-10-CM

## 2024-12-09 DIAGNOSIS — E11.65 UNCONTROLLED TYPE 2 DIABETES MELLITUS WITH HYPERGLYCEMIA (H): ICD-10-CM

## 2024-12-09 DIAGNOSIS — E11.69 TYPE 2 DIABETES MELLITUS WITH OTHER SPECIFIED COMPLICATION, UNSPECIFIED WHETHER LONG TERM INSULIN USE (H): ICD-10-CM

## 2024-12-09 PROCEDURE — 99207 PR FOOT EXAM NO CHARGE: CPT | Performed by: FAMILY MEDICINE

## 2024-12-09 PROCEDURE — 99214 OFFICE O/P EST MOD 30 MIN: CPT | Mod: 25 | Performed by: FAMILY MEDICINE

## 2024-12-09 PROCEDURE — 99396 PREV VISIT EST AGE 40-64: CPT | Performed by: FAMILY MEDICINE

## 2024-12-09 RX ORDER — METFORMIN HYDROCHLORIDE 500 MG/1
TABLET, EXTENDED RELEASE ORAL
Qty: 270 TABLET | Refills: 1 | Status: SHIPPED | OUTPATIENT
Start: 2024-12-09

## 2024-12-09 ASSESSMENT — PAIN SCALES - GENERAL: PAINLEVEL_OUTOF10: NO PAIN (0)

## 2024-12-09 NOTE — PROGRESS NOTES
"Preventive Care Visit  [facility] DEWEY Eason MD, Family Medicine  Dec 9, 2024      Assessment & Plan     Health maintenance examination    - FOOT EXAM    Type 2 diabetes mellitus with other specified complication, unspecified whether long term insulin use (H)  Has been fluctuating after lost job and not able to use med regimen regularly, fortunately he got a new job, and started taking meds regularly last 2 weeks,   Will keep monitoring and recheck in 6 months   - FOOT EXAM    Benign essential hypertension  Stable     Hyperlipidemia LDL goal <160  Stable     New onset type 2 diabetes mellitus (H)  Mentioned above   - metFORMIN (GLUCOPHAGE XR) 500 MG 24 hr tablet; Take 2 tablets (1000 mg) by mouth with breakfast and 1 tablet (500 mg) with dinner.    Uncontrolled type 2 diabetes mellitus with hyperglycemia (H)    - metFORMIN (GLUCOPHAGE XR) 500 MG 24 hr tablet; Take 2 tablets (1000 mg) by mouth with breakfast and 1 tablet (500 mg) with dinner.    Patient has been advised of split billing requirements and indicates understanding: Yes        BMI  Estimated body mass index is 36.54 kg/m  as calculated from the following:    Height as of this encounter: 1.8 m (5' 10.87\").    Weight as of this encounter: 118.4 kg (261 lb).   Weight management plan: Discussed healthy diet and exercise guidelines    Counseling  Appropriate preventive services were addressed with this patient via screening, questionnaire, or discussion as appropriate for fall prevention, nutrition, physical activity, Tobacco-use cessation, social engagement, weight loss and cognition.  Checklist reviewing preventive services available has been given to the patient.  Reviewed patient's diet, addressing concerns and/or questions.   He is at risk for lack of exercise and has been provided with information to increase physical activity for the benefit of his well-being.       FUTURE APPOINTMENTS:       - Follow-up visit in 6 months "     Luisa Fitzgerald is a 59 year old, presenting for the following:  Physical (Non fasting. )        12/9/2024     3:14 PM   Additional Questions   Roomed by Olga Lidia LEGGETT    Health Care Directive  Patient does not have a Health Care Directive: Patient states has Advance Directive and will bring in a copy to clinic.      12/8/2024   General Health   How would you rate your overall physical health? (!) FAIR   Feel stress (tense, anxious, or unable to sleep) Patient declined            12/8/2024   Nutrition   Three or more servings of calcium each day? (!) NO   Diet: Diabetic   How many servings of fruit and vegetables per day? (!) 0-1   How many sweetened beverages each day? (!) 2            12/8/2024   Exercise   Days per week of moderate/strenous exercise 3 days   Average minutes spent exercising at this level 30 min            12/8/2024   Social Factors   Frequency of gathering with friends or relatives Patient declined   Worry food won't last until get money to buy more Patient declined   Food not last or not have enough money for food? Patient declined   Do you have housing? (Housing is defined as stable permanent housing and does not include staying ouside in a car, in a tent, in an abandoned building, in an overnight shelter, or couch-surfing.) Patient declined   Are you worried about losing your housing? Patient declined   Lack of transportation? Patient declined   Unable to get utilities (heat,electricity)? Patient declined            12/8/2024   Fall Risk   Fallen 2 or more times in the past year? No    Trouble with walking or balance? No        Patient-reported          12/8/2024   Dental   Dentist two times every year? (!) DECLINE            12/8/2024   TB Screening   Were you born outside of the [place]? Decline            Today's PHQ-2 Score:       12/8/2024     9:13 AM   PHQ-2 ( 1999 [employer])   Q1: Little interest or pleasure in doing things 1    Q2: Feeling down, depressed or hopeless 1    PHQ-2  Score 2    Q1: Little interest or pleasure in doing things Several days   Q2: Feeling down, depressed or hopeless Several days   PHQ-2 Score 2       Patient-reported           2024   Substance Use   Alcohol more than 3/day or more than 7/wk Not Applicable   Do you use any other substances recreationally? (!) DECLINE        Social History     Tobacco Use    Smoking status: Former     Current packs/day: 0.00     Types: Cigarettes     Quit date: 1985     Years since quittin.4    Smokeless tobacco: Never   Vaping Use    Vaping status: Never Used   Substance Use Topics    Alcohol use: Yes     Comment: less than 1 drink per week    Drug use: No           2024   STI Screening   New sexual partner(s) since last STI/HIV test? (!) DECLINE      Last PSA:   PSA   Date Value Ref Range Status   2021 0.28 0 - 4 ug/L Final     Comment:     Assay Method:  Chemiluminescence using Siemens Vista analyzer     Prostate Specific Antigen Screen   Date Value Ref Range Status   2024 0.32 0.00 - 3.50 ng/mL Final   2022 0.30 0.00 - 4.00 ug/L Final     ASCVD Risk   The 10-year ASCVD risk score (Leon SANDOVAL, et al., 2019) is: 15.9%    Values used to calculate the score:      Age: 59 years      Sex: Male      Is Non- : No      Diabetic: Yes      Tobacco smoker: No      Systolic Blood Pressure: 123 mmHg      Is BP treated: Yes      HDL Cholesterol: 42 mg/dL      Total Cholesterol: 189 mg/dL           Reviewed and updated as needed this visit by Provider                    Past Medical History:   Diagnosis Date    Benign essential hypertension 2015    Diabetes (H)     Heartburn     Hypotestosteronism     Obesity 2009     Past Surgical History:   Procedure Laterality Date    ARTHROSCOPY KNEE RT/LT Right 2019    Right Knee arthroscopy, partial medial meniscectomy, DOS 2018, Dr. Gustavo [facility].    COLONOSCOPY  2016    EYE SURGERY       When I was 3 yrs old    HERNIA REPAIR  1968    NO HISTORY OF SURGERY      [facility] [place] NONSPECIFIC PROCEDURE      hernia surgery as a baby     Labs reviewed in EPIC  BP Readings from Last 3 Encounters:   24 123/85   23 131/78   23 126/76    Wt Readings from Last 3 Encounters:   24 118.4 kg (261 lb)   23 120.2 kg (265 lb)   23 117.9 kg (260 lb)                  Patient Active Problem List   Diagnosis    Obesity    Hyperlipidemia LDL goal <160    Benign essential hypertension    Severe obesity (BMI 35.0-35.9 with comorbidity) (H)    Tear of medial meniscus of right knee, current    Anxiety    Gastroesophageal reflux disease with esophagitis without hemorrhage    Diabetes mellitus, type 2 (H)     Past Surgical History:   Procedure Laterality Date    ARTHROSCOPY KNEE RT/LT Right 2019    Right Knee arthroscopy, partial medial meniscectomy, DOS 2018, Dr. Gustavo Kathleen --[facility].    COLONOSCOPY  2016    EYE SURGERY      When I was 3 yrs old    HERNIA REPAIR  1968    NO HISTORY OF SURGERY      [facility] [place] NONSPECIFIC PROCEDURE      hernia surgery as a baby       Social History     Tobacco Use    Smoking status: Former     Current packs/day: 0.00     Types: Cigarettes     Quit date: 1985     Years since quittin.4    Smokeless tobacco: Never   Substance Use Topics    Alcohol use: Yes     Comment: less than 1 drink per week     Family History   Problem Relation Age of Onset    Hypertension Father     Diabetes Mother     C.A.D. Mother     Cerebrovascular Disease Mother     Lipids Mother     Cancer - colorectal Paternal Grandfather         also 8 paternal aunts and uncles with colon CA         Current Outpatient Medications   Medication Sig Dispense Refill    Alcohol Swabs ([employer] PREP) 70 % PADS USE TO SWAB AREA OF INJECTION/ADI AS DIRECTED. 100 each 3    atorvastatin (LIPITOR) 20 MG tablet TAKE ONE TABLET BY MOUTH ONE TIME DAILY 90 tablet 0    blood glucose (NO BRAND  SPECIFIED) test strip Use to test blood sugar once times daily or as directed. To accompany: Blood Glucose Monitor Brands: per insurance. 100 strip 6    blood glucose calibration (NO BRAND SPECIFIED) solution To accompany: Blood Glucose Monitor Brands: per insurance. 1 each 0    blood glucose monitoring (NO BRAND SPECIFIED) meter device kit Use to test blood sugar one times daily or as directed. Preferred blood glucose meter OR supplies to accompany: Blood Glucose Monitor Brands: per insurance. 1 kit 0    blood glucose monitoring (SOFTCLIX) lancets USE WITH LANCETING DEVICE. TO ACCOMPANY: USE TO TEST ONCE A  each 1    Continuous Blood Gluc Sensor (FREESTYLE MALLORY 3 SENSOR) MISC 1 each every 14 days Use 1 sensor every 14 days. Use to read blood sugars per 's instructions. 2 each 11    insulin glargine (BASAGLAR KWIKPEN) 100 UNIT/ML pen Inject 24 Units Subcutaneous at bedtime + 2 unit  prime 15 mL 3    Insulin Glargine-yfgn 100 UNIT/ML SOPN INJECT 24 UNITS UNDER THE SKIN AT BEDTIME + A 2 UNIT PRIME 45 mL 0    insulin pen needle (ULTICARE) 29G X 12.7MM miscellaneous Use one pen needles daily or as directed. 100 each 3    lisinopril-hydrochlorothiazide (ZESTORETIC) 10-12.5 MG tablet TAKE ONE TABLET BY MOUTH ONE TIME DAILY 180 tablet 0    metFORMIN (GLUCOPHAGE XR) 500 MG 24 hr tablet Take 2 tablets (1000 mg) by mouth with breakfast and 1 tablet (500 mg) with dinner. 270 tablet 1    sertraline (ZOLOFT) 50 MG tablet Take 1 tablet (50 mg) by mouth daily 90 tablet 0     No Known Allergies  Recent Labs   Lab Test 12/02/24  0815 10/11/23  1200 09/11/23  1420 09/05/23  0947 04/24/23  1527 07/13/22  0850 07/13/22  0850 06/25/21  0822 06/11/20  1403   A1C 11.4*  --  7.1*  --  12.3*  --   --   --   --    *  --   --  107*  --   --  101* 105* 89   HDL 42  --   --  46  --   --  48 48 52   TRIG 178* 186*  --  135  --    < > 192* 104 101   ALT 39  --   --  25 38   < > 49 43 39   CR 1.04  --   --  0.95 0.97   <  "> 1.06 1.11 1.06   GFRESTIMATED 83  --   --  >90 >90   < > 82 74 79   GFRESTBLACK  --   --   --   --   --   --   --  86 >90   POTASSIUM 4.0  --   --  3.9 3.9   < > 3.9 3.9 4.0    < > = values in this interval not displayed.          Review of Systems  Constitutional, HEENT, cardiovascular, pulmonary, GI, , musculoskeletal, neuro, skin, endocrine and psych systems are negative, except as otherwise noted.     Objective    Exam  /85   Pulse 98   Temp 98.2  F (36.8  C) (Temporal)   Resp 18   Ht 1.8 m (5' 10.87\")   Wt 118.4 kg (261 lb)   SpO2 97%   BMI 36.54 kg/m     Estimated body mass index is 36.54 kg/m  as calculated from the following:    Height as of this encounter: 1.8 m (5' 10.87\").    Weight as of this encounter: 118.4 kg (261 lb).    Physical Exam  GENERAL: alert and no distress  EYES: Eyes grossly normal to inspection, PERRL and conjunctivae and sclerae normal  HENT: ear canals and TM's normal, nose and mouth without ulcers or lesions  NECK: no adenopathy, no asymmetry, masses, or scars  RESP: lungs clear to auscultation - no rales, rhonchi or wheezes  CV: regular rate and rhythm, normal S1 S2, no S3 or S4, no murmur, click or rub, no peripheral edema  ABDOMEN: soft, nontender, no hepatosplenomegaly, no masses and bowel sounds normal  MS: no gross musculoskeletal defects noted, no edema  SKIN: no suspicious lesions or rashes  NEURO: Normal strength and tone, mentation intact and speech normal        Signed Electronically by: Chrsi Eason MD    "

## 2024-12-10 ENCOUNTER — TELEPHONE (OUTPATIENT)
Dept: FAMILY MEDICINE | Facility: CLINIC | Age: 59
End: 2024-12-10
Payer: COMMERCIAL

## 2024-12-10 NOTE — TELEPHONE ENCOUNTER
Patient Quality Outreach    Patient is due for the following:   Diabetes -  Eye Exam    Action(s) Taken:   Called  [facility] where pt had last visit exam. Confirmed last office visit May 2023.     Type of outreach:    Chart review performed, no outreach needed.    Questions for provider review:    None           Kirstie Rocha, [facility]

## 2025-02-10 ENCOUNTER — TRANSFERRED RECORDS (OUTPATIENT)
Dept: HEALTH INFORMATION MANAGEMENT | Facility: CLINIC | Age: 60
End: 2025-02-10
Payer: COMMERCIAL

## 2025-02-10 LAB — EJECTION FRACTION: NORMAL %

## 2025-02-12 ENCOUNTER — MYC MEDICAL ADVICE (OUTPATIENT)
Dept: FAMILY MEDICINE | Facility: CLINIC | Age: 60
End: 2025-02-12
Payer: COMMERCIAL

## 2025-02-12 ENCOUNTER — MYC REFILL (OUTPATIENT)
Dept: FAMILY MEDICINE | Facility: CLINIC | Age: 60
End: 2025-02-12
Payer: COMMERCIAL

## 2025-02-12 DIAGNOSIS — E11.9 NEW ONSET TYPE 2 DIABETES MELLITUS (H): ICD-10-CM

## 2025-02-12 DIAGNOSIS — E11.69 TYPE 2 DIABETES MELLITUS WITH OTHER SPECIFIED COMPLICATION, UNSPECIFIED WHETHER LONG TERM INSULIN USE (H): ICD-10-CM

## 2025-02-12 DIAGNOSIS — E11.65 UNCONTROLLED TYPE 2 DIABETES MELLITUS WITH HYPERGLYCEMIA (H): ICD-10-CM

## 2025-02-12 DIAGNOSIS — E78.5 HYPERLIPIDEMIA LDL GOAL <160: ICD-10-CM

## 2025-02-12 DIAGNOSIS — I10 BENIGN ESSENTIAL HYPERTENSION: ICD-10-CM

## 2025-02-13 RX ORDER — LISINOPRIL AND HYDROCHLOROTHIAZIDE 10; 12.5 MG/1; MG/1
1 TABLET ORAL DAILY
Qty: 180 TABLET | Refills: 0 | Status: SHIPPED | OUTPATIENT
Start: 2025-02-13

## 2025-02-13 RX ORDER — METFORMIN HYDROCHLORIDE 500 MG/1
TABLET, EXTENDED RELEASE ORAL
Qty: 270 TABLET | Refills: 1 | OUTPATIENT
Start: 2025-02-13

## 2025-02-13 RX ORDER — ATORVASTATIN CALCIUM 20 MG/1
20 TABLET, FILM COATED ORAL DAILY
Qty: 90 TABLET | Refills: 0 | Status: SHIPPED | OUTPATIENT
Start: 2025-02-13

## 2025-02-17 DIAGNOSIS — E11.65 UNCONTROLLED TYPE 2 DIABETES MELLITUS WITH HYPERGLYCEMIA (H): ICD-10-CM

## 2025-02-17 RX ORDER — INSULIN GLARGINE 100 [IU]/ML
INJECTION, SOLUTION SUBCUTANEOUS
Qty: 45 ML | Refills: 1 | Status: SHIPPED | OUTPATIENT
Start: 2025-02-17

## 2025-02-19 ENCOUNTER — TELEPHONE (OUTPATIENT)
Dept: FAMILY MEDICINE | Facility: CLINIC | Age: 60
End: 2025-02-19
Payer: COMMERCIAL

## 2025-02-19 NOTE — TELEPHONE ENCOUNTER
PRIOR AUTHORIZATION DENIED    Medication: BASAGLAR KWIKPEN 100 UNIT/ML SC SOPN  Insurance Company: [employer] [place] - Phone [phone] Fax [phone]  Denial Date: 2/19/2025  Denial Reason(s): Preferred meds are Semglee-yfgn or insulin glargine-yfgn      Appeal Information:       Patient Notified: No

## 2025-03-06 ENCOUNTER — VIRTUAL VISIT (OUTPATIENT)
Dept: FAMILY MEDICINE | Facility: CLINIC | Age: 60
End: 2025-03-06
Payer: COMMERCIAL

## 2025-03-06 ENCOUNTER — MYC MEDICAL ADVICE (OUTPATIENT)
Dept: FAMILY MEDICINE | Facility: CLINIC | Age: 60
End: 2025-03-06

## 2025-03-06 DIAGNOSIS — I10 BENIGN ESSENTIAL HYPERTENSION: ICD-10-CM

## 2025-03-06 DIAGNOSIS — I25.10 CORONARY ARTERY DISEASE INVOLVING NATIVE CORONARY ARTERY OF NATIVE HEART, UNSPECIFIED WHETHER ANGINA PRESENT: ICD-10-CM

## 2025-03-06 DIAGNOSIS — I25.10 STENOSIS OF LEFT ANTERIOR DESCENDING (LAD) ARTERY: ICD-10-CM

## 2025-03-06 DIAGNOSIS — E11.59 TYPE 2 DIABETES MELLITUS WITH OTHER CIRCULATORY COMPLICATION, UNSPECIFIED WHETHER LONG TERM INSULIN USE (H): ICD-10-CM

## 2025-03-06 DIAGNOSIS — E78.5 HYPERLIPIDEMIA LDL GOAL <160: Primary | ICD-10-CM

## 2025-03-06 RX ORDER — ROSUVASTATIN CALCIUM 40 MG/1
40 TABLET, COATED ORAL DAILY
Qty: 90 TABLET | Refills: 1 | Status: SHIPPED | OUTPATIENT
Start: 2025-03-06

## 2025-03-06 NOTE — PROGRESS NOTES
Amilcar is a 59 year old who is being evaluated via a billable video visit.    How would you like to obtain your AVS? MyChart  If the video visit is dropped, the invitation should be resent by: Text to cell phone: [phone]  Will anyone else be joining your video visit? No      Assessment & Plan     Hyperlipidemia LDL goal <160  Has been fluctuating, will review the lab and update pt   He has LAD and LM coronary artery stenosis per his coronary imaging, will have him to change statin to 40mg rosuvastatin, will also have him to start asa 81mg,   Will consider adding PCK-9I in near future   - semaglutide (OZEMPIC) 2 MG/3ML pen; Inject 0.25 mg subcutaneously every 7 days.  - rosuvastatin (CRESTOR) 40 MG tablet; Take 1 tablet (40 mg) by mouth daily.  - Lipid panel reflex to direct LDL Fasting; Future  - Hemoglobin A1c; Future  - Comprehensive metabolic panel (BMP + Alb, Alk Phos, ALT, AST, Total. Bili, TP); Future    Benign essential hypertension  Stable   Keep monitoring   Will review the lab and update pt  - semaglutide (OZEMPIC) 2 MG/3ML pen; Inject 0.25 mg subcutaneously every 7 days.  - rosuvastatin (CRESTOR) 40 MG tablet; Take 1 tablet (40 mg) by mouth daily.  - Lipid panel reflex to direct LDL Fasting; Future  - Hemoglobin A1c; Future  - Comprehensive metabolic panel (BMP + Alb, Alk Phos, ALT, AST, Total. Bili, TP); Future    Type 2 diabetes mellitus with other circulatory complication, unspecified whether long term insulin use (H)  Has been worsening, will have him to start GLP-1 for better DM control and his underlying CAD  - semaglutide (OZEMPIC) 2 MG/3ML pen; Inject 0.25 mg subcutaneously every 7 days.  - rosuvastatin (CRESTOR) 40 MG tablet; Take 1 tablet (40 mg) by mouth daily.  - Lipid panel reflex to direct LDL Fasting; Future  - Hemoglobin A1c; Future  - Comprehensive metabolic panel (BMP + Alb, Alk Phos, ALT, AST, Total. Bili, TP); Future    Stenosis of left anterior descending (LAD) artery  Mentioned  above   - semaglutide (OZEMPIC) 2 MG/3ML pen; Inject 0.25 mg subcutaneously every 7 days.  - rosuvastatin (CRESTOR) 40 MG tablet; Take 1 tablet (40 mg) by mouth daily.  - Lipid panel reflex to direct LDL Fasting; Future  - Hemoglobin A1c; Future  - Comprehensive metabolic panel (BMP + Alb, Alk Phos, ALT, AST, Total. Bili, TP); Future    Coronary artery disease involving native coronary artery of native heart, unspecified whether angina present  Mentioned above   - semaglutide (OZEMPIC) 2 MG/3ML pen; Inject 0.25 mg subcutaneously every 7 days.  - rosuvastatin (CRESTOR) 40 MG tablet; Take 1 tablet (40 mg) by mouth daily.  - Lipid panel reflex to direct LDL Fasting; Future  - Hemoglobin A1c; Future  - Comprehensive metabolic panel (BMP + Alb, Alk Phos, ALT, AST, Total. Bili, TP); Future            FUTURE APPOINTMENTS:       - Follow-up lab in 3 months     Luisa Fitzgerald is a 59 year old, presenting for the following health issues:  Recheck Medication    History of Present Illness       Diabetes:   He presents for follow up of diabetes.  He is checking home blood glucose a few times a week.   He checks blood glucose before meals.  Blood glucose is sometimes over 200 and never under 70.  When his blood glucose is low, the patient is asymptomatic for confusion, blurred vision, lethargy and reports not feeling dizzy, shaky, or weak.  He is concerned about blood sugar frequently over 200.    He is not experiencing numbness or burning in feet, excessive thirst, blurry vision, weight changes or redness, sores or blisters on feet. The patient has had a diabetic eye exam in the last 12 months. Eye exam performed on 12/24. Location of last eye exam         Hyperlipidemia:  He presents for follow up of hyperlipidemia.   He is taking medication to lower cholesterol. He is not having myalgia or other side effects to statin medications.    Hypertension: He presents for follow up of hypertension.  He does check blood pressure   regularly outside of the clinic. Outpatient blood pressures have not been over 140/90. He follows a low salt diet.          Diabetes Follow-up    How often are you checking your blood sugar? One time daily  What time of day are you checking your blood sugars (select all that apply)?  Before meals  Have you had any blood sugars above 200?  Yes   Have you had any blood sugars below 70?  No  What symptoms do you notice when your blood sugar is low?  None  What concerns do you have today about your diabetes? None   Do you have any of these symptoms? (Select all that apply)  No numbness or tingling in feet.  No redness, sores or blisters on feet.  No complaints of excessive thirst.  No reports of blurry vision.  No significant changes to weight.  Have you had a diabetic eye exam in the last 12 months? No            Hyperlipidemia Follow-Up    Are you regularly taking any medication or supplement to lower your cholesterol?   Yes- statin  Are you having muscle aches or other side effects that you think could be caused by your cholesterol lowering medication?  No    Hypertension Follow-up    Do you check your blood pressure regularly outside of the clinic? Yes   Are you following a low salt diet? Yes  Are your blood pressures ever more than 140 on the top number (systolic) OR more   than 90 on the bottom number (diastolic), for example 140/90? Yes    BP Readings from Last 2 Encounters:   12/09/24 123/85   09/08/23 131/78     Hemoglobin A1C (%)   Date Value   12/02/2024 11.4 (H)   09/11/2023 7.1 (H)   07/17/2015 5.8   12/11/2013 5.6     LDL Cholesterol Calculated (mg/dL)   Date Value   12/02/2024 111 (H)   09/05/2023 107 (H)   06/25/2021 105 (H)   06/11/2020 89         Vascular Disease Follow-up    How often do you take nitroglycerin? Never  Do you take an aspirin every day? Yes      Review of Systems  Constitutional, HEENT, cardiovascular, pulmonary, GI, , musculoskeletal, neuro, skin, endocrine and psych systems are  negative, except as otherwise noted.      Objective           Vitals:  No vitals were obtained today due to virtual visit.    Physical Exam   GENERAL: alert and no distress  EYES: Eyes grossly normal to inspection.  No discharge or erythema, or obvious scleral/conjunctival abnormalities.  RESP: No audible wheeze, cough, or visible cyanosis.    SKIN: Visible skin clear. No significant rash, abnormal pigmentation or lesions.  NEURO: Cranial nerves grossly intact.  Mentation and speech appropriate for age.  PSYCH: Appropriate affect, tone, and pace of words    The longitudinal plan of care for the diagnosis(es)/condition(s) as documented were addressed during this visit. Due to the added complexity in care, I will continue to support Amilcar in the subsequent management and with ongoing continuity of care.        Video-Visit Details    Type of service:  Video Visit   Originating Location (pt. Location): Home    Distant Location (provider location):  On-site  Platform used for Video Visit: [employer]  Signed Electronically by: Chris Eason MD

## 2025-03-07 NOTE — TELEPHONE ENCOUNTER
Upon review, the below diagnoses were on the Ozempic script.         Dr. Eason: would advise starting a PA for this medication?    Thank you,  Dione Kulkarni RN

## 2025-03-10 ENCOUNTER — TELEPHONE (OUTPATIENT)
Dept: FAMILY MEDICINE | Facility: CLINIC | Age: 60
End: 2025-03-10
Payer: COMMERCIAL

## 2025-03-10 NOTE — TELEPHONE ENCOUNTER
Prior Authorization Specialty Medication Request    Medication/Dose: semaglutide (OZEMPIC) 2 MG/3ML pen   Diagnosis and ICD code (if different than what is on RX):    Hyperlipidemia LDL goal <160 [E78.5]  - Primary      Benign essential hypertension [I10]      Type 2 diabetes mellitus with other circulatory complication, unspecified whether long term insulin use (H) [E11.59]      Stenosis of left anterior descending (LAD) artery [I25.10]      Coronary artery disease involving native coronary artery of native heart, unspecified whether angina present [I25.10]        New/renewal/insurance change PA/secondary ins. PA:  Previously Tried and Failed:      Important Lab Values:   Rationale:     Insurance   Primary: [employer] [place]   Insurance ID:  [id number]     Secondary (if applicable):  Insurance ID:      Pharmacy Information (if different than what is on RX)  Name:  [id number] [place], [place] - [address]   Phone:  [phone]   Fax:    [phone]       Clinic Information  Preferred routing pool for dept communication: [facility] Nurse pool    Linda Gonsales RN  [facility]

## 2025-03-10 NOTE — TELEPHONE ENCOUNTER
Please see telephone encounter dated 03/10/2025 for start of PA.    Alyssia NGUYEN,  Triage RN  [facility]

## 2025-03-11 ENCOUNTER — MYC REFILL (OUTPATIENT)
Dept: INTERNAL MEDICINE | Facility: CLINIC | Age: 60
End: 2025-03-11
Payer: COMMERCIAL

## 2025-03-11 DIAGNOSIS — E11.65 UNCONTROLLED TYPE 2 DIABETES MELLITUS WITH HYPERGLYCEMIA (H): ICD-10-CM

## 2025-03-11 DIAGNOSIS — E11.9 NEW ONSET TYPE 2 DIABETES MELLITUS (H): ICD-10-CM

## 2025-03-12 RX ORDER — ALBUTEROL SULFATE 108 UG/1
AEROSOL, METERED RESPIRATORY (INHALATION)
Qty: 100 EACH | Refills: 3 | Status: SHIPPED | OUTPATIENT
Start: 2025-03-12

## 2025-03-12 NOTE — TELEPHONE ENCOUNTER
Prior Authorization Not Needed per Insurance    Medication: OZEMPIC (0.25 OR 0.5 MG/DOSE) 2 MG/1.5ML SC SOPN  Insurance Company: [employer] - Phone [phone] Fax [phone]  Expected CoPay: $    Pharmacy Filling the Rx: [id number] - [place], [place] - [address] [address]  Pharmacy Notified: YES  Patient Notified: Instructed pharmacy to notify patient when script is ready to pick-up/ship

## 2025-03-22 ENCOUNTER — HEALTH MAINTENANCE LETTER (OUTPATIENT)
Age: 60
End: 2025-03-22

## 2025-06-15 DIAGNOSIS — I25.10 STENOSIS OF LEFT ANTERIOR DESCENDING (LAD) ARTERY: ICD-10-CM

## 2025-06-15 DIAGNOSIS — E11.59 TYPE 2 DIABETES MELLITUS WITH OTHER CIRCULATORY COMPLICATION, UNSPECIFIED WHETHER LONG TERM INSULIN USE (H): ICD-10-CM

## 2025-06-15 DIAGNOSIS — I10 BENIGN ESSENTIAL HYPERTENSION: ICD-10-CM

## 2025-06-15 DIAGNOSIS — I25.10 CORONARY ARTERY DISEASE INVOLVING NATIVE CORONARY ARTERY OF NATIVE HEART, UNSPECIFIED WHETHER ANGINA PRESENT: ICD-10-CM

## 2025-06-15 DIAGNOSIS — E78.5 HYPERLIPIDEMIA LDL GOAL <160: ICD-10-CM

## 2025-06-16 RX ORDER — SEMAGLUTIDE 0.68 MG/ML
INJECTION, SOLUTION SUBCUTANEOUS
Qty: 3 ML | Refills: 0 | Status: SHIPPED | OUTPATIENT
Start: 2025-06-16

## 2025-06-17 ENCOUNTER — MYC REFILL (OUTPATIENT)
Dept: FAMILY MEDICINE | Facility: CLINIC | Age: 60
End: 2025-06-17
Payer: COMMERCIAL

## 2025-06-17 DIAGNOSIS — I10 BENIGN ESSENTIAL HYPERTENSION: ICD-10-CM

## 2025-06-17 DIAGNOSIS — E11.59 TYPE 2 DIABETES MELLITUS WITH OTHER CIRCULATORY COMPLICATION, UNSPECIFIED WHETHER LONG TERM INSULIN USE (H): ICD-10-CM

## 2025-06-17 DIAGNOSIS — I25.10 STENOSIS OF LEFT ANTERIOR DESCENDING (LAD) ARTERY: ICD-10-CM

## 2025-06-17 DIAGNOSIS — I25.10 CORONARY ARTERY DISEASE INVOLVING NATIVE CORONARY ARTERY OF NATIVE HEART, UNSPECIFIED WHETHER ANGINA PRESENT: ICD-10-CM

## 2025-06-17 DIAGNOSIS — E78.5 HYPERLIPIDEMIA LDL GOAL <160: ICD-10-CM

## 2025-06-17 RX ORDER — SEMAGLUTIDE 0.68 MG/ML
INJECTION, SOLUTION SUBCUTANEOUS
Qty: 3 ML | Refills: 0 | OUTPATIENT
Start: 2025-06-17

## 2025-06-19 ENCOUNTER — LAB (OUTPATIENT)
Dept: LAB | Facility: CLINIC | Age: 60
End: 2025-06-19
Payer: COMMERCIAL

## 2025-06-19 ENCOUNTER — RESULTS FOLLOW-UP (OUTPATIENT)
Dept: FAMILY MEDICINE | Facility: CLINIC | Age: 60
End: 2025-06-19

## 2025-06-19 DIAGNOSIS — I10 BENIGN ESSENTIAL HYPERTENSION: ICD-10-CM

## 2025-06-19 DIAGNOSIS — E78.5 HYPERLIPIDEMIA LDL GOAL <160: ICD-10-CM

## 2025-06-19 DIAGNOSIS — E11.59 TYPE 2 DIABETES MELLITUS WITH OTHER CIRCULATORY COMPLICATION, UNSPECIFIED WHETHER LONG TERM INSULIN USE (H): ICD-10-CM

## 2025-06-19 DIAGNOSIS — I25.10 CORONARY ARTERY DISEASE INVOLVING NATIVE CORONARY ARTERY OF NATIVE HEART, UNSPECIFIED WHETHER ANGINA PRESENT: ICD-10-CM

## 2025-06-19 DIAGNOSIS — E11.59 TYPE 2 DIABETES MELLITUS WITH OTHER CIRCULATORY COMPLICATION, UNSPECIFIED WHETHER LONG TERM INSULIN USE (H): Primary | ICD-10-CM

## 2025-06-19 DIAGNOSIS — I25.10 STENOSIS OF LEFT ANTERIOR DESCENDING (LAD) ARTERY: ICD-10-CM

## 2025-06-19 DIAGNOSIS — E11.65 UNCONTROLLED TYPE 2 DIABETES MELLITUS WITH HYPERGLYCEMIA (H): ICD-10-CM

## 2025-06-19 LAB
ALBUMIN SERPL BCG-MCNC: 4.2 G/DL (ref 3.5–5.2)
ALP SERPL-CCNC: 108 U/L (ref 40–150)
ALT SERPL W P-5'-P-CCNC: 55 U/L (ref 0–70)
ANION GAP SERPL CALCULATED.3IONS-SCNC: 9 MMOL/L (ref 7–15)
AST SERPL W P-5'-P-CCNC: 34 U/L (ref 0–45)
BILIRUB SERPL-MCNC: 0.3 MG/DL
BUN SERPL-MCNC: 11 MG/DL (ref 6–20)
CALCIUM SERPL-MCNC: 9.7 MG/DL (ref 8.8–10.4)
CHLORIDE SERPL-SCNC: 101 MMOL/L (ref 98–107)
CHOLEST SERPL-MCNC: 121 MG/DL
CREAT SERPL-MCNC: 0.88 MG/DL (ref 0.67–1.17)
EGFRCR SERPLBLD CKD-EPI 2021: >90 ML/MIN/1.73M2
EST. AVERAGE GLUCOSE BLD GHB EST-MCNC: 209 MG/DL
FASTING STATUS PATIENT QL REPORTED: YES
FASTING STATUS PATIENT QL REPORTED: YES
GLUCOSE SERPL-MCNC: 165 MG/DL (ref 70–99)
HBA1C MFR BLD: 8.9 % (ref 0–5.6)
HCO3 SERPL-SCNC: 28 MMOL/L (ref 22–29)
HDLC SERPL-MCNC: 39 MG/DL
LDLC SERPL CALC-MCNC: 50 MG/DL
NONHDLC SERPL-MCNC: 82 MG/DL
POTASSIUM SERPL-SCNC: 4.3 MMOL/L (ref 3.4–5.3)
PROT SERPL-MCNC: 6.9 G/DL (ref 6.4–8.3)
SODIUM SERPL-SCNC: 138 MMOL/L (ref 135–145)
TRIGL SERPL-MCNC: 160 MG/DL